# Patient Record
Sex: MALE | Race: WHITE | NOT HISPANIC OR LATINO | Employment: OTHER | URBAN - METROPOLITAN AREA
[De-identification: names, ages, dates, MRNs, and addresses within clinical notes are randomized per-mention and may not be internally consistent; named-entity substitution may affect disease eponyms.]

---

## 2017-12-04 ENCOUNTER — OFFICE VISIT (OUTPATIENT)
Dept: INTERNAL MEDICINE | Facility: CLINIC | Age: 62
End: 2017-12-04

## 2017-12-04 VITALS
SYSTOLIC BLOOD PRESSURE: 110 MMHG | HEART RATE: 105 BPM | DIASTOLIC BLOOD PRESSURE: 82 MMHG | TEMPERATURE: 97.6 F | BODY MASS INDEX: 26.14 KG/M2 | WEIGHT: 193 LBS | HEIGHT: 72 IN | OXYGEN SATURATION: 99 %

## 2017-12-04 DIAGNOSIS — N64.4 BREAST TENDERNESS IN MALE: Primary | ICD-10-CM

## 2017-12-04 PROCEDURE — 99203 OFFICE O/P NEW LOW 30 MIN: CPT | Performed by: FAMILY MEDICINE

## 2017-12-04 RX ORDER — TAVABOROLE 43.5 MG/ML
1 SOLUTION TOPICAL DAILY
COMMUNITY
Start: 2017-11-30 | End: 2019-04-16

## 2017-12-04 RX ORDER — SILDENAFIL CITRATE 100 MG
100 TABLET ORAL DAILY PRN
COMMUNITY
Start: 2017-11-04 | End: 2020-05-06

## 2017-12-04 RX ORDER — CLOMIPRAMINE HYDROCHLORIDE 50 MG/1
150 CAPSULE ORAL NIGHTLY
COMMUNITY
Start: 2017-11-15 | End: 2021-01-26 | Stop reason: SDUPTHER

## 2017-12-04 RX ORDER — OXICONAZOLE NITRATE 10 MG/ML
LOTION TOPICAL
COMMUNITY
Start: 2017-08-28 | End: 2017-12-04

## 2017-12-04 NOTE — PROGRESS NOTES
Subjective   Leonardo Youssef is a 62 y.o. male.     Chief Complaint   Patient presents with   • New Patient Visit   • left breast pain     for about 1 year         History of Present Illness     Patient presents today with left breast pain.  He notes that his been going there for one year.  He notes that it's painful.  He notes that he didn't follow up or get evaluated due to him being scared.  Patient has a significant family history of cancer including his father dying from liver cancer.  Mother had breast cancer.  He has a past medical history of prostate cancer.  He notes that it is tender around the nipple on palpation.  He notes that he can feel a small lump.  Patient denies any rash or abscess.  Patient denies any trauma.  Patient notes that nothing seems to make it better.    The following portions of the patient's history were reviewed and updated as appropriate: allergies, current medications, past family history, past medical history, past social history, past surgical history and problem list.    Review of Systems   Constitutional: Negative for chills and fever.   HENT: Negative for congestion, rhinorrhea, sinus pain and sore throat.    Eyes: Negative for photophobia and visual disturbance.   Respiratory: Negative for cough, chest tightness and shortness of breath.    Cardiovascular: Negative for chest pain and palpitations.   Gastrointestinal: Negative for diarrhea, nausea and vomiting.   Genitourinary: Negative for dysuria, frequency and urgency.   Skin: Negative for rash and wound.   Neurological: Negative for dizziness and syncope.   Psychiatric/Behavioral: Negative for behavioral problems and confusion.       Objective   Physical Exam   Constitutional: He is oriented to person, place, and time. He appears well-developed and well-nourished.   HENT:   Head: Normocephalic and atraumatic.   Right Ear: External ear normal.   Left Ear: External ear normal.   Mouth/Throat: Oropharynx is clear and moist.    Eyes: EOM are normal.   Neck: Normal range of motion. Neck supple.   Cardiovascular: Normal rate, regular rhythm and normal heart sounds.    Pulmonary/Chest: Effort normal and breath sounds normal. No respiratory distress. Right breast exhibits no inverted nipple. Left breast exhibits mass and tenderness. Left breast exhibits no inverted nipple.       Musculoskeletal: Normal range of motion.   Lymphadenopathy:     He has no cervical adenopathy.   Neurological: He is alert and oriented to person, place, and time.   Skin: Skin is warm.   Psychiatric: He has a normal mood and affect. His behavior is normal.   Nursing note and vitals reviewed.      Assessment/Plan   Leonardo was seen today for new patient visit and left breast pain.    Diagnoses and all orders for this visit:    Breast tenderness in male  -     US breast left complete; Future  -     We'll obtain ultrasound.  We'll evaluate results.  If positive will send patient to general surgeon for further evaluation.          No Follow-up on file.    Much of this encounter note is an electronic transcription/translation of spoken language to printed text. The electronic translation of spoken language may permit erroneous, or at times, nonsensical words or phrases to be inadvertently transcribed; although I have reviewed the note for such errors, some may still exist.

## 2017-12-05 DIAGNOSIS — N64.4 PAIN OF LEFT BREAST: Primary | ICD-10-CM

## 2017-12-05 DIAGNOSIS — N64.4 BREAST PAIN, LEFT: Primary | ICD-10-CM

## 2017-12-05 DIAGNOSIS — R51.9 TENDERNESS OF TEMPLE REGION: ICD-10-CM

## 2017-12-12 ENCOUNTER — HOSPITAL ENCOUNTER (OUTPATIENT)
Dept: ULTRASOUND IMAGING | Facility: HOSPITAL | Age: 62
Discharge: HOME | End: 2017-12-12
Admitting: FAMILY MEDICINE

## 2017-12-12 ENCOUNTER — HOSPITAL ENCOUNTER (OUTPATIENT)
Dept: MAMMOGRAPHY | Facility: HOSPITAL | Age: 62
Discharge: HOME | End: 2017-12-12

## 2017-12-12 DIAGNOSIS — N64.4 BREAST PAIN, LEFT: ICD-10-CM

## 2017-12-12 DIAGNOSIS — N64.4 BREAST TENDERNESS IN MALE: ICD-10-CM

## 2017-12-12 PROCEDURE — G0204 DX MAMMO INCL CAD BI: HCPCS

## 2017-12-12 PROCEDURE — 76642 ULTRASOUND BREAST LIMITED: CPT

## 2017-12-14 ENCOUNTER — TELEPHONE (OUTPATIENT)
Dept: INTERNAL MEDICINE | Facility: CLINIC | Age: 62
End: 2017-12-14

## 2017-12-14 NOTE — TELEPHONE ENCOUNTER
----- Message from Stan Thompson MD sent at 12/14/2017 11:04 AM EST -----  The labs were reviewed. Please inform patient that labs were normal.

## 2017-12-21 ENCOUNTER — OFFICE VISIT (OUTPATIENT)
Dept: INTERNAL MEDICINE | Facility: CLINIC | Age: 62
End: 2017-12-21

## 2017-12-21 VITALS
DIASTOLIC BLOOD PRESSURE: 84 MMHG | BODY MASS INDEX: 25.58 KG/M2 | HEART RATE: 91 BPM | WEIGHT: 188.9 LBS | OXYGEN SATURATION: 97 % | HEIGHT: 72 IN | SYSTOLIC BLOOD PRESSURE: 118 MMHG

## 2017-12-21 DIAGNOSIS — Z12.11 ENCOUNTER FOR SCREENING COLONOSCOPY: ICD-10-CM

## 2017-12-21 DIAGNOSIS — Z00.00 VISIT FOR WELL MAN HEALTH CHECK: Primary | ICD-10-CM

## 2017-12-21 DIAGNOSIS — Z13.220 SCREENING FOR LIPID DISORDERS: ICD-10-CM

## 2017-12-21 DIAGNOSIS — Z13.29 SCREENING FOR THYROID DISORDER: ICD-10-CM

## 2017-12-21 DIAGNOSIS — Z13.21 ENCOUNTER FOR VITAMIN DEFICIENCY SCREENING: ICD-10-CM

## 2017-12-21 DIAGNOSIS — Z11.59 ENCOUNTER FOR HEPATITIS C SCREENING TEST FOR LOW RISK PATIENT: ICD-10-CM

## 2017-12-21 DIAGNOSIS — Z13.1 SCREENING FOR DIABETES MELLITUS: ICD-10-CM

## 2017-12-21 PROCEDURE — 99396 PREV VISIT EST AGE 40-64: CPT | Performed by: FAMILY MEDICINE

## 2017-12-21 NOTE — PROGRESS NOTES
Subjective   Leonardo Youssef is a 62 y.o. male and is here for a comprehensive physical exam. The patient reports no problems.        Do you take any herbs or supplements that were not prescribed by a doctor? no      Social History:   Social History     Social History   • Marital status:      Spouse name: N/A   • Number of children: 2   • Years of education: N/A     Occupational History   • retired teacher      Social History Main Topics   • Smoking status: Never Smoker   • Smokeless tobacco: Never Used   • Alcohol use Yes      Comment: 1 per week   • Drug use: Not on file   • Sexual activity: Not on file     Other Topics Concern   • Not on file     Social History Narrative       Family History:   Family History   Problem Relation Age of Onset   • Glaucoma Mother    • Alzheimer's disease Mother    • Hypertension Father    • Diabetes Father    • Liver cancer Father    • Nephrolithiasis Father    • Multiple myeloma Sister    • Other Brother      liver transplant       Past Medical History:   Past Medical History:   Diagnosis Date   • Hx of radiation therapy    • Prostate cancer            Review of Systems    Review of Systems    Objective   Physical Exam    Medications:   Current Outpatient Prescriptions:   •  clomiPRAMINE (ANAFRANIL) 50 MG capsule, Take 100 mg by mouth Every Night., Disp: , Rfl:   •  KERYDIN 5 % solution, Apply 1 application topically Daily., Disp: , Rfl:   •  VIAGRA 100 MG tablet, Take 100 mg by mouth Daily As Needed., Disp: , Rfl:        Assessment/Plan   Healthy male exam.     1. Healthcare Maintenance:  2. Patient Counseling:  --Nutrition: Stressed importance of moderation in sodium/caffeine intake, saturated fat and cholesterol, caloric balance, sufficient intake of fresh fruits, vegetables, fiber, calcium and vit D  --Exercise: Recommended 30 minutes of exercise daily.  --Immunizations reviewed. Patient declined immunizations at today's office visit.  --Discussed benefits of screening  colonoscopy.  Referral placed.    Diagnoses and all orders for this visit:    Visit for well man health check  -     Comprehensive Metabolic Panel  -     CBC & Differential    Screening for lipid disorders  -     Lipid Panel With LDL / HDL Ratio    Screening for diabetes mellitus  -     Hemoglobin A1c    Screening for thyroid disorder  -     Thyroid Panel With TSH    Encounter for screening colonoscopy  -     Ambulatory Referral For Screening Colonoscopy    Encounter for vitamin deficiency screening  -     Vitamin D 25 Hydroxy    Encounter for hepatitis C screening test for low risk patient  -     Hepatitis C Antibody        No Follow-up on file.

## 2017-12-22 ENCOUNTER — TELEPHONE (OUTPATIENT)
Dept: INTERNAL MEDICINE | Facility: CLINIC | Age: 62
End: 2017-12-22

## 2017-12-22 LAB
25(OH)D3+25(OH)D2 SERPL-MCNC: 28 NG/ML (ref 30–100)
ALBUMIN SERPL-MCNC: 4.3 G/DL (ref 3.5–5.2)
ALBUMIN/GLOB SERPL: 1.5 G/DL
ALP SERPL-CCNC: 56 U/L (ref 39–117)
ALT SERPL-CCNC: 19 U/L (ref 1–41)
AST SERPL-CCNC: 20 U/L (ref 1–40)
BASOPHILS # BLD AUTO: 0.05 10*3/MM3 (ref 0–0.2)
BASOPHILS NFR BLD AUTO: 0.9 % (ref 0–1.5)
BILIRUB SERPL-MCNC: 0.5 MG/DL (ref 0.1–1.2)
BUN SERPL-MCNC: 21 MG/DL (ref 8–23)
BUN/CREAT SERPL: 17.8 (ref 7–25)
CALCIUM SERPL-MCNC: 9.7 MG/DL (ref 8.6–10.5)
CHLORIDE SERPL-SCNC: 100 MMOL/L (ref 98–107)
CHOLEST SERPL-MCNC: 194 MG/DL (ref 0–200)
CO2 SERPL-SCNC: 29.1 MMOL/L (ref 22–29)
CREAT SERPL-MCNC: 1.18 MG/DL (ref 0.76–1.27)
EOSINOPHIL # BLD AUTO: 0.21 10*3/MM3 (ref 0–0.7)
EOSINOPHIL NFR BLD AUTO: 3.9 % (ref 0.3–6.2)
ERYTHROCYTE [DISTWIDTH] IN BLOOD BY AUTOMATED COUNT: 14.2 % (ref 11.5–14.5)
FT4I SERPL CALC-MCNC: 1.8 (ref 1.2–4.9)
GLOBULIN SER CALC-MCNC: 2.9 GM/DL
GLUCOSE SERPL-MCNC: 103 MG/DL (ref 65–99)
HBA1C MFR BLD: 5.49 % (ref 4.8–5.6)
HCT VFR BLD AUTO: 48.2 % (ref 40.4–52.2)
HCV AB S/CO SERPL IA: <0.1 S/CO RATIO (ref 0–0.9)
HDLC SERPL-MCNC: 43 MG/DL (ref 40–60)
HGB BLD-MCNC: 16.3 G/DL (ref 13.7–17.6)
IMM GRANULOCYTES # BLD: 0 10*3/MM3 (ref 0–0.03)
IMM GRANULOCYTES NFR BLD: 0 % (ref 0–0.5)
LDLC SERPL CALC-MCNC: 117 MG/DL (ref 0–100)
LDLC/HDLC SERPL: 2.72 {RATIO}
LYMPHOCYTES # BLD AUTO: 1.32 10*3/MM3 (ref 0.9–4.8)
LYMPHOCYTES NFR BLD AUTO: 24.2 % (ref 19.6–45.3)
MCH RBC QN AUTO: 28.6 PG (ref 27–32.7)
MCHC RBC AUTO-ENTMCNC: 33.8 G/DL (ref 32.6–36.4)
MCV RBC AUTO: 84.7 FL (ref 79.8–96.2)
MONOCYTES # BLD AUTO: 0.49 10*3/MM3 (ref 0.2–1.2)
MONOCYTES NFR BLD AUTO: 9 % (ref 5–12)
NEUTROPHILS # BLD AUTO: 3.38 10*3/MM3 (ref 1.9–8.1)
NEUTROPHILS NFR BLD AUTO: 62 % (ref 42.7–76)
PLATELET # BLD AUTO: 263 10*3/MM3 (ref 140–500)
POTASSIUM SERPL-SCNC: 4.9 MMOL/L (ref 3.5–5.2)
PROT SERPL-MCNC: 7.2 G/DL (ref 6–8.5)
RBC # BLD AUTO: 5.69 10*6/MM3 (ref 4.6–6)
SODIUM SERPL-SCNC: 140 MMOL/L (ref 136–145)
T3RU NFR SERPL: 26 % (ref 24–39)
T4 SERPL-MCNC: 7.1 UG/DL (ref 4.5–12)
TRIGL SERPL-MCNC: 171 MG/DL (ref 0–150)
TSH SERPL DL<=0.005 MIU/L-ACNC: 3.36 UIU/ML (ref 0.45–4.5)
VLDLC SERPL CALC-MCNC: 34.2 MG/DL (ref 5–40)
WBC # BLD AUTO: 5.45 10*3/MM3 (ref 4.5–10.7)

## 2017-12-22 RX ORDER — ACETAMINOPHEN 160 MG
2000 TABLET,DISINTEGRATING ORAL DAILY
Qty: 90 CAPSULE | Refills: 3 | Status: SHIPPED | OUTPATIENT
Start: 2017-12-22 | End: 2018-12-22

## 2017-12-22 NOTE — TELEPHONE ENCOUNTER
----- Message from Stan Thompson MD sent at 12/22/2017 12:04 PM EST -----  Please inform the patient of the following abnormal results.  The patient needs to start 2000 international units of vitamin D.  Thyroid panel is within normal limits.  Normal hemoglobin A1c.  Hepatitis C antibodies negative.  CBC and CMP are within normal limits.  Lipid panel shows elevated triglycerides.  It also shows elevated LDL.  This can be controlled with diet and exercise.

## 2017-12-22 NOTE — PROGRESS NOTES
Please inform the patient of the following abnormal results.  The patient needs to start 2000 international units of vitamin D.  Thyroid panel is within normal limits.  Normal hemoglobin A1c.  Hepatitis C antibodies negative.  CBC and CMP are within normal limits.  Lipid panel shows elevated triglycerides.  It also shows elevated LDL.  This can be controlled with diet and exercise.

## 2019-04-16 ENCOUNTER — RESULTS ENCOUNTER (OUTPATIENT)
Dept: FAMILY MEDICINE CLINIC | Facility: CLINIC | Age: 64
End: 2019-04-16

## 2019-04-16 ENCOUNTER — OFFICE VISIT (OUTPATIENT)
Dept: FAMILY MEDICINE CLINIC | Facility: CLINIC | Age: 64
End: 2019-04-16

## 2019-04-16 VITALS
WEIGHT: 189.4 LBS | DIASTOLIC BLOOD PRESSURE: 84 MMHG | RESPIRATION RATE: 15 BRPM | OXYGEN SATURATION: 98 % | SYSTOLIC BLOOD PRESSURE: 142 MMHG | BODY MASS INDEX: 25.69 KG/M2 | TEMPERATURE: 97.5 F | HEART RATE: 80 BPM

## 2019-04-16 DIAGNOSIS — E55.9 VITAMIN D DEFICIENCY: ICD-10-CM

## 2019-04-16 DIAGNOSIS — Z79.899 MEDICATION MANAGEMENT: ICD-10-CM

## 2019-04-16 DIAGNOSIS — Z13.29 SCREENING FOR THYROID DISORDER: ICD-10-CM

## 2019-04-16 DIAGNOSIS — Z12.11 SCREENING FOR COLON CANCER: ICD-10-CM

## 2019-04-16 DIAGNOSIS — Z80.7 FAMILY HISTORY OF MULTIPLE MYELOMA: ICD-10-CM

## 2019-04-16 DIAGNOSIS — E78.5 HYPERLIPIDEMIA, UNSPECIFIED HYPERLIPIDEMIA TYPE: Primary | ICD-10-CM

## 2019-04-16 PROBLEM — F41.9 ANXIETY AND DEPRESSION: Status: ACTIVE | Noted: 2019-04-16

## 2019-04-16 PROBLEM — F42.9 OCD (OBSESSIVE COMPULSIVE DISORDER): Status: ACTIVE | Noted: 2019-04-16

## 2019-04-16 PROBLEM — F32.A ANXIETY AND DEPRESSION: Status: ACTIVE | Noted: 2019-04-16

## 2019-04-16 PROCEDURE — 99214 OFFICE O/P EST MOD 30 MIN: CPT | Performed by: NURSE PRACTITIONER

## 2019-04-16 NOTE — PROGRESS NOTES
Subjective   Leonardo Youssef is a 64 y.o. male presents to establish care. He is concerned for multiple myeloma, states he has a sister that was diagnosed 8 years ago. His brother was recently diagnosed. He is interested in lab tests to evaluate. Denies any chronic pain or injuries. Denies fatigue.     Hx of OCD, taking clomipramine and trintellix, managed by Psychiatry. States he was evaluated yesterday and reports starting trintellix today without side effects.     Prostate Ca, Dr. Reyes, six month check ups, tx with radiation, denies any urinary symptoms.     He has not had a screening colonoscopy. Agreeable to cologuard. Understands that if positive will need to proceed with colonoscopy.   He received his first pneumonia vaccine last year, around this time at [facility], states he will receive his second one soon. Has not received his shingles vaccine but will look into that at his local pharmacy.    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 He is  with two grown children. He has several grandchildren.       History of Present Illness     The following portions of the patient's history were reviewed and updated as appropriate: allergies, current medications, past family history, past medical history, past social history, past surgical history and problem list.    Review of Systems   Constitutional: Negative.    HENT: Negative.    Eyes: Negative.    Respiratory: Negative.    Cardiovascular: Negative.    Gastrointestinal: Negative.    Endocrine: Negative.    Genitourinary: Negative.    Musculoskeletal: Negative.    Skin: Negative.    Allergic/Immunologic: Negative.    Neurological: Negative.    Hematological: Negative.    Psychiatric/Behavioral: Negative.        Objective   Physical Exam   Constitutional: He is oriented to person, place, and time. He appears well-developed and well-nourished. No distress.   HENT:   Head: Normocephalic and atraumatic.   Right Ear:  Tympanic membrane, external ear and ear canal normal.   Left Ear: Tympanic membrane, external ear and ear canal normal.   Nose: Nose normal.   Mouth/Throat: Uvula is midline, oropharynx is clear and moist and mucous membranes are normal. No oropharyngeal exudate.   Neck: Neck supple.   Cardiovascular: Normal rate, regular rhythm and normal heart sounds. Exam reveals no gallop and no friction rub.   No murmur heard.  Pulmonary/Chest: Effort normal and breath sounds normal. No respiratory distress. He has no wheezes. He has no rales.   Abdominal: Soft. Bowel sounds are normal. He exhibits no distension. There is no tenderness.   Neurological: He is alert and oriented to person, place, and time.   Skin: Skin is warm and dry. He is not diaphoretic.   Psychiatric: He has a normal mood and affect.   Vitals reviewed.      Assessment/Plan   Leonardo was seen today for establish care.    Diagnoses and all orders for this visit:    Hyperlipidemia, unspecified hyperlipidemia type  -     Lipid Panel With LDL / HDL Ratio    Screening for colon cancer  -     Cologuard - Stool, Per Rectum; Future    Family history of multiple myeloma  -     CBC & Differential  -     C-reactive protein  -     Sedimentation rate, automated    Vitamin D deficiency  -     Vitamin D 25 hydroxy    Screening for thyroid disorder  -     TSH Rfx On Abnormal To Free T4    Medication management  -     Comprehensive metabolic panel    will proceed with lab testing, discussed for any abnormality, will refer to [facility] group, for further eval with family history of multiple myeloma, patient verbalized understanding  Labs nonfasting, suspect lipids to be increased, have baseline to compare  Follow up as needed or annually

## 2019-04-17 LAB
25(OH)D3+25(OH)D2 SERPL-MCNC: 32.9 NG/ML (ref 30–100)
ALBUMIN SERPL-MCNC: 4.5 G/DL (ref 3.5–5.2)
ALBUMIN/GLOB SERPL: 1.6 G/DL
ALP SERPL-CCNC: 63 U/L (ref 39–117)
ALT SERPL-CCNC: 19 U/L (ref 1–41)
AST SERPL-CCNC: 18 U/L (ref 1–40)
BASOPHILS # BLD AUTO: 0.07 10*3/MM3 (ref 0–0.2)
BASOPHILS NFR BLD AUTO: 1.5 % (ref 0–1.5)
BILIRUB SERPL-MCNC: 0.4 MG/DL (ref 0.2–1.2)
BUN SERPL-MCNC: 16 MG/DL (ref 8–23)
BUN/CREAT SERPL: 14.7 (ref 7–25)
CALCIUM SERPL-MCNC: 10 MG/DL (ref 8.6–10.5)
CHLORIDE SERPL-SCNC: 96 MMOL/L (ref 98–107)
CHOLEST SERPL-MCNC: 206 MG/DL (ref 0–200)
CO2 SERPL-SCNC: 30.1 MMOL/L (ref 22–29)
CREAT SERPL-MCNC: 1.09 MG/DL (ref 0.76–1.27)
CRP SERPL-MCNC: 0.31 MG/DL (ref 0–0.5)
EOSINOPHIL # BLD AUTO: 0.32 10*3/MM3 (ref 0–0.4)
EOSINOPHIL NFR BLD AUTO: 6.7 % (ref 0.3–6.2)
ERYTHROCYTE [DISTWIDTH] IN BLOOD BY AUTOMATED COUNT: 14.5 % (ref 12.3–15.4)
ERYTHROCYTE [SEDIMENTATION RATE] IN BLOOD BY WESTERGREN METHOD: 5 MM/HR (ref 0–20)
GLOBULIN SER CALC-MCNC: 2.9 GM/DL
GLUCOSE SERPL-MCNC: 87 MG/DL (ref 65–99)
HCT VFR BLD AUTO: 46.9 % (ref 37.5–51)
HDLC SERPL-MCNC: 42 MG/DL (ref 40–60)
HGB BLD-MCNC: 15.2 G/DL (ref 13–17.7)
IMM GRANULOCYTES # BLD AUTO: 0.01 10*3/MM3 (ref 0–0.05)
IMM GRANULOCYTES NFR BLD AUTO: 0.2 % (ref 0–0.5)
LDLC SERPL CALC-MCNC: 120 MG/DL (ref 0–100)
LDLC/HDLC SERPL: 2.86 {RATIO}
LYMPHOCYTES # BLD AUTO: 1.27 10*3/MM3 (ref 0.7–3.1)
LYMPHOCYTES NFR BLD AUTO: 26.6 % (ref 19.6–45.3)
MCH RBC QN AUTO: 27.1 PG (ref 26.6–33)
MCHC RBC AUTO-ENTMCNC: 32.4 G/DL (ref 31.5–35.7)
MCV RBC AUTO: 83.8 FL (ref 79–97)
MONOCYTES # BLD AUTO: 0.54 10*3/MM3 (ref 0.1–0.9)
MONOCYTES NFR BLD AUTO: 11.3 % (ref 5–12)
NEUTROPHILS # BLD AUTO: 2.56 10*3/MM3 (ref 1.4–7)
NEUTROPHILS NFR BLD AUTO: 53.7 % (ref 42.7–76)
NRBC BLD AUTO-RTO: 0.2 /100 WBC (ref 0–0)
PLATELET # BLD AUTO: 277 10*3/MM3 (ref 140–450)
POTASSIUM SERPL-SCNC: 4.6 MMOL/L (ref 3.5–5.2)
PROT SERPL-MCNC: 7.4 G/DL (ref 6–8.5)
RBC # BLD AUTO: 5.6 10*6/MM3 (ref 4.14–5.8)
SODIUM SERPL-SCNC: 135 MMOL/L (ref 136–145)
TRIGL SERPL-MCNC: 220 MG/DL (ref 0–150)
TSH SERPL DL<=0.005 MIU/L-ACNC: 3.83 MIU/ML (ref 0.27–4.2)
VLDLC SERPL CALC-MCNC: 44 MG/DL
WBC # BLD AUTO: 4.77 10*3/MM3 (ref 3.4–10.8)

## 2020-04-16 ENCOUNTER — APPOINTMENT (OUTPATIENT)
Dept: GENERAL RADIOLOGY | Facility: HOSPITAL | Age: 65
End: 2020-04-16

## 2020-04-16 ENCOUNTER — APPOINTMENT (OUTPATIENT)
Dept: CT IMAGING | Facility: HOSPITAL | Age: 65
End: 2020-04-16

## 2020-04-16 ENCOUNTER — ANESTHESIA EVENT (OUTPATIENT)
Dept: GASTROENTEROLOGY | Facility: HOSPITAL | Age: 65
End: 2020-04-16

## 2020-04-16 ENCOUNTER — ANESTHESIA (OUTPATIENT)
Dept: GASTROENTEROLOGY | Facility: HOSPITAL | Age: 65
End: 2020-04-16

## 2020-04-16 ENCOUNTER — HOSPITAL ENCOUNTER (INPATIENT)
Facility: HOSPITAL | Age: 65
LOS: 14 days | Discharge: HOME | End: 2020-04-30
Attending: EMERGENCY MEDICINE | Admitting: HOSPITALIST

## 2020-04-16 DIAGNOSIS — R93.5 ABNORMAL CT OF THE ABDOMEN: ICD-10-CM

## 2020-04-16 DIAGNOSIS — R10.13 EPIGASTRIC PAIN: Primary | ICD-10-CM

## 2020-04-16 DIAGNOSIS — K20.90 ESOPHAGITIS: ICD-10-CM

## 2020-04-16 DIAGNOSIS — I63.9 CEREBELLAR INFARCT (HCC): ICD-10-CM

## 2020-04-16 PROBLEM — J96.01 ACUTE RESPIRATORY FAILURE WITH HYPOXIA (HCC): Status: ACTIVE | Noted: 2020-04-16

## 2020-04-16 PROBLEM — N18.9 ACUTE KIDNEY INJURY SUPERIMPOSED ON CHRONIC KIDNEY DISEASE (HCC): Status: ACTIVE | Noted: 2020-04-16

## 2020-04-16 PROBLEM — R50.9 FEVER: Status: ACTIVE | Noted: 2020-04-16

## 2020-04-16 PROBLEM — N17.9 ACUTE KIDNEY INJURY SUPERIMPOSED ON CHRONIC KIDNEY DISEASE: Status: ACTIVE | Noted: 2020-04-16

## 2020-04-16 PROBLEM — J69.0 ASPIRATION PNEUMONIA: Status: ACTIVE | Noted: 2020-04-16

## 2020-04-16 PROBLEM — R13.10 DYSPHAGIA: Status: ACTIVE | Noted: 2020-04-16

## 2020-04-16 PROBLEM — D72.829 LEUKOCYTOSIS: Status: ACTIVE | Noted: 2020-04-16

## 2020-04-16 PROBLEM — I95.9 HYPOTENSION: Status: ACTIVE | Noted: 2020-04-16

## 2020-04-16 LAB
ALBUMIN SERPL-MCNC: 4.2 G/DL (ref 3.5–5.2)
ALBUMIN/GLOB SERPL: 1.2 G/DL
ALP SERPL-CCNC: 60 U/L (ref 39–117)
ALT SERPL W P-5'-P-CCNC: 21 U/L (ref 1–41)
ANION GAP SERPL CALCULATED.3IONS-SCNC: 12.3 MMOL/L (ref 5–15)
ANION GAP SERPL CALCULATED.3IONS-SCNC: 13.3 MMOL/L (ref 5–15)
AST SERPL-CCNC: 25 U/L (ref 1–40)
B PARAPERT DNA SPEC QL NAA+PROBE: NOT DETECTED
B PERT DNA SPEC QL NAA+PROBE: NOT DETECTED
BACTERIA UR QL AUTO: ABNORMAL /HPF
BASOPHILS # BLD AUTO: 0.04 10*3/MM3 (ref 0–0.2)
BASOPHILS NFR BLD AUTO: 0.3 % (ref 0–1.5)
BILIRUB SERPL-MCNC: 0.6 MG/DL (ref 0.2–1.2)
BILIRUB UR QL STRIP: NEGATIVE
BUN BLD-MCNC: 20 MG/DL (ref 8–23)
BUN BLD-MCNC: 23 MG/DL (ref 8–23)
BUN/CREAT SERPL: 17.2 (ref 7–25)
BUN/CREAT SERPL: 18.7 (ref 7–25)
C PNEUM DNA NPH QL NAA+NON-PROBE: NOT DETECTED
CALCIUM SPEC-SCNC: 9.2 MG/DL (ref 8.6–10.5)
CALCIUM SPEC-SCNC: 9.8 MG/DL (ref 8.6–10.5)
CHLORIDE SERPL-SCNC: 95 MMOL/L (ref 98–107)
CHLORIDE SERPL-SCNC: 97 MMOL/L (ref 98–107)
CLARITY UR: CLEAR
CO2 SERPL-SCNC: 24.7 MMOL/L (ref 22–29)
CO2 SERPL-SCNC: 27.7 MMOL/L (ref 22–29)
COLOR UR: YELLOW
CREAT BLD-MCNC: 1.16 MG/DL (ref 0.76–1.27)
CREAT BLD-MCNC: 1.23 MG/DL (ref 0.76–1.27)
D-LACTATE SERPL-SCNC: 2.3 MMOL/L (ref 0.5–2)
D-LACTATE SERPL-SCNC: 2.4 MMOL/L (ref 0.5–2)
DEPRECATED RDW RBC AUTO: 40.4 FL (ref 37–54)
DEPRECATED RDW RBC AUTO: 42.3 FL (ref 37–54)
EOSINOPHIL # BLD AUTO: 0.01 10*3/MM3 (ref 0–0.4)
EOSINOPHIL NFR BLD AUTO: 0.1 % (ref 0.3–6.2)
ERYTHROCYTE [DISTWIDTH] IN BLOOD BY AUTOMATED COUNT: 14.1 % (ref 12.3–15.4)
ERYTHROCYTE [DISTWIDTH] IN BLOOD BY AUTOMATED COUNT: 14.1 % (ref 12.3–15.4)
FLUAV H1 2009 PAND RNA NPH QL NAA+PROBE: NOT DETECTED
FLUAV H1 HA GENE NPH QL NAA+PROBE: NOT DETECTED
FLUAV H3 RNA NPH QL NAA+PROBE: NOT DETECTED
FLUAV SUBTYP SPEC NAA+PROBE: NOT DETECTED
FLUBV RNA ISLT QL NAA+PROBE: NOT DETECTED
GFR SERPL CREATININE-BSD FRML MDRD: 59 ML/MIN/1.73
GFR SERPL CREATININE-BSD FRML MDRD: 63 ML/MIN/1.73
GLOBULIN UR ELPH-MCNC: 3.5 GM/DL
GLUCOSE BLD-MCNC: 119 MG/DL (ref 65–99)
GLUCOSE BLD-MCNC: 139 MG/DL (ref 65–99)
GLUCOSE BLDC GLUCOMTR-MCNC: 73 MG/DL (ref 70–130)
GLUCOSE BLDC GLUCOMTR-MCNC: 78 MG/DL (ref 70–130)
GLUCOSE BLDC GLUCOMTR-MCNC: 84 MG/DL (ref 70–130)
GLUCOSE UR STRIP-MCNC: NEGATIVE MG/DL
HADV DNA SPEC NAA+PROBE: NOT DETECTED
HCOV 229E RNA SPEC QL NAA+PROBE: NOT DETECTED
HCOV HKU1 RNA SPEC QL NAA+PROBE: NOT DETECTED
HCOV NL63 RNA SPEC QL NAA+PROBE: NOT DETECTED
HCOV OC43 RNA SPEC QL NAA+PROBE: NOT DETECTED
HCT VFR BLD AUTO: 42.2 % (ref 37.5–51)
HCT VFR BLD AUTO: 47.2 % (ref 37.5–51)
HGB BLD-MCNC: 14.5 G/DL (ref 13–17.7)
HGB BLD-MCNC: 15.8 G/DL (ref 13–17.7)
HGB UR QL STRIP.AUTO: NEGATIVE
HMPV RNA NPH QL NAA+NON-PROBE: NOT DETECTED
HPIV1 RNA SPEC QL NAA+PROBE: NOT DETECTED
HPIV2 RNA SPEC QL NAA+PROBE: NOT DETECTED
HPIV3 RNA NPH QL NAA+PROBE: NOT DETECTED
HPIV4 P GENE NPH QL NAA+PROBE: NOT DETECTED
HYALINE CASTS UR QL AUTO: ABNORMAL /LPF
IMM GRANULOCYTES # BLD AUTO: 0.05 10*3/MM3 (ref 0–0.05)
IMM GRANULOCYTES NFR BLD AUTO: 0.3 % (ref 0–0.5)
KETONES UR QL STRIP: NEGATIVE
LACTATE HOLD SPECIMEN: NORMAL
LDH SERPL-CCNC: 135 U/L (ref 135–225)
LEUKOCYTE ESTERASE UR QL STRIP.AUTO: ABNORMAL
LIPASE SERPL-CCNC: 24 U/L (ref 13–60)
LYMPHOCYTES # BLD AUTO: 0.35 10*3/MM3 (ref 0.7–3.1)
LYMPHOCYTES NFR BLD AUTO: 2.4 % (ref 19.6–45.3)
M PNEUMO IGG SER IA-ACNC: NOT DETECTED
MCH RBC QN AUTO: 27.9 PG (ref 26.6–33)
MCH RBC QN AUTO: 27.9 PG (ref 26.6–33)
MCHC RBC AUTO-ENTMCNC: 33.5 G/DL (ref 31.5–35.7)
MCHC RBC AUTO-ENTMCNC: 34.4 G/DL (ref 31.5–35.7)
MCV RBC AUTO: 81.2 FL (ref 79–97)
MCV RBC AUTO: 83.4 FL (ref 79–97)
MONOCYTES # BLD AUTO: 0.25 10*3/MM3 (ref 0.1–0.9)
MONOCYTES NFR BLD AUTO: 1.7 % (ref 5–12)
NEUTROPHILS # BLD AUTO: 14.13 10*3/MM3 (ref 1.7–7)
NEUTROPHILS NFR BLD AUTO: 95.2 % (ref 42.7–76)
NITRITE UR QL STRIP: NEGATIVE
NRBC BLD AUTO-RTO: 0 /100 WBC (ref 0–0.2)
PH UR STRIP.AUTO: 6.5 [PH] (ref 5–8)
PLATELET # BLD AUTO: 223 10*3/MM3 (ref 140–450)
PLATELET # BLD AUTO: 257 10*3/MM3 (ref 140–450)
PMV BLD AUTO: 9.2 FL (ref 6–12)
PMV BLD AUTO: 9.6 FL (ref 6–12)
POTASSIUM BLD-SCNC: 4.1 MMOL/L (ref 3.5–5.2)
POTASSIUM BLD-SCNC: 4.8 MMOL/L (ref 3.5–5.2)
PROCALCITONIN SERPL-MCNC: 2.95 NG/ML (ref 0.1–0.25)
PROT SERPL-MCNC: 7.7 G/DL (ref 6–8.5)
PROT UR QL STRIP: NEGATIVE
RBC # BLD AUTO: 5.2 10*6/MM3 (ref 4.14–5.8)
RBC # BLD AUTO: 5.66 10*6/MM3 (ref 4.14–5.8)
RBC # UR: ABNORMAL /HPF
REF LAB TEST METHOD: ABNORMAL
RETICS # AUTO: 0.07 10*6/MM3 (ref 0.02–0.13)
RETICS/RBC NFR AUTO: 1.26 % (ref 0.7–1.9)
RHINOVIRUS RNA SPEC NAA+PROBE: NOT DETECTED
RSV RNA NPH QL NAA+NON-PROBE: NOT DETECTED
SARS-COV-2 RNA RESP QL NAA+PROBE: NOT DETECTED
SODIUM BLD-SCNC: 134 MMOL/L (ref 136–145)
SODIUM BLD-SCNC: 136 MMOL/L (ref 136–145)
SP GR UR STRIP: 1.01 (ref 1–1.03)
SQUAMOUS #/AREA URNS HPF: ABNORMAL /HPF
UROBILINOGEN UR QL STRIP: ABNORMAL
WBC NRBC COR # BLD: 14.83 10*3/MM3 (ref 3.4–10.8)
WBC NRBC COR # BLD: 15.7 10*3/MM3 (ref 3.4–10.8)
WBC UR QL AUTO: ABNORMAL /HPF

## 2020-04-16 PROCEDURE — 25010000002 GLUCAGON (HUMAN RECOMBINANT) 1 MG RECONSTITUTED SOLUTION: Performed by: EMERGENCY MEDICINE

## 2020-04-16 PROCEDURE — 88312 SPECIAL STAINS GROUP 1: CPT | Performed by: INTERNAL MEDICINE

## 2020-04-16 PROCEDURE — 99222 1ST HOSP IP/OBS MODERATE 55: CPT | Performed by: INTERNAL MEDICINE

## 2020-04-16 PROCEDURE — 80048 BASIC METABOLIC PNL TOTAL CA: CPT | Performed by: NURSE PRACTITIONER

## 2020-04-16 PROCEDURE — 25010000002 LORAZEPAM PER 2 MG: Performed by: EMERGENCY MEDICINE

## 2020-04-16 PROCEDURE — 25010000002 ONDANSETRON PER 1 MG: Performed by: EMERGENCY MEDICINE

## 2020-04-16 PROCEDURE — 25010000002 PROPOFOL 10 MG/ML EMULSION: Performed by: ANESTHESIOLOGY

## 2020-04-16 PROCEDURE — 82962 GLUCOSE BLOOD TEST: CPT

## 2020-04-16 PROCEDURE — 74177 CT ABD & PELVIS W/CONTRAST: CPT

## 2020-04-16 PROCEDURE — 80053 COMPREHEN METABOLIC PANEL: CPT | Performed by: EMERGENCY MEDICINE

## 2020-04-16 PROCEDURE — 71045 X-RAY EXAM CHEST 1 VIEW: CPT

## 2020-04-16 PROCEDURE — 25010000002 PHENYLEPHRINE PER 1 ML: Performed by: ANESTHESIOLOGY

## 2020-04-16 PROCEDURE — 88305 TISSUE EXAM BY PATHOLOGIST: CPT | Performed by: INTERNAL MEDICINE

## 2020-04-16 PROCEDURE — 81001 URINALYSIS AUTO W/SCOPE: CPT | Performed by: EMERGENCY MEDICINE

## 2020-04-16 PROCEDURE — 85045 AUTOMATED RETICULOCYTE COUNT: CPT | Performed by: NURSE PRACTITIONER

## 2020-04-16 PROCEDURE — 25010000002 HYDROMORPHONE PER 4 MG: Performed by: HOSPITALIST

## 2020-04-16 PROCEDURE — C1894 INTRO/SHEATH, NON-LASER: HCPCS

## 2020-04-16 PROCEDURE — 99223 1ST HOSP IP/OBS HIGH 75: CPT | Performed by: THORACIC SURGERY (CARDIOTHORACIC VASCULAR SURGERY)

## 2020-04-16 PROCEDURE — 99285 EMERGENCY DEPT VISIT HI MDM: CPT

## 2020-04-16 PROCEDURE — 0100U HC BIOFIRE FILMARRAY RESP PANEL 2: CPT | Performed by: NURSE PRACTITIONER

## 2020-04-16 PROCEDURE — 25010000002 PIPERACILLIN SOD-TAZOBACTAM PER 1 G: Performed by: NURSE PRACTITIONER

## 2020-04-16 PROCEDURE — 87040 BLOOD CULTURE FOR BACTERIA: CPT | Performed by: NURSE PRACTITIONER

## 2020-04-16 PROCEDURE — 83690 ASSAY OF LIPASE: CPT | Performed by: EMERGENCY MEDICINE

## 2020-04-16 PROCEDURE — 84145 PROCALCITONIN (PCT): CPT | Performed by: NURSE PRACTITIONER

## 2020-04-16 PROCEDURE — 87635 SARS-COV-2 COVID-19 AMP PRB: CPT | Performed by: EMERGENCY MEDICINE

## 2020-04-16 PROCEDURE — 85025 COMPLETE CBC W/AUTO DIFF WBC: CPT | Performed by: EMERGENCY MEDICINE

## 2020-04-16 PROCEDURE — 36415 COLL VENOUS BLD VENIPUNCTURE: CPT | Performed by: NURSE PRACTITIONER

## 2020-04-16 PROCEDURE — 25010000002 ONDANSETRON PER 1 MG: Performed by: NURSE PRACTITIONER

## 2020-04-16 PROCEDURE — 25010000002 HYDROMORPHONE PER 4 MG: Performed by: EMERGENCY MEDICINE

## 2020-04-16 PROCEDURE — 25010000002 HYDROMORPHONE PER 4 MG: Performed by: INTERNAL MEDICINE

## 2020-04-16 PROCEDURE — 71250 CT THORAX DX C-: CPT

## 2020-04-16 PROCEDURE — 85027 COMPLETE CBC AUTOMATED: CPT | Performed by: NURSE PRACTITIONER

## 2020-04-16 PROCEDURE — 83615 LACTATE (LD) (LDH) ENZYME: CPT | Performed by: NURSE PRACTITIONER

## 2020-04-16 PROCEDURE — 83605 ASSAY OF LACTIC ACID: CPT | Performed by: NURSE PRACTITIONER

## 2020-04-16 PROCEDURE — 43239 EGD BIOPSY SINGLE/MULTIPLE: CPT | Performed by: INTERNAL MEDICINE

## 2020-04-16 PROCEDURE — 0DB58ZX EXCISION OF ESOPHAGUS, VIA NATURAL OR ARTIFICIAL OPENING ENDOSCOPIC, DIAGNOSTIC: ICD-10-PCS | Performed by: INTERNAL MEDICINE

## 2020-04-16 PROCEDURE — 87040 BLOOD CULTURE FOR BACTERIA: CPT | Performed by: INTERNAL MEDICINE

## 2020-04-16 PROCEDURE — 25010000002 PIPERACILLIN SOD-TAZOBACTAM PER 1 G: Performed by: INTERNAL MEDICINE

## 2020-04-16 PROCEDURE — 25010000002 IOPAMIDOL 61 % SOLUTION: Performed by: EMERGENCY MEDICINE

## 2020-04-16 RX ORDER — PANTOPRAZOLE SODIUM 40 MG/10ML
40 INJECTION, POWDER, LYOPHILIZED, FOR SOLUTION INTRAVENOUS
Status: DISCONTINUED | OUTPATIENT
Start: 2020-04-16 | End: 2020-04-21

## 2020-04-16 RX ORDER — NICOTINE POLACRILEX 4 MG
15 LOZENGE BUCCAL
Status: DISCONTINUED | OUTPATIENT
Start: 2020-04-16 | End: 2020-04-30 | Stop reason: HOSPADM

## 2020-04-16 RX ORDER — SODIUM CHLORIDE 9 MG/ML
150 INJECTION, SOLUTION INTRAVENOUS CONTINUOUS
Status: DISCONTINUED | OUTPATIENT
Start: 2020-04-16 | End: 2020-04-17

## 2020-04-16 RX ORDER — ONDANSETRON 2 MG/ML
4 INJECTION INTRAMUSCULAR; INTRAVENOUS ONCE
Status: COMPLETED | OUTPATIENT
Start: 2020-04-16 | End: 2020-04-16

## 2020-04-16 RX ORDER — LIDOCAINE HYDROCHLORIDE 20 MG/ML
INJECTION, SOLUTION INFILTRATION; PERINEURAL AS NEEDED
Status: DISCONTINUED | OUTPATIENT
Start: 2020-04-16 | End: 2020-04-16 | Stop reason: SURG

## 2020-04-16 RX ORDER — SUCRALFATE ORAL 1 G/10ML
1 SUSPENSION ORAL
Status: DISCONTINUED | OUTPATIENT
Start: 2020-04-16 | End: 2020-04-20

## 2020-04-16 RX ORDER — FAMOTIDINE 10 MG/ML
20 INJECTION, SOLUTION INTRAVENOUS DAILY
Status: DISCONTINUED | OUTPATIENT
Start: 2020-04-16 | End: 2020-04-16

## 2020-04-16 RX ORDER — ONDANSETRON 2 MG/ML
4 INJECTION INTRAMUSCULAR; INTRAVENOUS EVERY 6 HOURS PRN
Status: DISCONTINUED | OUTPATIENT
Start: 2020-04-16 | End: 2020-04-30 | Stop reason: HOSPADM

## 2020-04-16 RX ORDER — SODIUM CHLORIDE 0.9 % (FLUSH) 0.9 %
10 SYRINGE (ML) INJECTION AS NEEDED
Status: DISCONTINUED | OUTPATIENT
Start: 2020-04-16 | End: 2020-04-30 | Stop reason: HOSPADM

## 2020-04-16 RX ORDER — SODIUM CHLORIDE 0.9 % (FLUSH) 0.9 %
20 SYRINGE (ML) INJECTION AS NEEDED
Status: DISCONTINUED | OUTPATIENT
Start: 2020-04-16 | End: 2020-04-30 | Stop reason: HOSPADM

## 2020-04-16 RX ORDER — CLOMIPRAMINE HYDROCHLORIDE 50 MG/1
100 CAPSULE ORAL NIGHTLY
Status: DISCONTINUED | OUTPATIENT
Start: 2020-04-16 | End: 2020-04-18

## 2020-04-16 RX ORDER — SODIUM CHLORIDE 0.9 % (FLUSH) 0.9 %
10 SYRINGE (ML) INJECTION EVERY 12 HOURS SCHEDULED
Status: DISCONTINUED | OUTPATIENT
Start: 2020-04-16 | End: 2020-04-18

## 2020-04-16 RX ORDER — PROPOFOL 10 MG/ML
VIAL (ML) INTRAVENOUS AS NEEDED
Status: DISCONTINUED | OUTPATIENT
Start: 2020-04-16 | End: 2020-04-16 | Stop reason: SURG

## 2020-04-16 RX ORDER — BISACODYL 10 MG
10 SUPPOSITORY, RECTAL RECTAL DAILY PRN
Status: DISCONTINUED | OUTPATIENT
Start: 2020-04-16 | End: 2020-04-30 | Stop reason: HOSPADM

## 2020-04-16 RX ORDER — DEXTROSE MONOHYDRATE 25 G/50ML
25 INJECTION, SOLUTION INTRAVENOUS
Status: DISCONTINUED | OUTPATIENT
Start: 2020-04-16 | End: 2020-04-30 | Stop reason: HOSPADM

## 2020-04-16 RX ORDER — HYDROMORPHONE HYDROCHLORIDE 1 MG/ML
0.5 INJECTION, SOLUTION INTRAMUSCULAR; INTRAVENOUS; SUBCUTANEOUS
Status: DISCONTINUED | OUTPATIENT
Start: 2020-04-16 | End: 2020-04-18

## 2020-04-16 RX ORDER — ONDANSETRON 4 MG/1
4 TABLET, FILM COATED ORAL EVERY 6 HOURS PRN
Status: DISCONTINUED | OUTPATIENT
Start: 2020-04-16 | End: 2020-04-30 | Stop reason: HOSPADM

## 2020-04-16 RX ORDER — LORAZEPAM 2 MG/ML
1 INJECTION INTRAMUSCULAR ONCE
Status: COMPLETED | OUTPATIENT
Start: 2020-04-16 | End: 2020-04-16

## 2020-04-16 RX ORDER — SODIUM CHLORIDE, SODIUM LACTATE, POTASSIUM CHLORIDE, CALCIUM CHLORIDE 600; 310; 30; 20 MG/100ML; MG/100ML; MG/100ML; MG/100ML
30 INJECTION, SOLUTION INTRAVENOUS CONTINUOUS
Status: CANCELLED | OUTPATIENT
Start: 2020-04-16

## 2020-04-16 RX ORDER — SODIUM CHLORIDE 9 MG/ML
30 INJECTION, SOLUTION INTRAVENOUS CONTINUOUS PRN
Status: DISCONTINUED | OUTPATIENT
Start: 2020-04-16 | End: 2020-04-16

## 2020-04-16 RX ORDER — HYDROMORPHONE HYDROCHLORIDE 1 MG/ML
0.5 INJECTION, SOLUTION INTRAMUSCULAR; INTRAVENOUS; SUBCUTANEOUS ONCE
Status: COMPLETED | OUTPATIENT
Start: 2020-04-16 | End: 2020-04-16

## 2020-04-16 RX ORDER — DEXTROSE AND SODIUM CHLORIDE 5; .9 G/100ML; G/100ML
150 INJECTION, SOLUTION INTRAVENOUS CONTINUOUS
Status: DISCONTINUED | OUTPATIENT
Start: 2020-04-16 | End: 2020-04-19

## 2020-04-16 RX ORDER — DEXTROSE AND SODIUM CHLORIDE 5; .9 G/100ML; G/100ML
150 INJECTION, SOLUTION INTRAVENOUS CONTINUOUS
Status: DISCONTINUED | OUTPATIENT
Start: 2020-04-16 | End: 2020-04-16

## 2020-04-16 RX ORDER — FAMOTIDINE 10 MG/ML
20 INJECTION, SOLUTION INTRAVENOUS ONCE
Status: COMPLETED | OUTPATIENT
Start: 2020-04-16 | End: 2020-04-16

## 2020-04-16 RX ADMIN — HYDROMORPHONE HYDROCHLORIDE 0.5 MG: 1 INJECTION, SOLUTION INTRAMUSCULAR; INTRAVENOUS; SUBCUTANEOUS at 08:51

## 2020-04-16 RX ADMIN — HYDROMORPHONE HYDROCHLORIDE 0.5 MG: 1 INJECTION, SOLUTION INTRAMUSCULAR; INTRAVENOUS; SUBCUTANEOUS at 04:31

## 2020-04-16 RX ADMIN — FAMOTIDINE 20 MG: 10 INJECTION INTRAVENOUS at 01:50

## 2020-04-16 RX ADMIN — GLUCAGON HYDROCHLORIDE 1 MG: 1 INJECTION, POWDER, FOR SOLUTION INTRAMUSCULAR; INTRAVENOUS; SUBCUTANEOUS at 01:52

## 2020-04-16 RX ADMIN — TAZOBACTAM SODIUM AND PIPERACILLIN SODIUM 3.38 G: 375; 3 INJECTION, SOLUTION INTRAVENOUS at 13:21

## 2020-04-16 RX ADMIN — LIDOCAINE HYDROCHLORIDE 60 MG: 20 INJECTION, SOLUTION INFILTRATION; PERINEURAL at 10:33

## 2020-04-16 RX ADMIN — DEXTROSE AND SODIUM CHLORIDE 150 ML/HR: 5; 900 INJECTION, SOLUTION INTRAVENOUS at 22:28

## 2020-04-16 RX ADMIN — PHENYLEPHRINE HYDROCHLORIDE 200 MCG: 10 INJECTION INTRAVENOUS at 10:44

## 2020-04-16 RX ADMIN — PROPOFOL 140 MG: 10 INJECTION, EMULSION INTRAVENOUS at 10:33

## 2020-04-16 RX ADMIN — LORAZEPAM 1 MG: 2 INJECTION INTRAMUSCULAR; INTRAVENOUS at 01:54

## 2020-04-16 RX ADMIN — PHENYLEPHRINE HYDROCHLORIDE 200 MCG: 10 INJECTION INTRAVENOUS at 10:49

## 2020-04-16 RX ADMIN — ONDANSETRON 4 MG: 2 INJECTION INTRAMUSCULAR; INTRAVENOUS at 08:51

## 2020-04-16 RX ADMIN — SUCRALFATE 1 G: 1 SUSPENSION ORAL at 20:01

## 2020-04-16 RX ADMIN — PANTOPRAZOLE SODIUM 40 MG: 40 INJECTION, POWDER, FOR SOLUTION INTRAVENOUS at 17:49

## 2020-04-16 RX ADMIN — DEXTROSE AND SODIUM CHLORIDE 150 ML/HR: 5; 900 INJECTION, SOLUTION INTRAVENOUS at 17:41

## 2020-04-16 RX ADMIN — SUCRALFATE 1 G: 1 SUSPENSION ORAL at 17:49

## 2020-04-16 RX ADMIN — HYDROMORPHONE HYDROCHLORIDE 0.5 MG: 1 INJECTION, SOLUTION INTRAMUSCULAR; INTRAVENOUS; SUBCUTANEOUS at 16:35

## 2020-04-16 RX ADMIN — SODIUM CHLORIDE, PRESERVATIVE FREE 10 ML: 5 INJECTION INTRAVENOUS at 08:52

## 2020-04-16 RX ADMIN — PHENYLEPHRINE HYDROCHLORIDE 200 MCG: 10 INJECTION INTRAVENOUS at 10:47

## 2020-04-16 RX ADMIN — SODIUM CHLORIDE 100 ML/HR: 9 INJECTION, SOLUTION INTRAVENOUS at 04:36

## 2020-04-16 RX ADMIN — SODIUM CHLORIDE 30 ML/HR: 9 INJECTION, SOLUTION INTRAVENOUS at 09:47

## 2020-04-16 RX ADMIN — PROPOFOL 250 MCG/KG/MIN: 10 INJECTION, EMULSION INTRAVENOUS at 10:33

## 2020-04-16 RX ADMIN — TAZOBACTAM SODIUM AND PIPERACILLIN SODIUM 3.38 G: 375; 3 INJECTION, SOLUTION INTRAVENOUS at 20:01

## 2020-04-16 RX ADMIN — HYDROMORPHONE HYDROCHLORIDE 0.5 MG: 1 INJECTION, SOLUTION INTRAMUSCULAR; INTRAVENOUS; SUBCUTANEOUS at 23:56

## 2020-04-16 RX ADMIN — IOPAMIDOL 94 ML: 612 INJECTION, SOLUTION INTRAVENOUS at 02:58

## 2020-04-16 RX ADMIN — HYDROMORPHONE HYDROCHLORIDE 0.5 MG: 1 INJECTION, SOLUTION INTRAMUSCULAR; INTRAVENOUS; SUBCUTANEOUS at 20:07

## 2020-04-16 RX ADMIN — PHENYLEPHRINE HYDROCHLORIDE 200 MCG: 10 INJECTION INTRAVENOUS at 10:37

## 2020-04-16 RX ADMIN — ONDANSETRON 4 MG: 2 INJECTION INTRAMUSCULAR; INTRAVENOUS at 04:30

## 2020-04-16 NOTE — ANESTHESIA PREPROCEDURE EVALUATION
Anesthesia Evaluation     no history of anesthetic complications:               Airway   Mallampati: II  Neck ROM: full  no difficulty expected  Dental - normal exam     Pulmonary - normal exam   (+) pneumonia ,   (-) COPD, asthma, sleep apnea, not a smoker    ROS comment: Respiratory failure with hypoxia  PE comment: nonlabored  Cardiovascular - negative cardio ROS and normal exam    Rhythm: regular  Rate: normal    (-) hypertension, valvular problems/murmurs, past MI, CAD, dysrhythmias, angina      Neuro/Psych  (+) psychiatric history (OCD) Depression,     (-) seizures, TIA, CVA  GI/Hepatic/Renal/Endo    (+)   renal disease stones, ARF and CRI,   (-) GERD, liver disease, diabetes, no thyroid disorder    Musculoskeletal (-) negative ROS    Abdominal    Substance History      OB/GYN          Other   blood dyscrasia (leukocytosis),   history of cancer (prostate cancer)                    Anesthesia Plan    ASA 3     MAC       Anesthetic plan, all risks, benefits, and alternatives have been provided, discussed and informed consent has been obtained with: patient.

## 2020-04-16 NOTE — ANESTHESIA POSTPROCEDURE EVALUATION
"Patient: Leonardo Youssef    Procedure Summary     Date:  04/16/20 Room / Location:   [facility]    Anesthesia Start:  1029 Anesthesia Stop:  1056    Procedure:  ESOPHAGOGASTRODUODENOSCOPY WITH COLD BIOPSIES (N/A Esophagus) Diagnosis:       Epigastric pain      Abnormal CT of the abdomen      (Epigastric pain [R10.13])      (Abnormal CT of the abdomen [R93.5])    Surgeon:  Renetta Oden MD Provider:  Portillo Landeros MD    Anesthesia Type:  MAC ASA Status:  3          Anesthesia Type: MAC    Vitals  No vitals data found for the desired time range.          Post Anesthesia Care and Evaluation    Patient location during evaluation: bedside  Patient participation: waiting for patient participation  Level of consciousness: sleepy but conscious  Pain management: adequate  Airway patency: patent  Anesthetic complications: No anesthetic complications    Cardiovascular status: acceptable  Respiratory status: acceptable  Hydration status: acceptable    Comments: */67 (BP Location: Left arm, Patient Position: Lying)   Pulse 115   Temp 37.9 °C (100.3 °F) (Oral)   Resp 20   Ht 182.9 cm (72\")   Wt 88.2 kg (194 lb 8 oz)   SpO2 93%   BMI 26.38 kg/m²         "

## 2020-04-17 ENCOUNTER — APPOINTMENT (OUTPATIENT)
Dept: GENERAL RADIOLOGY | Facility: HOSPITAL | Age: 65
End: 2020-04-17

## 2020-04-17 PROBLEM — Z45.2 PICC (PERIPHERALLY INSERTED CENTRAL CATHETER) IN PLACE: Status: ACTIVE | Noted: 2020-04-17

## 2020-04-17 PROBLEM — E83.39 HYPOPHOSPHATEMIA: Status: ACTIVE | Noted: 2020-04-17

## 2020-04-17 PROBLEM — A41.9 SEPSIS ASSOCIATED HYPOTENSION: Status: ACTIVE | Noted: 2020-04-16

## 2020-04-17 PROBLEM — R33.8 ACUTE URINARY RETENTION: Status: ACTIVE | Noted: 2020-04-17

## 2020-04-17 LAB
ALBUMIN SERPL-MCNC: 3 G/DL (ref 3.5–5.2)
ALBUMIN/GLOB SERPL: 1 G/DL
ALP SERPL-CCNC: 46 U/L (ref 39–117)
ALT SERPL W P-5'-P-CCNC: 19 U/L (ref 1–41)
ANION GAP SERPL CALCULATED.3IONS-SCNC: 9.7 MMOL/L (ref 5–15)
ARTERIAL PATENCY WRIST A: POSITIVE
AST SERPL-CCNC: 17 U/L (ref 1–40)
ATMOSPHERIC PRESS: 753.9 MMHG
BASE EXCESS BLDA CALC-SCNC: -2.6 MMOL/L (ref 0–2)
BASOPHILS # BLD AUTO: 0.06 10*3/MM3 (ref 0–0.2)
BASOPHILS NFR BLD AUTO: 0.4 % (ref 0–1.5)
BDY SITE: ABNORMAL
BILIRUB SERPL-MCNC: 0.8 MG/DL (ref 0.2–1.2)
BUN BLD-MCNC: 20 MG/DL (ref 8–23)
BUN/CREAT SERPL: 21.3 (ref 7–25)
CALCIUM SPEC-SCNC: 8.1 MG/DL (ref 8.6–10.5)
CHLORIDE SERPL-SCNC: 102 MMOL/L (ref 98–107)
CO2 SERPL-SCNC: 20.3 MMOL/L (ref 22–29)
CREAT BLD-MCNC: 0.94 MG/DL (ref 0.76–1.27)
DEPRECATED RDW RBC AUTO: 41.9 FL (ref 37–54)
EOSINOPHIL # BLD AUTO: 0.03 10*3/MM3 (ref 0–0.4)
EOSINOPHIL NFR BLD AUTO: 0.2 % (ref 0.3–6.2)
ERYTHROCYTE [DISTWIDTH] IN BLOOD BY AUTOMATED COUNT: 14 % (ref 12.3–15.4)
GFR SERPL CREATININE-BSD FRML MDRD: 80 ML/MIN/1.73
GLOBULIN UR ELPH-MCNC: 3.1 GM/DL
GLUCOSE BLD-MCNC: 212 MG/DL (ref 65–99)
GLUCOSE BLDC GLUCOMTR-MCNC: 121 MG/DL (ref 70–130)
GLUCOSE BLDC GLUCOMTR-MCNC: 121 MG/DL (ref 70–130)
GLUCOSE BLDC GLUCOMTR-MCNC: 128 MG/DL (ref 70–130)
GLUCOSE BLDC GLUCOMTR-MCNC: 139 MG/DL (ref 70–130)
GLUCOSE BLDC GLUCOMTR-MCNC: 170 MG/DL (ref 70–130)
HCO3 BLDA-SCNC: 23 MMOL/L (ref 22–28)
HCT VFR BLD AUTO: 36 % (ref 37.5–51)
HGB BLD-MCNC: 12.1 G/DL (ref 13–17.7)
IMM GRANULOCYTES # BLD AUTO: 0.34 10*3/MM3 (ref 0–0.05)
IMM GRANULOCYTES NFR BLD AUTO: 2 % (ref 0–0.5)
LYMPHOCYTES # BLD AUTO: 0.38 10*3/MM3 (ref 0.7–3.1)
LYMPHOCYTES NFR BLD AUTO: 2.2 % (ref 19.6–45.3)
MAGNESIUM SERPL-MCNC: 1.8 MG/DL (ref 1.6–2.4)
MCH RBC QN AUTO: 27.6 PG (ref 26.6–33)
MCHC RBC AUTO-ENTMCNC: 33.6 G/DL (ref 31.5–35.7)
MCV RBC AUTO: 82.2 FL (ref 79–97)
MODALITY: ABNORMAL
MONOCYTES # BLD AUTO: 0.52 10*3/MM3 (ref 0.1–0.9)
MONOCYTES NFR BLD AUTO: 3.1 % (ref 5–12)
MRSA DNA SPEC QL NAA+PROBE: NORMAL
NEUTROPHILS # BLD AUTO: 15.6 10*3/MM3 (ref 1.7–7)
NEUTROPHILS NFR BLD AUTO: 92.1 % (ref 42.7–76)
NRBC BLD AUTO-RTO: 0 /100 WBC (ref 0–0.2)
PCO2 BLDA: 41.8 MM HG (ref 35–45)
PH BLDA: 7.35 PH UNITS (ref 7.35–7.45)
PHOSPHATE SERPL-MCNC: 1.7 MG/DL (ref 2.5–4.5)
PLATELET # BLD AUTO: 184 10*3/MM3 (ref 140–450)
PMV BLD AUTO: 9.9 FL (ref 6–12)
PO2 BLDA: 50.8 MM HG (ref 80–100)
POTASSIUM BLD-SCNC: 4.1 MMOL/L (ref 3.5–5.2)
PREALB SERPL-MCNC: 14.6 MG/DL (ref 20–40)
PROCALCITONIN SERPL-MCNC: 2.36 NG/ML (ref 0.1–0.25)
PROT SERPL-MCNC: 6.1 G/DL (ref 6–8.5)
RBC # BLD AUTO: 4.38 10*6/MM3 (ref 4.14–5.8)
SAO2 % BLDCOA: 83.7 % (ref 92–99)
SODIUM BLD-SCNC: 132 MMOL/L (ref 136–145)
TOTAL RATE: 18 BREATHS/MINUTE
TRIGL SERPL-MCNC: 80 MG/DL (ref 0–150)
WBC NRBC COR # BLD: 16.93 10*3/MM3 (ref 3.4–10.8)

## 2020-04-17 PROCEDURE — 82962 GLUCOSE BLOOD TEST: CPT

## 2020-04-17 PROCEDURE — 84100 ASSAY OF PHOSPHORUS: CPT | Performed by: THORACIC SURGERY (CARDIOTHORACIC VASCULAR SURGERY)

## 2020-04-17 PROCEDURE — 25010000002 HYDROMORPHONE PER 4 MG: Performed by: INTERNAL MEDICINE

## 2020-04-17 PROCEDURE — 25010000002 PIPERACILLIN SOD-TAZOBACTAM PER 1 G: Performed by: INTERNAL MEDICINE

## 2020-04-17 PROCEDURE — 84478 ASSAY OF TRIGLYCERIDES: CPT | Performed by: THORACIC SURGERY (CARDIOTHORACIC VASCULAR SURGERY)

## 2020-04-17 PROCEDURE — 83735 ASSAY OF MAGNESIUM: CPT | Performed by: THORACIC SURGERY (CARDIOTHORACIC VASCULAR SURGERY)

## 2020-04-17 PROCEDURE — 87641 MR-STAPH DNA AMP PROBE: CPT | Performed by: INTERNAL MEDICINE

## 2020-04-17 PROCEDURE — 85025 COMPLETE CBC W/AUTO DIFF WBC: CPT | Performed by: INTERNAL MEDICINE

## 2020-04-17 PROCEDURE — 71045 X-RAY EXAM CHEST 1 VIEW: CPT

## 2020-04-17 PROCEDURE — 99231 SBSQ HOSP IP/OBS SF/LOW 25: CPT | Performed by: INTERNAL MEDICINE

## 2020-04-17 PROCEDURE — 25010000003 MAGNESIUM SULFATE 4 GM/100ML SOLUTION: Performed by: INTERNAL MEDICINE

## 2020-04-17 PROCEDURE — 80053 COMPREHEN METABOLIC PANEL: CPT | Performed by: THORACIC SURGERY (CARDIOTHORACIC VASCULAR SURGERY)

## 2020-04-17 PROCEDURE — 84145 PROCALCITONIN (PCT): CPT | Performed by: INTERNAL MEDICINE

## 2020-04-17 PROCEDURE — 99233 SBSQ HOSP IP/OBS HIGH 50: CPT | Performed by: THORACIC SURGERY (CARDIOTHORACIC VASCULAR SURGERY)

## 2020-04-17 PROCEDURE — 82803 BLOOD GASES ANY COMBINATION: CPT

## 2020-04-17 PROCEDURE — 25010000002 ONDANSETRON PER 1 MG: Performed by: INTERNAL MEDICINE

## 2020-04-17 PROCEDURE — 84134 ASSAY OF PREALBUMIN: CPT | Performed by: THORACIC SURGERY (CARDIOTHORACIC VASCULAR SURGERY)

## 2020-04-17 PROCEDURE — 36600 WITHDRAWAL OF ARTERIAL BLOOD: CPT

## 2020-04-17 RX ORDER — MAGNESIUM SULFATE HEPTAHYDRATE 40 MG/ML
2 INJECTION, SOLUTION INTRAVENOUS AS NEEDED
Status: DISCONTINUED | OUTPATIENT
Start: 2020-04-17 | End: 2020-04-30 | Stop reason: HOSPADM

## 2020-04-17 RX ORDER — MAGNESIUM SULFATE HEPTAHYDRATE 40 MG/ML
4 INJECTION, SOLUTION INTRAVENOUS AS NEEDED
Status: DISCONTINUED | OUTPATIENT
Start: 2020-04-17 | End: 2020-04-30 | Stop reason: HOSPADM

## 2020-04-17 RX ORDER — POTASSIUM CHLORIDE 750 MG/1
40 CAPSULE, EXTENDED RELEASE ORAL AS NEEDED
Status: DISCONTINUED | OUTPATIENT
Start: 2020-04-17 | End: 2020-04-30 | Stop reason: HOSPADM

## 2020-04-17 RX ORDER — POTASSIUM CHLORIDE 1.5 G/1.77G
40 POWDER, FOR SOLUTION ORAL AS NEEDED
Status: DISCONTINUED | OUTPATIENT
Start: 2020-04-17 | End: 2020-04-30 | Stop reason: HOSPADM

## 2020-04-17 RX ORDER — POTASSIUM CHLORIDE 29.8 MG/ML
20 INJECTION INTRAVENOUS
Status: DISCONTINUED | OUTPATIENT
Start: 2020-04-17 | End: 2020-04-30 | Stop reason: HOSPADM

## 2020-04-17 RX ADMIN — HYDROMORPHONE HYDROCHLORIDE 0.5 MG: 1 INJECTION, SOLUTION INTRAMUSCULAR; INTRAVENOUS; SUBCUTANEOUS at 15:06

## 2020-04-17 RX ADMIN — DEXTROSE AND SODIUM CHLORIDE 150 ML/HR: 5; 900 INJECTION, SOLUTION INTRAVENOUS at 17:41

## 2020-04-17 RX ADMIN — ONDANSETRON 4 MG: 2 INJECTION INTRAMUSCULAR; INTRAVENOUS at 17:38

## 2020-04-17 RX ADMIN — SODIUM CHLORIDE, PRESERVATIVE FREE 10 ML: 5 INJECTION INTRAVENOUS at 09:10

## 2020-04-17 RX ADMIN — TAZOBACTAM SODIUM AND PIPERACILLIN SODIUM 3.38 G: 375; 3 INJECTION, SOLUTION INTRAVENOUS at 12:01

## 2020-04-17 RX ADMIN — SODIUM CHLORIDE, PRESERVATIVE FREE 10 ML: 5 INJECTION INTRAVENOUS at 09:09

## 2020-04-17 RX ADMIN — SODIUM PHOSPHATE, MONOBASIC, MONOHYDRATE 20 MMOL: 276; 142 INJECTION, SOLUTION INTRAVENOUS at 09:11

## 2020-04-17 RX ADMIN — TAZOBACTAM SODIUM AND PIPERACILLIN SODIUM 3.38 G: 375; 3 INJECTION, SOLUTION INTRAVENOUS at 03:33

## 2020-04-17 RX ADMIN — MAGNESIUM SULFATE 4 G: 4 INJECTION INTRAVENOUS at 09:11

## 2020-04-17 RX ADMIN — HYDROMORPHONE HYDROCHLORIDE 0.5 MG: 1 INJECTION, SOLUTION INTRAMUSCULAR; INTRAVENOUS; SUBCUTANEOUS at 22:03

## 2020-04-17 RX ADMIN — DEXTROSE AND SODIUM CHLORIDE 150 ML/HR: 5; 900 INJECTION, SOLUTION INTRAVENOUS at 23:54

## 2020-04-17 RX ADMIN — ONDANSETRON 4 MG: 2 INJECTION INTRAMUSCULAR; INTRAVENOUS at 03:10

## 2020-04-17 RX ADMIN — SUCRALFATE 1 G: 1 SUSPENSION ORAL at 08:37

## 2020-04-17 RX ADMIN — SUCRALFATE 1 G: 1 SUSPENSION ORAL at 12:01

## 2020-04-17 RX ADMIN — DEXTROSE AND SODIUM CHLORIDE 150 ML/HR: 5; 900 INJECTION, SOLUTION INTRAVENOUS at 03:29

## 2020-04-17 RX ADMIN — PANTOPRAZOLE SODIUM 40 MG: 40 INJECTION, POWDER, FOR SOLUTION INTRAVENOUS at 17:24

## 2020-04-17 RX ADMIN — PANTOPRAZOLE SODIUM 40 MG: 40 INJECTION, POWDER, FOR SOLUTION INTRAVENOUS at 08:36

## 2020-04-17 RX ADMIN — HYDROMORPHONE HYDROCHLORIDE 0.5 MG: 1 INJECTION, SOLUTION INTRAMUSCULAR; INTRAVENOUS; SUBCUTANEOUS at 08:36

## 2020-04-17 RX ADMIN — DEXTROSE AND SODIUM CHLORIDE 150 ML/HR: 5; 900 INJECTION, SOLUTION INTRAVENOUS at 10:49

## 2020-04-17 RX ADMIN — TAZOBACTAM SODIUM AND PIPERACILLIN SODIUM 3.38 G: 375; 3 INJECTION, SOLUTION INTRAVENOUS at 18:43

## 2020-04-17 RX ADMIN — SUCRALFATE 1 G: 1 SUSPENSION ORAL at 17:24

## 2020-04-17 RX ADMIN — SUCRALFATE 1 G: 1 SUSPENSION ORAL at 20:07

## 2020-04-18 ENCOUNTER — ANESTHESIA EVENT (OUTPATIENT)
Dept: CARDIOVASCULAR ICU | Facility: HOSPITAL | Age: 65
End: 2020-04-18

## 2020-04-18 ENCOUNTER — APPOINTMENT (OUTPATIENT)
Dept: GENERAL RADIOLOGY | Facility: HOSPITAL | Age: 65
End: 2020-04-18

## 2020-04-18 ENCOUNTER — ANESTHESIA (OUTPATIENT)
Dept: CARDIOVASCULAR ICU | Facility: HOSPITAL | Age: 65
End: 2020-04-18

## 2020-04-18 PROBLEM — S11.21XA ESOPHAGEAL TEAR: Status: ACTIVE | Noted: 2020-04-18

## 2020-04-18 LAB
ANION GAP SERPL CALCULATED.3IONS-SCNC: 9.4 MMOL/L (ref 5–15)
ARTERIAL PATENCY WRIST A: POSITIVE
ARTERIAL PATENCY WRIST A: POSITIVE
ATMOSPHERIC PRESS: 749.2 MMHG
ATMOSPHERIC PRESS: 752.9 MMHG
BASE EXCESS BLDA CALC-SCNC: -2.1 MMOL/L (ref 0–2)
BASE EXCESS BLDA CALC-SCNC: 0.1 MMOL/L (ref 0–2)
BDY SITE: ABNORMAL
BDY SITE: ABNORMAL
BUN BLD-MCNC: 11 MG/DL (ref 8–23)
BUN/CREAT SERPL: 14.5 (ref 7–25)
CALCIUM SPEC-SCNC: 8.1 MG/DL (ref 8.6–10.5)
CHLORIDE SERPL-SCNC: 103 MMOL/L (ref 98–107)
CO2 SERPL-SCNC: 22.6 MMOL/L (ref 22–29)
CREAT BLD-MCNC: 0.76 MG/DL (ref 0.76–1.27)
DEPRECATED RDW RBC AUTO: 42 FL (ref 37–54)
ERYTHROCYTE [DISTWIDTH] IN BLOOD BY AUTOMATED COUNT: 13.9 % (ref 12.3–15.4)
GFR SERPL CREATININE-BSD FRML MDRD: 102 ML/MIN/1.73
GLUCOSE BLD-MCNC: 142 MG/DL (ref 65–99)
GLUCOSE BLDC GLUCOMTR-MCNC: 103 MG/DL (ref 70–130)
GLUCOSE BLDC GLUCOMTR-MCNC: 108 MG/DL (ref 70–130)
GLUCOSE BLDC GLUCOMTR-MCNC: 95 MG/DL (ref 70–130)
GLUCOSE BLDC GLUCOMTR-MCNC: 96 MG/DL (ref 70–130)
HCO3 BLDA-SCNC: 23.3 MMOL/L (ref 22–28)
HCO3 BLDA-SCNC: 24.3 MMOL/L (ref 22–28)
HCT VFR BLD AUTO: 34 % (ref 37.5–51)
HGB BLD-MCNC: 11.4 G/DL (ref 13–17.7)
HOROWITZ INDEX BLD+IHG-RTO: 100 %
HOROWITZ INDEX BLD+IHG-RTO: 100 %
MCH RBC QN AUTO: 27.8 PG (ref 26.6–33)
MCHC RBC AUTO-ENTMCNC: 33.5 G/DL (ref 31.5–35.7)
MCV RBC AUTO: 82.9 FL (ref 79–97)
MODALITY: ABNORMAL
MODALITY: ABNORMAL
O2 A-A PPRESDIFF RESPIRATORY: 0.1 MMHG
O2 A-A PPRESDIFF RESPIRATORY: 0.1 MMHG
PCO2 BLDA: 37.4 MM HG (ref 35–45)
PCO2 BLDA: 41.2 MM HG (ref 35–45)
PEEP RESPIRATORY: 10 CM[H2O]
PH BLDA: 7.36 PH UNITS (ref 7.35–7.45)
PH BLDA: 7.42 PH UNITS (ref 7.35–7.45)
PHOSPHATE SERPL-MCNC: 1.2 MG/DL (ref 2.5–4.5)
PLATELET # BLD AUTO: 177 10*3/MM3 (ref 140–450)
PMV BLD AUTO: 9.8 FL (ref 6–12)
PO2 BLDA: 51.7 MM HG (ref 80–100)
PO2 BLDA: 66.7 MM HG (ref 80–100)
POTASSIUM BLD-SCNC: 3.4 MMOL/L (ref 3.5–5.2)
RBC # BLD AUTO: 4.1 10*6/MM3 (ref 4.14–5.8)
SAO2 % BLDCOA: 87.1 % (ref 92–99)
SAO2 % BLDCOA: 92.1 % (ref 92–99)
SET MECH RESP RATE: 20
SODIUM BLD-SCNC: 135 MMOL/L (ref 136–145)
TOTAL RATE: 20 BREATHS/MINUTE
TOTAL RATE: 20 BREATHS/MINUTE
VENTILATOR MODE: ABNORMAL
WBC NRBC COR # BLD: 14.41 10*3/MM3 (ref 3.4–10.8)

## 2020-04-18 PROCEDURE — 94799 UNLISTED PULMONARY SVC/PX: CPT

## 2020-04-18 PROCEDURE — 0BH17EZ INSERTION OF ENDOTRACHEAL AIRWAY INTO TRACHEA, VIA NATURAL OR ARTIFICIAL OPENING: ICD-10-PCS | Performed by: INTERNAL MEDICINE

## 2020-04-18 PROCEDURE — 94002 VENT MGMT INPAT INIT DAY: CPT

## 2020-04-18 PROCEDURE — 84100 ASSAY OF PHOSPHORUS: CPT | Performed by: INTERNAL MEDICINE

## 2020-04-18 PROCEDURE — 71045 X-RAY EXAM CHEST 1 VIEW: CPT

## 2020-04-18 PROCEDURE — 82803 BLOOD GASES ANY COMBINATION: CPT

## 2020-04-18 PROCEDURE — 82962 GLUCOSE BLOOD TEST: CPT

## 2020-04-18 PROCEDURE — 25010000002 PIPERACILLIN SOD-TAZOBACTAM PER 1 G: Performed by: INTERNAL MEDICINE

## 2020-04-18 PROCEDURE — 25010000002 FENTANYL CITRATE (PF) 100 MCG/2ML SOLUTION: Performed by: INTERNAL MEDICINE

## 2020-04-18 PROCEDURE — 99233 SBSQ HOSP IP/OBS HIGH 50: CPT | Performed by: THORACIC SURGERY (CARDIOTHORACIC VASCULAR SURGERY)

## 2020-04-18 PROCEDURE — 25010000002 PROPOFOL 10 MG/ML EMULSION

## 2020-04-18 PROCEDURE — 85027 COMPLETE CBC AUTOMATED: CPT | Performed by: NURSE PRACTITIONER

## 2020-04-18 PROCEDURE — 25010000003 POTASSIUM CHLORIDE PER 2 MEQ: Performed by: INTERNAL MEDICINE

## 2020-04-18 PROCEDURE — 99232 SBSQ HOSP IP/OBS MODERATE 35: CPT | Performed by: INTERNAL MEDICINE

## 2020-04-18 PROCEDURE — 25010000002 SUCCINYLCHOLINE PER 20 MG

## 2020-04-18 PROCEDURE — 36600 WITHDRAWAL OF ARTERIAL BLOOD: CPT

## 2020-04-18 PROCEDURE — 94770: CPT

## 2020-04-18 PROCEDURE — 31500 INSERT EMERGENCY AIRWAY: CPT | Performed by: ANESTHESIOLOGY

## 2020-04-18 PROCEDURE — 25010000002 PROPOFOL 10 MG/ML EMULSION: Performed by: INTERNAL MEDICINE

## 2020-04-18 PROCEDURE — 25010000002 HYDROMORPHONE PER 4 MG: Performed by: INTERNAL MEDICINE

## 2020-04-18 PROCEDURE — 25010000002 ONDANSETRON PER 1 MG: Performed by: INTERNAL MEDICINE

## 2020-04-18 PROCEDURE — 5A1955Z RESPIRATORY VENTILATION, GREATER THAN 96 CONSECUTIVE HOURS: ICD-10-PCS | Performed by: INTERNAL MEDICINE

## 2020-04-18 PROCEDURE — 80048 BASIC METABOLIC PNL TOTAL CA: CPT | Performed by: NURSE PRACTITIONER

## 2020-04-18 RX ORDER — CHLORHEXIDINE GLUCONATE 0.12 MG/ML
15 RINSE ORAL EVERY 12 HOURS SCHEDULED
Status: DISCONTINUED | OUTPATIENT
Start: 2020-04-18 | End: 2020-04-26

## 2020-04-18 RX ORDER — SUCCINYLCHOLINE CHLORIDE 20 MG/ML
INJECTION INTRAMUSCULAR; INTRAVENOUS
Status: COMPLETED
Start: 2020-04-18 | End: 2020-04-18

## 2020-04-18 RX ORDER — PROPOFOL 10 MG/ML
VIAL (ML) INTRAVENOUS
Status: COMPLETED
Start: 2020-04-18 | End: 2020-04-18

## 2020-04-18 RX ORDER — FENTANYL CITRATE 50 UG/ML
50 INJECTION, SOLUTION INTRAMUSCULAR; INTRAVENOUS
Status: DISCONTINUED | OUTPATIENT
Start: 2020-04-18 | End: 2020-04-26

## 2020-04-18 RX ADMIN — PANTOPRAZOLE SODIUM 40 MG: 40 INJECTION, POWDER, FOR SOLUTION INTRAVENOUS at 06:44

## 2020-04-18 RX ADMIN — PANTOPRAZOLE SODIUM 40 MG: 40 INJECTION, POWDER, FOR SOLUTION INTRAVENOUS at 17:47

## 2020-04-18 RX ADMIN — PROPOFOL 200 MG: 10 INJECTION, EMULSION INTRAVENOUS at 14:30

## 2020-04-18 RX ADMIN — FENTANYL CITRATE 50 MCG: 0.05 INJECTION, SOLUTION INTRAMUSCULAR; INTRAVENOUS at 23:36

## 2020-04-18 RX ADMIN — TAZOBACTAM SODIUM AND PIPERACILLIN SODIUM 3.38 G: 375; 3 INJECTION, SOLUTION INTRAVENOUS at 18:48

## 2020-04-18 RX ADMIN — SUCRALFATE 1 G: 1 SUSPENSION ORAL at 06:44

## 2020-04-18 RX ADMIN — HYDROMORPHONE HYDROCHLORIDE 0.5 MG: 1 INJECTION, SOLUTION INTRAMUSCULAR; INTRAVENOUS; SUBCUTANEOUS at 10:01

## 2020-04-18 RX ADMIN — FENTANYL CITRATE 50 MCG: 0.05 INJECTION, SOLUTION INTRAMUSCULAR; INTRAVENOUS at 16:02

## 2020-04-18 RX ADMIN — TAZOBACTAM SODIUM AND PIPERACILLIN SODIUM 3.38 G: 375; 3 INJECTION, SOLUTION INTRAVENOUS at 03:58

## 2020-04-18 RX ADMIN — HYDROMORPHONE HYDROCHLORIDE 0.5 MG: 1 INJECTION, SOLUTION INTRAMUSCULAR; INTRAVENOUS; SUBCUTANEOUS at 05:10

## 2020-04-18 RX ADMIN — SUCCINYLCHOLINE CHLORIDE: 20 INJECTION, SOLUTION INTRAMUSCULAR; INTRAVENOUS at 14:29

## 2020-04-18 RX ADMIN — TAZOBACTAM SODIUM AND PIPERACILLIN SODIUM 3.38 G: 375; 3 INJECTION, SOLUTION INTRAVENOUS at 12:15

## 2020-04-18 RX ADMIN — POTASSIUM PHOSPHATE, MONOBASIC AND POTASSIUM PHOSPHATE, DIBASIC 45 MMOL: 224; 236 INJECTION, SOLUTION INTRAVENOUS at 08:32

## 2020-04-18 RX ADMIN — POTASSIUM CHLORIDE 100 ML/HR: 149 INJECTION, SOLUTION, CONCENTRATE INTRAVENOUS at 14:41

## 2020-04-18 RX ADMIN — POTASSIUM CHLORIDE 20 MEQ: 2 INJECTION, SOLUTION, CONCENTRATE INTRAVENOUS at 08:32

## 2020-04-18 RX ADMIN — PROPOFOL 30 MCG/KG/MIN: 10 INJECTION, EMULSION INTRAVENOUS at 22:48

## 2020-04-18 RX ADMIN — FENTANYL CITRATE 50 MCG: 0.05 INJECTION, SOLUTION INTRAMUSCULAR; INTRAVENOUS at 12:57

## 2020-04-18 RX ADMIN — HYDROMORPHONE HYDROCHLORIDE 0.5 MG: 1 INJECTION, SOLUTION INTRAMUSCULAR; INTRAVENOUS; SUBCUTANEOUS at 01:06

## 2020-04-18 RX ADMIN — POTASSIUM CHLORIDE 20 MEQ: 2 INJECTION, SOLUTION, CONCENTRATE INTRAVENOUS at 06:44

## 2020-04-18 RX ADMIN — ONDANSETRON 4 MG: 2 INJECTION INTRAMUSCULAR; INTRAVENOUS at 05:10

## 2020-04-18 RX ADMIN — ONDANSETRON 4 MG: 2 INJECTION INTRAMUSCULAR; INTRAVENOUS at 10:00

## 2020-04-18 RX ADMIN — PROPOFOL: 10 INJECTION, EMULSION INTRAVENOUS at 18:45

## 2020-04-18 RX ADMIN — DEXMEDETOMIDINE HYDROCHLORIDE 1.4 MCG/KG/HR: 100 INJECTION, SOLUTION, CONCENTRATE INTRAVENOUS at 17:47

## 2020-04-18 RX ADMIN — DEXTROSE AND SODIUM CHLORIDE 150 ML/HR: 5; 900 INJECTION, SOLUTION INTRAVENOUS at 04:28

## 2020-04-18 RX ADMIN — SUCCINYLCHOLINE CHLORIDE: 20 INJECTION, SOLUTION INTRAMUSCULAR; INTRAVENOUS at 18:45

## 2020-04-18 RX ADMIN — CHLORHEXIDINE GLUCONATE 15 ML: 1.2 RINSE ORAL at 20:36

## 2020-04-18 RX ADMIN — DEXMEDETOMIDINE HYDROCHLORIDE 0.2 MCG/KG/HR: 100 INJECTION, SOLUTION, CONCENTRATE INTRAVENOUS at 13:00

## 2020-04-18 NOTE — ANESTHESIA PROCEDURE NOTES
Airway  Urgency: emergent    Airway not difficult    General Information and Staff    Patient location during procedure: ICU  Anesthesiologist: Yola Spain MD    Consent for Airway (if performed for an anesthetic, see related documentation for consents)  Patient identity confirmed: verbally with patient and arm band  Consent: The procedure was performed in an emergent situation. Verbal consent obtained. Written consent not obtained.  Risks and benefits: risks, benefits and alternatives were discussed  Consent given by: patient      Indications and Patient Condition  Indications for airway management: airway protection    Preoxygenated: yes  Mask difficulty assessment: 0 - not attempted    Final Airway Details  Final airway type: endotracheal airway      Successful airway: ETT  Cuffed: yes   Successful intubation technique: video laryngoscopy  Facilitating devices/methods: intubating stylet  Endotracheal tube insertion site: oral  Blade: Glidescope  Blade size: 4  ETT size (mm): 8.0  Cormack-Lehane Classification: grade I - full view of glottis  Placement verified by: chest auscultation and capnometry   Measured from: gums  Number of attempts at approach: 1  Assessment: lips, teeth, and gum same as pre-op and atraumatic intubation    Additional Comments  Called @ noon by RN requesting intubation request by Dr. Wilson for Mr. Youssef.  To pt's bedisde @ 12:05.  Verified negative Covid 19 test.  Discussed w/ pt need for & plan to intubate, risks, & obtained verbal consent.  Also verified w/ pt & RN that pt had spoken to family as desired prior to procedure.  Requested RT to place NRB on pt for approx 10 minutes prior to intubation.  Performed RSI w/ appropriate PPE (gown, N95, face shield, gloves).  Atraumatic intubation.  RT connected ETT to vent, checked b/l & equal breath sounds.  Tube secured @23 cm.  CXR Pending.

## 2020-04-18 NOTE — ANESTHESIA PROCEDURE NOTES
Airway  Urgency: emergent    End Time:4/18/2020 6:36 PM  Airway not difficult    General Information and Staff    Patient location during procedure: ICU  Anesthesiologist: Yola Spain MD    Consent for Airway (if performed for an anesthetic, see related documentation for consents)  Patient identity confirmed: arm band  Consent: Verbal consent not obtained. Written consent not obtained.  Risks and benefits: risks, benefits and alternatives were discussed  Consent given by: prior consent obtained.      Indications and Patient Condition  Indications for airway management: respiratory distress/failure and airway protection    Preoxygenated: yes  Mask difficulty assessment: 0 - not attempted    Final Airway Details  Final airway type: endotracheal airway      Successful airway: ETT  Cuffed: yes   Successful intubation technique: video laryngoscopy  Facilitating devices/methods: intubating stylet  Endotracheal tube insertion site: oral  Blade: Glidescope  Blade size: 4  ETT size (mm): 8.0  Cormack-Lehane Classification: grade I - full view of glottis  Placement verified by: chest auscultation and capnometry   Measured from: gums  ETT/EBT to gums (cm): 23  Number of attempts at approach: 1  Assessment: lips, teeth, and gum same as pre-op    Additional Comments  Called by RN b/c pt self extubated.  To bedside.  Pt sedated.  preO2 underway upon arrival.  O2 sats 91%.  Propofol 100 mg, sux 120 mg IVP x 1.  Intubated w/o diffculty or trauma.  +etco2 on color change. +bsbe. cxr ordered, pending.

## 2020-04-19 ENCOUNTER — APPOINTMENT (OUTPATIENT)
Dept: GENERAL RADIOLOGY | Facility: HOSPITAL | Age: 65
End: 2020-04-19

## 2020-04-19 LAB
ANION GAP SERPL CALCULATED.3IONS-SCNC: 10.1 MMOL/L (ref 5–15)
BASOPHILS # BLD AUTO: 0.02 10*3/MM3 (ref 0–0.2)
BASOPHILS NFR BLD AUTO: 0.3 % (ref 0–1.5)
BUN BLD-MCNC: 16 MG/DL (ref 8–23)
BUN/CREAT SERPL: 16 (ref 7–25)
CALCIUM SPEC-SCNC: 8.2 MG/DL (ref 8.6–10.5)
CHLORIDE SERPL-SCNC: 100 MMOL/L (ref 98–107)
CO2 SERPL-SCNC: 21.9 MMOL/L (ref 22–29)
CREAT BLD-MCNC: 1 MG/DL (ref 0.76–1.27)
DEPRECATED RDW RBC AUTO: 43.8 FL (ref 37–54)
EOSINOPHIL # BLD AUTO: 0.25 10*3/MM3 (ref 0–0.4)
EOSINOPHIL NFR BLD AUTO: 3.8 % (ref 0.3–6.2)
ERYTHROCYTE [DISTWIDTH] IN BLOOD BY AUTOMATED COUNT: 14.2 % (ref 12.3–15.4)
GFR SERPL CREATININE-BSD FRML MDRD: 75 ML/MIN/1.73
GLUCOSE BLD-MCNC: 97 MG/DL (ref 65–99)
GLUCOSE BLDC GLUCOMTR-MCNC: 119 MG/DL (ref 70–130)
GLUCOSE BLDC GLUCOMTR-MCNC: 79 MG/DL (ref 70–130)
GLUCOSE BLDC GLUCOMTR-MCNC: 96 MG/DL (ref 70–130)
GLUCOSE BLDC GLUCOMTR-MCNC: 98 MG/DL (ref 70–130)
HCT VFR BLD AUTO: 23.1 % (ref 37.5–51)
HGB BLD-MCNC: 8.2 G/DL (ref 13–17.7)
IMM GRANULOCYTES # BLD AUTO: 0.04 10*3/MM3 (ref 0–0.05)
IMM GRANULOCYTES NFR BLD AUTO: 0.6 % (ref 0–0.5)
LYMPHOCYTES # BLD AUTO: 0.81 10*3/MM3 (ref 0.7–3.1)
LYMPHOCYTES NFR BLD AUTO: 12.3 % (ref 19.6–45.3)
MCH RBC QN AUTO: 29.9 PG (ref 26.6–33)
MCHC RBC AUTO-ENTMCNC: 35.5 G/DL (ref 31.5–35.7)
MCV RBC AUTO: 84.3 FL (ref 79–97)
MONOCYTES # BLD AUTO: 0.47 10*3/MM3 (ref 0.1–0.9)
MONOCYTES NFR BLD AUTO: 7.2 % (ref 5–12)
NEUTROPHILS # BLD AUTO: 4.97 10*3/MM3 (ref 1.7–7)
NEUTROPHILS NFR BLD AUTO: 75.8 % (ref 42.7–76)
NRBC BLD AUTO-RTO: 0 /100 WBC (ref 0–0.2)
PHOSPHATE SERPL-MCNC: 2.3 MG/DL (ref 2.5–4.5)
PLATELET # BLD AUTO: 135 10*3/MM3 (ref 140–450)
PMV BLD AUTO: 10.3 FL (ref 6–12)
POTASSIUM BLD-SCNC: 3.9 MMOL/L (ref 3.5–5.2)
RBC # BLD AUTO: 2.74 10*6/MM3 (ref 4.14–5.8)
SODIUM BLD-SCNC: 132 MMOL/L (ref 136–145)
WBC NRBC COR # BLD: 6.56 10*3/MM3 (ref 3.4–10.8)

## 2020-04-19 PROCEDURE — 25010000002 PIPERACILLIN SOD-TAZOBACTAM PER 1 G: Performed by: INTERNAL MEDICINE

## 2020-04-19 PROCEDURE — 25010000002 PROPOFOL 10 MG/ML EMULSION: Performed by: INTERNAL MEDICINE

## 2020-04-19 PROCEDURE — 85025 COMPLETE CBC W/AUTO DIFF WBC: CPT | Performed by: THORACIC SURGERY (CARDIOTHORACIC VASCULAR SURGERY)

## 2020-04-19 PROCEDURE — 74018 RADEX ABDOMEN 1 VIEW: CPT

## 2020-04-19 PROCEDURE — 82962 GLUCOSE BLOOD TEST: CPT

## 2020-04-19 PROCEDURE — 94799 UNLISTED PULMONARY SVC/PX: CPT

## 2020-04-19 PROCEDURE — 84100 ASSAY OF PHOSPHORUS: CPT | Performed by: INTERNAL MEDICINE

## 2020-04-19 PROCEDURE — 94770: CPT

## 2020-04-19 PROCEDURE — 80048 BASIC METABOLIC PNL TOTAL CA: CPT | Performed by: INTERNAL MEDICINE

## 2020-04-19 PROCEDURE — 25010000002 FENTANYL CITRATE (PF) 100 MCG/2ML SOLUTION: Performed by: INTERNAL MEDICINE

## 2020-04-19 PROCEDURE — 94003 VENT MGMT INPAT SUBQ DAY: CPT

## 2020-04-19 PROCEDURE — 71045 X-RAY EXAM CHEST 1 VIEW: CPT

## 2020-04-19 PROCEDURE — 99232 SBSQ HOSP IP/OBS MODERATE 35: CPT | Performed by: INTERNAL MEDICINE

## 2020-04-19 PROCEDURE — 99233 SBSQ HOSP IP/OBS HIGH 50: CPT | Performed by: THORACIC SURGERY (CARDIOTHORACIC VASCULAR SURGERY)

## 2020-04-19 RX ORDER — CLOMIPRAMINE HYDROCHLORIDE 50 MG/1
100 CAPSULE ORAL NIGHTLY
Status: DISCONTINUED | OUTPATIENT
Start: 2020-04-19 | End: 2020-04-20

## 2020-04-19 RX ADMIN — SUCRALFATE 1 G: 1 SUSPENSION ORAL at 20:04

## 2020-04-19 RX ADMIN — TAZOBACTAM SODIUM AND PIPERACILLIN SODIUM 3.38 G: 375; 3 INJECTION, SOLUTION INTRAVENOUS at 12:29

## 2020-04-19 RX ADMIN — FENTANYL CITRATE 50 MCG: 0.05 INJECTION, SOLUTION INTRAMUSCULAR; INTRAVENOUS at 21:53

## 2020-04-19 RX ADMIN — CHLORHEXIDINE GLUCONATE 15 ML: 1.2 RINSE ORAL at 08:02

## 2020-04-19 RX ADMIN — DEXMEDETOMIDINE HYDROCHLORIDE 1.5 MCG/KG/HR: 100 INJECTION, SOLUTION, CONCENTRATE INTRAVENOUS at 23:19

## 2020-04-19 RX ADMIN — DEXMEDETOMIDINE HYDROCHLORIDE 1.5 MCG/KG/HR: 100 INJECTION, SOLUTION, CONCENTRATE INTRAVENOUS at 20:04

## 2020-04-19 RX ADMIN — PANTOPRAZOLE SODIUM 40 MG: 40 INJECTION, POWDER, FOR SOLUTION INTRAVENOUS at 06:47

## 2020-04-19 RX ADMIN — TAZOBACTAM SODIUM AND PIPERACILLIN SODIUM 3.38 G: 375; 3 INJECTION, SOLUTION INTRAVENOUS at 04:14

## 2020-04-19 RX ADMIN — DEXMEDETOMIDINE HYDROCHLORIDE 0.2 MCG/KG/HR: 100 INJECTION, SOLUTION, CONCENTRATE INTRAVENOUS at 16:05

## 2020-04-19 RX ADMIN — PANTOPRAZOLE SODIUM 40 MG: 40 INJECTION, POWDER, FOR SOLUTION INTRAVENOUS at 16:37

## 2020-04-19 RX ADMIN — PROPOFOL 35 MCG/KG/MIN: 10 INJECTION, EMULSION INTRAVENOUS at 04:05

## 2020-04-19 RX ADMIN — CHLORHEXIDINE GLUCONATE 15 ML: 1.2 RINSE ORAL at 20:04

## 2020-04-19 RX ADMIN — POTASSIUM CHLORIDE 100 ML/HR: 149 INJECTION, SOLUTION, CONCENTRATE INTRAVENOUS at 23:06

## 2020-04-19 RX ADMIN — POTASSIUM CHLORIDE 100 ML/HR: 149 INJECTION, SOLUTION, CONCENTRATE INTRAVENOUS at 12:45

## 2020-04-19 RX ADMIN — FENTANYL CITRATE 50 MCG: 0.05 INJECTION, SOLUTION INTRAMUSCULAR; INTRAVENOUS at 19:53

## 2020-04-19 RX ADMIN — PROPOFOL 25 MCG/KG/MIN: 10 INJECTION, EMULSION INTRAVENOUS at 10:08

## 2020-04-19 RX ADMIN — PROPOFOL 20 MCG/KG/MIN: 10 INJECTION, EMULSION INTRAVENOUS at 22:29

## 2020-04-19 RX ADMIN — SODIUM PHOSPHATE, MONOBASIC, MONOHYDRATE 20 MMOL: 276; 142 INJECTION, SOLUTION INTRAVENOUS at 08:02

## 2020-04-19 RX ADMIN — FENTANYL CITRATE 50 MCG: 0.05 INJECTION, SOLUTION INTRAMUSCULAR; INTRAVENOUS at 14:06

## 2020-04-19 RX ADMIN — SUCRALFATE 1 G: 1 SUSPENSION ORAL at 16:37

## 2020-04-19 RX ADMIN — TAZOBACTAM SODIUM AND PIPERACILLIN SODIUM 3.38 G: 375; 3 INJECTION, SOLUTION INTRAVENOUS at 18:36

## 2020-04-19 RX ADMIN — FENTANYL CITRATE 50 MCG: 0.05 INJECTION, SOLUTION INTRAMUSCULAR; INTRAVENOUS at 23:55

## 2020-04-19 RX ADMIN — POTASSIUM CHLORIDE 100 ML/HR: 149 INJECTION, SOLUTION, CONCENTRATE INTRAVENOUS at 01:41

## 2020-04-19 RX ADMIN — PROPOFOL 15 MCG/KG/MIN: 10 INJECTION, EMULSION INTRAVENOUS at 19:09

## 2020-04-20 ENCOUNTER — APPOINTMENT (OUTPATIENT)
Dept: GENERAL RADIOLOGY | Facility: HOSPITAL | Age: 65
End: 2020-04-20

## 2020-04-20 LAB
ANION GAP SERPL CALCULATED.3IONS-SCNC: 7.9 MMOL/L (ref 5–15)
BUN BLD-MCNC: 15 MG/DL (ref 8–23)
BUN/CREAT SERPL: 21.1 (ref 7–25)
CALCIUM SPEC-SCNC: 8.4 MG/DL (ref 8.6–10.5)
CHLORIDE SERPL-SCNC: 104 MMOL/L (ref 98–107)
CO2 SERPL-SCNC: 24.1 MMOL/L (ref 22–29)
CREAT BLD-MCNC: 0.71 MG/DL (ref 0.76–1.27)
CYTO UR: NORMAL
D DIMER PPP FEU-MCNC: 2.21 MCGFEU/ML (ref 0–0.49)
DEPRECATED RDW RBC AUTO: 41.4 FL (ref 37–54)
ERYTHROCYTE [DISTWIDTH] IN BLOOD BY AUTOMATED COUNT: 13.8 % (ref 12.3–15.4)
FERRITIN SERPL-MCNC: 187 NG/ML (ref 30–400)
GFR SERPL CREATININE-BSD FRML MDRD: 111 ML/MIN/1.73
GLUCOSE BLD-MCNC: 121 MG/DL (ref 65–99)
GLUCOSE BLDC GLUCOMTR-MCNC: 104 MG/DL (ref 70–130)
GLUCOSE BLDC GLUCOMTR-MCNC: 117 MG/DL (ref 70–130)
GLUCOSE BLDC GLUCOMTR-MCNC: 122 MG/DL (ref 70–130)
GLUCOSE BLDC GLUCOMTR-MCNC: 145 MG/DL (ref 70–130)
HCT VFR BLD AUTO: 29.8 % (ref 37.5–51)
HGB BLD-MCNC: 9.7 G/DL (ref 13–17.7)
LAB AP CASE REPORT: NORMAL
LAB AP DIAGNOSIS COMMENT: NORMAL
LDH SERPL-CCNC: 109 U/L (ref 135–225)
MCH RBC QN AUTO: 27.1 PG (ref 26.6–33)
MCHC RBC AUTO-ENTMCNC: 32.6 G/DL (ref 31.5–35.7)
MCV RBC AUTO: 83.2 FL (ref 79–97)
NT-PROBNP SERPL-MCNC: 809.4 PG/ML (ref 5–900)
PATH REPORT.ADDENDUM SPEC: NORMAL
PATH REPORT.FINAL DX SPEC: NORMAL
PATH REPORT.GROSS SPEC: NORMAL
PLATELET # BLD AUTO: 169 10*3/MM3 (ref 140–450)
PMV BLD AUTO: 10 FL (ref 6–12)
POTASSIUM BLD-SCNC: 3.9 MMOL/L (ref 3.5–5.2)
RBC # BLD AUTO: 3.58 10*6/MM3 (ref 4.14–5.8)
SARS-COV-2 RNA RESP QL NAA+PROBE: NOT DETECTED
SODIUM BLD-SCNC: 136 MMOL/L (ref 136–145)
TRIGL SERPL-MCNC: 141 MG/DL (ref 0–150)
TROPONIN T SERPL-MCNC: <0.01 NG/ML (ref 0–0.03)
WBC NRBC COR # BLD: 4.97 10*3/MM3 (ref 3.4–10.8)

## 2020-04-20 PROCEDURE — 93010 ELECTROCARDIOGRAM REPORT: CPT | Performed by: INTERNAL MEDICINE

## 2020-04-20 PROCEDURE — 99232 SBSQ HOSP IP/OBS MODERATE 35: CPT | Performed by: NURSE PRACTITIONER

## 2020-04-20 PROCEDURE — 71045 X-RAY EXAM CHEST 1 VIEW: CPT

## 2020-04-20 PROCEDURE — 82962 GLUCOSE BLOOD TEST: CPT

## 2020-04-20 PROCEDURE — 25010000002 PROPOFOL 10 MG/ML EMULSION: Performed by: INTERNAL MEDICINE

## 2020-04-20 PROCEDURE — 85379 FIBRIN DEGRADATION QUANT: CPT | Performed by: INTERNAL MEDICINE

## 2020-04-20 PROCEDURE — 93005 ELECTROCARDIOGRAM TRACING: CPT | Performed by: INTERNAL MEDICINE

## 2020-04-20 PROCEDURE — 94799 UNLISTED PULMONARY SVC/PX: CPT

## 2020-04-20 PROCEDURE — 94770: CPT

## 2020-04-20 PROCEDURE — 84478 ASSAY OF TRIGLYCERIDES: CPT | Performed by: INTERNAL MEDICINE

## 2020-04-20 PROCEDURE — 83880 ASSAY OF NATRIURETIC PEPTIDE: CPT | Performed by: INTERNAL MEDICINE

## 2020-04-20 PROCEDURE — 85027 COMPLETE CBC AUTOMATED: CPT | Performed by: INTERNAL MEDICINE

## 2020-04-20 PROCEDURE — 80048 BASIC METABOLIC PNL TOTAL CA: CPT | Performed by: INTERNAL MEDICINE

## 2020-04-20 PROCEDURE — 94003 VENT MGMT INPAT SUBQ DAY: CPT

## 2020-04-20 PROCEDURE — 84484 ASSAY OF TROPONIN QUANT: CPT | Performed by: INTERNAL MEDICINE

## 2020-04-20 PROCEDURE — 25010000002 FENTANYL CITRATE (PF) 100 MCG/2ML SOLUTION: Performed by: INTERNAL MEDICINE

## 2020-04-20 PROCEDURE — 82728 ASSAY OF FERRITIN: CPT | Performed by: INTERNAL MEDICINE

## 2020-04-20 PROCEDURE — 87635 SARS-COV-2 COVID-19 AMP PRB: CPT | Performed by: INTERNAL MEDICINE

## 2020-04-20 PROCEDURE — 83615 LACTATE (LD) (LDH) ENZYME: CPT | Performed by: INTERNAL MEDICINE

## 2020-04-20 PROCEDURE — 99232 SBSQ HOSP IP/OBS MODERATE 35: CPT | Performed by: INTERNAL MEDICINE

## 2020-04-20 PROCEDURE — 25010000002 PIPERACILLIN SOD-TAZOBACTAM PER 1 G: Performed by: INTERNAL MEDICINE

## 2020-04-20 RX ORDER — CLOMIPRAMINE HYDROCHLORIDE 75 MG/1
150 CAPSULE ORAL NIGHTLY
Status: DISCONTINUED | OUTPATIENT
Start: 2020-04-20 | End: 2020-04-20

## 2020-04-20 RX ORDER — DEXMEDETOMIDINE HYDROCHLORIDE 4 UG/ML
.2-1.5 INJECTION INTRAVENOUS
Status: DISCONTINUED | OUTPATIENT
Start: 2020-04-20 | End: 2020-04-26

## 2020-04-20 RX ORDER — CLOMIPRAMINE HYDROCHLORIDE 50 MG/1
150 CAPSULE ORAL NIGHTLY
Status: DISCONTINUED | OUTPATIENT
Start: 2020-04-20 | End: 2020-04-28

## 2020-04-20 RX ADMIN — CHLORHEXIDINE GLUCONATE 15 ML: 1.2 RINSE ORAL at 08:24

## 2020-04-20 RX ADMIN — DEXMEDETOMIDINE HYDROCHLORIDE 0.9 MCG/KG/HR: 100 INJECTION, SOLUTION, CONCENTRATE INTRAVENOUS at 04:56

## 2020-04-20 RX ADMIN — FENTANYL CITRATE 50 MCG: 0.05 INJECTION, SOLUTION INTRAMUSCULAR; INTRAVENOUS at 06:08

## 2020-04-20 RX ADMIN — PROPOFOL 40 MCG/KG/MIN: 10 INJECTION, EMULSION INTRAVENOUS at 08:27

## 2020-04-20 RX ADMIN — POTASSIUM CHLORIDE 100 ML/HR: 149 INJECTION, SOLUTION, CONCENTRATE INTRAVENOUS at 10:09

## 2020-04-20 RX ADMIN — DEXMEDETOMIDINE HYDROCHLORIDE 0.6 MCG/KG/HR: 4 INJECTION INTRAVENOUS at 20:54

## 2020-04-20 RX ADMIN — DEXMEDETOMIDINE HYDROCHLORIDE 0.6 MCG/KG/HR: 100 INJECTION, SOLUTION, CONCENTRATE INTRAVENOUS at 11:57

## 2020-04-20 RX ADMIN — TAZOBACTAM SODIUM AND PIPERACILLIN SODIUM 3.38 G: 375; 3 INJECTION, SOLUTION INTRAVENOUS at 04:03

## 2020-04-20 RX ADMIN — FENTANYL CITRATE 50 MCG: 0.05 INJECTION, SOLUTION INTRAMUSCULAR; INTRAVENOUS at 04:03

## 2020-04-20 RX ADMIN — PANTOPRAZOLE SODIUM 40 MG: 40 INJECTION, POWDER, FOR SOLUTION INTRAVENOUS at 06:30

## 2020-04-20 RX ADMIN — PROPOFOL 35 MCG/KG/MIN: 10 INJECTION, EMULSION INTRAVENOUS at 23:12

## 2020-04-20 RX ADMIN — PROPOFOL 35 MCG/KG/MIN: 10 INJECTION, EMULSION INTRAVENOUS at 19:21

## 2020-04-20 RX ADMIN — SUCRALFATE 1 G: 1 SUSPENSION ORAL at 10:38

## 2020-04-20 RX ADMIN — PROPOFOL 35 MCG/KG/MIN: 10 INJECTION, EMULSION INTRAVENOUS at 13:44

## 2020-04-20 RX ADMIN — CHLORHEXIDINE GLUCONATE 15 ML: 1.2 RINSE ORAL at 21:05

## 2020-04-20 RX ADMIN — TAZOBACTAM SODIUM AND PIPERACILLIN SODIUM 3.38 G: 375; 3 INJECTION, SOLUTION INTRAVENOUS at 10:38

## 2020-04-20 RX ADMIN — POTASSIUM CHLORIDE 100 ML/HR: 149 INJECTION, SOLUTION, CONCENTRATE INTRAVENOUS at 20:54

## 2020-04-20 RX ADMIN — CLOMIPRAMINE HYDROCHLORIDE 150 MG: 50 CAPSULE ORAL at 21:04

## 2020-04-20 RX ADMIN — SUCRALFATE 1 G: 1 SUSPENSION ORAL at 06:30

## 2020-04-20 RX ADMIN — DEXMEDETOMIDINE HYDROCHLORIDE 0.9 MCG/KG/HR: 100 INJECTION, SOLUTION, CONCENTRATE INTRAVENOUS at 04:06

## 2020-04-20 RX ADMIN — PANTOPRAZOLE SODIUM 40 MG: 40 INJECTION, POWDER, FOR SOLUTION INTRAVENOUS at 19:11

## 2020-04-20 RX ADMIN — TAZOBACTAM SODIUM AND PIPERACILLIN SODIUM 3.38 G: 375; 3 INJECTION, SOLUTION INTRAVENOUS at 19:11

## 2020-04-21 ENCOUNTER — APPOINTMENT (OUTPATIENT)
Dept: GENERAL RADIOLOGY | Facility: HOSPITAL | Age: 65
End: 2020-04-21

## 2020-04-21 LAB
ALBUMIN SERPL-MCNC: 2.3 G/DL (ref 3.5–5.2)
ALBUMIN/GLOB SERPL: 0.7 G/DL
ALP SERPL-CCNC: 96 U/L (ref 39–117)
ALT SERPL W P-5'-P-CCNC: 17 U/L (ref 1–41)
ANION GAP SERPL CALCULATED.3IONS-SCNC: 10.1 MMOL/L (ref 5–15)
AST SERPL-CCNC: 21 U/L (ref 1–40)
BACTERIA SPEC AEROBE CULT: NORMAL
BACTERIA SPEC AEROBE CULT: NORMAL
BASOPHILS # BLD AUTO: 0.04 10*3/MM3 (ref 0–0.2)
BASOPHILS NFR BLD AUTO: 0.7 % (ref 0–1.5)
BILIRUB SERPL-MCNC: 0.4 MG/DL (ref 0.2–1.2)
BUN BLD-MCNC: 12 MG/DL (ref 8–23)
BUN/CREAT SERPL: 17.4 (ref 7–25)
CALCIUM SPEC-SCNC: 8.3 MG/DL (ref 8.6–10.5)
CHLORIDE SERPL-SCNC: 99 MMOL/L (ref 98–107)
CK SERPL-CCNC: 36 U/L (ref 20–200)
CO2 SERPL-SCNC: 23.9 MMOL/L (ref 22–29)
CREAT BLD-MCNC: 0.69 MG/DL (ref 0.76–1.27)
CRP SERPL-MCNC: 7.17 MG/DL (ref 0–0.5)
DEPRECATED RDW RBC AUTO: 40.4 FL (ref 37–54)
EOSINOPHIL # BLD AUTO: 0.37 10*3/MM3 (ref 0–0.4)
EOSINOPHIL NFR BLD AUTO: 6.5 % (ref 0.3–6.2)
ERYTHROCYTE [DISTWIDTH] IN BLOOD BY AUTOMATED COUNT: 13.8 % (ref 12.3–15.4)
FERRITIN SERPL-MCNC: 184 NG/ML (ref 30–400)
GFR SERPL CREATININE-BSD FRML MDRD: 114 ML/MIN/1.73
GLOBULIN UR ELPH-MCNC: 3.4 GM/DL
GLUCOSE BLD-MCNC: 137 MG/DL (ref 65–99)
GLUCOSE BLDC GLUCOMTR-MCNC: 118 MG/DL (ref 70–130)
GLUCOSE BLDC GLUCOMTR-MCNC: 118 MG/DL (ref 70–130)
GLUCOSE BLDC GLUCOMTR-MCNC: 167 MG/DL (ref 70–130)
HCT VFR BLD AUTO: 30.6 % (ref 37.5–51)
HGB BLD-MCNC: 10.1 G/DL (ref 13–17.7)
IMM GRANULOCYTES # BLD AUTO: 0.08 10*3/MM3 (ref 0–0.05)
IMM GRANULOCYTES NFR BLD AUTO: 1.4 % (ref 0–0.5)
LYMPHOCYTES # BLD AUTO: 0.85 10*3/MM3 (ref 0.7–3.1)
LYMPHOCYTES NFR BLD AUTO: 15 % (ref 19.6–45.3)
MCH RBC QN AUTO: 27.3 PG (ref 26.6–33)
MCHC RBC AUTO-ENTMCNC: 33 G/DL (ref 31.5–35.7)
MCV RBC AUTO: 82.7 FL (ref 79–97)
MONOCYTES # BLD AUTO: 0.52 10*3/MM3 (ref 0.1–0.9)
MONOCYTES NFR BLD AUTO: 9.2 % (ref 5–12)
NEUTROPHILS # BLD AUTO: 3.79 10*3/MM3 (ref 1.7–7)
NEUTROPHILS NFR BLD AUTO: 67.2 % (ref 42.7–76)
NRBC BLD AUTO-RTO: 0 /100 WBC (ref 0–0.2)
PLATELET # BLD AUTO: 180 10*3/MM3 (ref 140–450)
PMV BLD AUTO: 10.5 FL (ref 6–12)
POTASSIUM BLD-SCNC: 3.8 MMOL/L (ref 3.5–5.2)
PROCALCITONIN SERPL-MCNC: 0.42 NG/ML (ref 0.1–0.25)
PROT SERPL-MCNC: 5.7 G/DL (ref 6–8.5)
RBC # BLD AUTO: 3.7 10*6/MM3 (ref 4.14–5.8)
SODIUM BLD-SCNC: 133 MMOL/L (ref 136–145)
WBC NRBC COR # BLD: 5.65 10*3/MM3 (ref 3.4–10.8)

## 2020-04-21 PROCEDURE — 71045 X-RAY EXAM CHEST 1 VIEW: CPT

## 2020-04-21 PROCEDURE — 94799 UNLISTED PULMONARY SVC/PX: CPT

## 2020-04-21 PROCEDURE — 82728 ASSAY OF FERRITIN: CPT | Performed by: INTERNAL MEDICINE

## 2020-04-21 PROCEDURE — 99232 SBSQ HOSP IP/OBS MODERATE 35: CPT | Performed by: NURSE PRACTITIONER

## 2020-04-21 PROCEDURE — 86140 C-REACTIVE PROTEIN: CPT | Performed by: INTERNAL MEDICINE

## 2020-04-21 PROCEDURE — 85025 COMPLETE CBC W/AUTO DIFF WBC: CPT | Performed by: INTERNAL MEDICINE

## 2020-04-21 PROCEDURE — 82962 GLUCOSE BLOOD TEST: CPT

## 2020-04-21 PROCEDURE — 87070 CULTURE OTHR SPECIMN AEROBIC: CPT | Performed by: INTERNAL MEDICINE

## 2020-04-21 PROCEDURE — 94003 VENT MGMT INPAT SUBQ DAY: CPT

## 2020-04-21 PROCEDURE — 94770: CPT

## 2020-04-21 PROCEDURE — 84145 PROCALCITONIN (PCT): CPT | Performed by: INTERNAL MEDICINE

## 2020-04-21 PROCEDURE — 82550 ASSAY OF CK (CPK): CPT | Performed by: INTERNAL MEDICINE

## 2020-04-21 PROCEDURE — 87205 SMEAR GRAM STAIN: CPT | Performed by: INTERNAL MEDICINE

## 2020-04-21 PROCEDURE — 25010000002 FENTANYL CITRATE (PF) 100 MCG/2ML SOLUTION: Performed by: INTERNAL MEDICINE

## 2020-04-21 PROCEDURE — 25010000002 PROPOFOL 10 MG/ML EMULSION: Performed by: INTERNAL MEDICINE

## 2020-04-21 PROCEDURE — 99232 SBSQ HOSP IP/OBS MODERATE 35: CPT | Performed by: INTERNAL MEDICINE

## 2020-04-21 PROCEDURE — 25010000002 PIPERACILLIN SOD-TAZOBACTAM PER 1 G: Performed by: INTERNAL MEDICINE

## 2020-04-21 PROCEDURE — 63710000001 INSULIN LISPRO (HUMAN) PER 5 UNITS: Performed by: INTERNAL MEDICINE

## 2020-04-21 PROCEDURE — 80053 COMPREHEN METABOLIC PANEL: CPT | Performed by: INTERNAL MEDICINE

## 2020-04-21 RX ORDER — LANSOPRAZOLE
30 KIT
Status: DISCONTINUED | OUTPATIENT
Start: 2020-04-21 | End: 2020-04-28

## 2020-04-21 RX ADMIN — CHLORHEXIDINE GLUCONATE 15 ML: 1.2 RINSE ORAL at 10:24

## 2020-04-21 RX ADMIN — CLOMIPRAMINE HYDROCHLORIDE 150 MG: 50 CAPSULE ORAL at 20:27

## 2020-04-21 RX ADMIN — TAZOBACTAM SODIUM AND PIPERACILLIN SODIUM 3.38 G: 375; 3 INJECTION, SOLUTION INTRAVENOUS at 15:22

## 2020-04-21 RX ADMIN — LANSOPRAZOLE 30 MG: KIT at 17:24

## 2020-04-21 RX ADMIN — POTASSIUM CHLORIDE 100 ML/HR: 149 INJECTION, SOLUTION, CONCENTRATE INTRAVENOUS at 16:47

## 2020-04-21 RX ADMIN — FENTANYL CITRATE 50 MCG: 0.05 INJECTION, SOLUTION INTRAMUSCULAR; INTRAVENOUS at 16:38

## 2020-04-21 RX ADMIN — DEXMEDETOMIDINE HYDROCHLORIDE 0.7 MCG/KG/HR: 4 INJECTION INTRAVENOUS at 18:44

## 2020-04-21 RX ADMIN — TAZOBACTAM SODIUM AND PIPERACILLIN SODIUM 3.38 G: 375; 3 INJECTION, SOLUTION INTRAVENOUS at 03:38

## 2020-04-21 RX ADMIN — FENTANYL CITRATE 50 MCG: 0.05 INJECTION, SOLUTION INTRAMUSCULAR; INTRAVENOUS at 20:28

## 2020-04-21 RX ADMIN — PANTOPRAZOLE SODIUM 40 MG: 40 INJECTION, POWDER, FOR SOLUTION INTRAVENOUS at 06:34

## 2020-04-21 RX ADMIN — INSULIN LISPRO 3 UNITS: 100 INJECTION, SOLUTION INTRAVENOUS; SUBCUTANEOUS at 06:18

## 2020-04-21 RX ADMIN — PROPOFOL 50 MCG/KG/MIN: 10 INJECTION, EMULSION INTRAVENOUS at 16:46

## 2020-04-21 RX ADMIN — CHLORHEXIDINE GLUCONATE 15 ML: 1.2 RINSE ORAL at 20:29

## 2020-04-21 RX ADMIN — DEXMEDETOMIDINE HYDROCHLORIDE 0.6 MCG/KG/HR: 4 INJECTION INTRAVENOUS at 06:57

## 2020-04-21 RX ADMIN — POTASSIUM CHLORIDE 100 ML/HR: 149 INJECTION, SOLUTION, CONCENTRATE INTRAVENOUS at 06:10

## 2020-04-21 RX ADMIN — DEXMEDETOMIDINE HYDROCHLORIDE 0.6 MCG/KG/HR: 4 INJECTION INTRAVENOUS at 00:18

## 2020-04-21 RX ADMIN — DEXMEDETOMIDINE HYDROCHLORIDE 0.7 MCG/KG/HR: 4 INJECTION INTRAVENOUS at 21:35

## 2020-04-21 RX ADMIN — PROPOFOL 30 MCG/KG/MIN: 10 INJECTION, EMULSION INTRAVENOUS at 20:28

## 2020-04-21 RX ADMIN — PROPOFOL 30 MCG/KG/MIN: 10 INJECTION, EMULSION INTRAVENOUS at 11:56

## 2020-04-21 RX ADMIN — DEXMEDETOMIDINE HYDROCHLORIDE 0.6 MCG/KG/HR: 4 INJECTION INTRAVENOUS at 04:01

## 2020-04-21 RX ADMIN — DEXMEDETOMIDINE HYDROCHLORIDE 0.6 MCG/KG/HR: 4 INJECTION INTRAVENOUS at 12:01

## 2020-04-21 RX ADMIN — FENTANYL CITRATE 50 MCG: 0.05 INJECTION, SOLUTION INTRAMUSCULAR; INTRAVENOUS at 23:40

## 2020-04-21 RX ADMIN — PROPOFOL 30 MCG/KG/MIN: 10 INJECTION, EMULSION INTRAVENOUS at 04:00

## 2020-04-21 RX ADMIN — FENTANYL CITRATE 50 MCG: 0.05 INJECTION, SOLUTION INTRAMUSCULAR; INTRAVENOUS at 22:18

## 2020-04-21 RX ADMIN — PROPOFOL 30 MCG/KG/MIN: 10 INJECTION, EMULSION INTRAVENOUS at 06:57

## 2020-04-21 RX ADMIN — DEXMEDETOMIDINE HYDROCHLORIDE 0.6 MCG/KG/HR: 4 INJECTION INTRAVENOUS at 16:13

## 2020-04-22 LAB
ALBUMIN SERPL-MCNC: 2.3 G/DL (ref 3.5–5.2)
ALBUMIN/GLOB SERPL: 0.7 G/DL
ALP SERPL-CCNC: 91 U/L (ref 39–117)
ALT SERPL W P-5'-P-CCNC: 25 U/L (ref 1–41)
ANION GAP SERPL CALCULATED.3IONS-SCNC: 8.2 MMOL/L (ref 5–15)
AST SERPL-CCNC: 32 U/L (ref 1–40)
BASOPHILS # BLD AUTO: 0.04 10*3/MM3 (ref 0–0.2)
BASOPHILS NFR BLD AUTO: 0.6 % (ref 0–1.5)
BILIRUB SERPL-MCNC: 0.4 MG/DL (ref 0.2–1.2)
BUN BLD-MCNC: 12 MG/DL (ref 8–23)
BUN/CREAT SERPL: 15.4 (ref 7–25)
CALCIUM SPEC-SCNC: 8.4 MG/DL (ref 8.6–10.5)
CHLORIDE SERPL-SCNC: 101 MMOL/L (ref 98–107)
CK SERPL-CCNC: 47 U/L (ref 20–200)
CO2 SERPL-SCNC: 26.8 MMOL/L (ref 22–29)
CREAT BLD-MCNC: 0.78 MG/DL (ref 0.76–1.27)
CRP SERPL-MCNC: 4.27 MG/DL (ref 0–0.5)
DEPRECATED RDW RBC AUTO: 41.8 FL (ref 37–54)
EOSINOPHIL # BLD AUTO: 0.3 10*3/MM3 (ref 0–0.4)
EOSINOPHIL NFR BLD AUTO: 4.2 % (ref 0.3–6.2)
ERYTHROCYTE [DISTWIDTH] IN BLOOD BY AUTOMATED COUNT: 13.8 % (ref 12.3–15.4)
FERRITIN SERPL-MCNC: 189 NG/ML (ref 30–400)
GFR SERPL CREATININE-BSD FRML MDRD: 99 ML/MIN/1.73
GLOBULIN UR ELPH-MCNC: 3.3 GM/DL
GLUCOSE BLD-MCNC: 147 MG/DL (ref 65–99)
GLUCOSE BLDC GLUCOMTR-MCNC: 115 MG/DL (ref 70–130)
GLUCOSE BLDC GLUCOMTR-MCNC: 130 MG/DL (ref 70–130)
GLUCOSE BLDC GLUCOMTR-MCNC: 132 MG/DL (ref 70–130)
GLUCOSE BLDC GLUCOMTR-MCNC: 152 MG/DL (ref 70–130)
GLUCOSE BLDC GLUCOMTR-MCNC: 158 MG/DL (ref 70–130)
HCT VFR BLD AUTO: 30.8 % (ref 37.5–51)
HGB BLD-MCNC: 10.1 G/DL (ref 13–17.7)
IMM GRANULOCYTES # BLD AUTO: 0.11 10*3/MM3 (ref 0–0.05)
IMM GRANULOCYTES NFR BLD AUTO: 1.5 % (ref 0–0.5)
IRON 24H UR-MRATE: 28 MCG/DL (ref 59–158)
IRON SATN MFR SERPL: 12 % (ref 20–50)
LYMPHOCYTES # BLD AUTO: 1.09 10*3/MM3 (ref 0.7–3.1)
LYMPHOCYTES NFR BLD AUTO: 15.2 % (ref 19.6–45.3)
MCH RBC QN AUTO: 27.4 PG (ref 26.6–33)
MCHC RBC AUTO-ENTMCNC: 32.8 G/DL (ref 31.5–35.7)
MCV RBC AUTO: 83.7 FL (ref 79–97)
MONOCYTES # BLD AUTO: 0.5 10*3/MM3 (ref 0.1–0.9)
MONOCYTES NFR BLD AUTO: 7 % (ref 5–12)
NEUTROPHILS # BLD AUTO: 5.14 10*3/MM3 (ref 1.7–7)
NEUTROPHILS NFR BLD AUTO: 71.5 % (ref 42.7–76)
NRBC BLD AUTO-RTO: 0.1 /100 WBC (ref 0–0.2)
PLATELET # BLD AUTO: 199 10*3/MM3 (ref 140–450)
PMV BLD AUTO: 10.6 FL (ref 6–12)
POTASSIUM BLD-SCNC: 3.9 MMOL/L (ref 3.5–5.2)
PROT SERPL-MCNC: 5.6 G/DL (ref 6–8.5)
RBC # BLD AUTO: 3.68 10*6/MM3 (ref 4.14–5.8)
SODIUM BLD-SCNC: 136 MMOL/L (ref 136–145)
TIBC SERPL-MCNC: 228 MCG/DL (ref 298–536)
TRANSFERRIN SERPL-MCNC: 153 MG/DL (ref 200–360)
VIT B12 BLD-MCNC: 541 PG/ML (ref 211–946)
WBC NRBC COR # BLD: 7.18 10*3/MM3 (ref 3.4–10.8)

## 2020-04-22 PROCEDURE — 82550 ASSAY OF CK (CPK): CPT | Performed by: INTERNAL MEDICINE

## 2020-04-22 PROCEDURE — 25010000002 FUROSEMIDE PER 20 MG: Performed by: INTERNAL MEDICINE

## 2020-04-22 PROCEDURE — 25010000002 PROPOFOL 10 MG/ML EMULSION: Performed by: INTERNAL MEDICINE

## 2020-04-22 PROCEDURE — 84466 ASSAY OF TRANSFERRIN: CPT | Performed by: INTERNAL MEDICINE

## 2020-04-22 PROCEDURE — 94799 UNLISTED PULMONARY SVC/PX: CPT

## 2020-04-22 PROCEDURE — 99232 SBSQ HOSP IP/OBS MODERATE 35: CPT | Performed by: INTERNAL MEDICINE

## 2020-04-22 PROCEDURE — 82728 ASSAY OF FERRITIN: CPT | Performed by: INTERNAL MEDICINE

## 2020-04-22 PROCEDURE — 83540 ASSAY OF IRON: CPT | Performed by: INTERNAL MEDICINE

## 2020-04-22 PROCEDURE — 85014 HEMATOCRIT: CPT | Performed by: INTERNAL MEDICINE

## 2020-04-22 PROCEDURE — 99231 SBSQ HOSP IP/OBS SF/LOW 25: CPT | Performed by: NURSE PRACTITIONER

## 2020-04-22 PROCEDURE — 25010000002 FENTANYL CITRATE (PF) 100 MCG/2ML SOLUTION: Performed by: INTERNAL MEDICINE

## 2020-04-22 PROCEDURE — 82607 VITAMIN B-12: CPT | Performed by: INTERNAL MEDICINE

## 2020-04-22 PROCEDURE — 85025 COMPLETE CBC W/AUTO DIFF WBC: CPT | Performed by: INTERNAL MEDICINE

## 2020-04-22 PROCEDURE — 25010000002 PIPERACILLIN SOD-TAZOBACTAM PER 1 G: Performed by: INTERNAL MEDICINE

## 2020-04-22 PROCEDURE — 63710000001 INSULIN LISPRO (HUMAN) PER 5 UNITS: Performed by: INTERNAL MEDICINE

## 2020-04-22 PROCEDURE — 80053 COMPREHEN METABOLIC PANEL: CPT | Performed by: INTERNAL MEDICINE

## 2020-04-22 PROCEDURE — 86140 C-REACTIVE PROTEIN: CPT | Performed by: INTERNAL MEDICINE

## 2020-04-22 PROCEDURE — 82747 ASSAY OF FOLIC ACID RBC: CPT | Performed by: INTERNAL MEDICINE

## 2020-04-22 PROCEDURE — 82962 GLUCOSE BLOOD TEST: CPT

## 2020-04-22 PROCEDURE — 94003 VENT MGMT INPAT SUBQ DAY: CPT

## 2020-04-22 PROCEDURE — 94770: CPT

## 2020-04-22 RX ORDER — FUROSEMIDE 10 MG/ML
40 INJECTION INTRAMUSCULAR; INTRAVENOUS ONCE
Status: COMPLETED | OUTPATIENT
Start: 2020-04-22 | End: 2020-04-22

## 2020-04-22 RX ADMIN — INSULIN LISPRO 3 UNITS: 100 INJECTION, SOLUTION INTRAVENOUS; SUBCUTANEOUS at 11:59

## 2020-04-22 RX ADMIN — PROPOFOL 35 MCG/KG/MIN: 10 INJECTION, EMULSION INTRAVENOUS at 11:00

## 2020-04-22 RX ADMIN — FUROSEMIDE 40 MG: 10 INJECTION, SOLUTION INTRAMUSCULAR; INTRAVENOUS at 15:02

## 2020-04-22 RX ADMIN — FENTANYL CITRATE 50 MCG: 0.05 INJECTION, SOLUTION INTRAMUSCULAR; INTRAVENOUS at 06:21

## 2020-04-22 RX ADMIN — INSULIN LISPRO 3 UNITS: 100 INJECTION, SOLUTION INTRAVENOUS; SUBCUTANEOUS at 06:21

## 2020-04-22 RX ADMIN — TAZOBACTAM SODIUM AND PIPERACILLIN SODIUM 3.38 G: 375; 3 INJECTION, SOLUTION INTRAVENOUS at 00:47

## 2020-04-22 RX ADMIN — DEXMEDETOMIDINE HYDROCHLORIDE 0.8 MCG/KG/HR: 4 INJECTION INTRAVENOUS at 04:08

## 2020-04-22 RX ADMIN — FENTANYL CITRATE 50 MCG: 0.05 INJECTION, SOLUTION INTRAMUSCULAR; INTRAVENOUS at 22:05

## 2020-04-22 RX ADMIN — DEXMEDETOMIDINE HYDROCHLORIDE 0.4 MCG/KG/HR: 4 INJECTION INTRAVENOUS at 21:34

## 2020-04-22 RX ADMIN — FENTANYL CITRATE 50 MCG: 0.05 INJECTION, SOLUTION INTRAMUSCULAR; INTRAVENOUS at 08:31

## 2020-04-22 RX ADMIN — LANSOPRAZOLE 30 MG: KIT at 18:11

## 2020-04-22 RX ADMIN — CLOMIPRAMINE HYDROCHLORIDE 150 MG: 50 CAPSULE ORAL at 21:34

## 2020-04-22 RX ADMIN — FENTANYL CITRATE 50 MCG: 0.05 INJECTION, SOLUTION INTRAMUSCULAR; INTRAVENOUS at 07:22

## 2020-04-22 RX ADMIN — DEXMEDETOMIDINE HYDROCHLORIDE 0.8 MCG/KG/HR: 4 INJECTION INTRAVENOUS at 06:43

## 2020-04-22 RX ADMIN — PROPOFOL 25 MCG/KG/MIN: 10 INJECTION, EMULSION INTRAVENOUS at 02:52

## 2020-04-22 RX ADMIN — POTASSIUM CHLORIDE 100 ML/HR: 149 INJECTION, SOLUTION, CONCENTRATE INTRAVENOUS at 13:25

## 2020-04-22 RX ADMIN — CHLORHEXIDINE GLUCONATE 15 ML: 1.2 RINSE ORAL at 08:32

## 2020-04-22 RX ADMIN — DEXMEDETOMIDINE HYDROCHLORIDE 0.8 MCG/KG/HR: 4 INJECTION INTRAVENOUS at 01:13

## 2020-04-22 RX ADMIN — POTASSIUM CHLORIDE 100 ML/HR: 149 INJECTION, SOLUTION, CONCENTRATE INTRAVENOUS at 02:52

## 2020-04-22 RX ADMIN — PROPOFOL 35 MCG/KG/MIN: 10 INJECTION, EMULSION INTRAVENOUS at 15:08

## 2020-04-22 RX ADMIN — FENTANYL CITRATE 50 MCG: 0.05 INJECTION, SOLUTION INTRAMUSCULAR; INTRAVENOUS at 17:26

## 2020-04-22 RX ADMIN — FENTANYL CITRATE 50 MCG: 0.05 INJECTION, SOLUTION INTRAMUSCULAR; INTRAVENOUS at 15:03

## 2020-04-22 RX ADMIN — FENTANYL CITRATE 50 MCG: 0.05 INJECTION, SOLUTION INTRAMUSCULAR; INTRAVENOUS at 20:10

## 2020-04-22 RX ADMIN — CHLORHEXIDINE GLUCONATE 15 ML: 1.2 RINSE ORAL at 21:13

## 2020-04-22 RX ADMIN — TAZOBACTAM SODIUM AND PIPERACILLIN SODIUM 3.38 G: 375; 3 INJECTION, SOLUTION INTRAVENOUS at 15:03

## 2020-04-22 RX ADMIN — FENTANYL CITRATE 50 MCG: 0.05 INJECTION, SOLUTION INTRAMUSCULAR; INTRAVENOUS at 16:39

## 2020-04-22 RX ADMIN — TAZOBACTAM SODIUM AND PIPERACILLIN SODIUM 3.38 G: 375; 3 INJECTION, SOLUTION INTRAVENOUS at 23:42

## 2020-04-22 RX ADMIN — FENTANYL CITRATE 50 MCG: 0.05 INJECTION, SOLUTION INTRAMUSCULAR; INTRAVENOUS at 23:35

## 2020-04-22 RX ADMIN — DEXMEDETOMIDINE HYDROCHLORIDE 0.4 MCG/KG/HR: 4 INJECTION INTRAVENOUS at 15:14

## 2020-04-22 RX ADMIN — TAZOBACTAM SODIUM AND PIPERACILLIN SODIUM 3.38 G: 375; 3 INJECTION, SOLUTION INTRAVENOUS at 06:21

## 2020-04-22 RX ADMIN — PROPOFOL 45 MCG/KG/MIN: 10 INJECTION, EMULSION INTRAVENOUS at 21:13

## 2020-04-22 RX ADMIN — DEXMEDETOMIDINE HYDROCHLORIDE 0.6 MCG/KG/HR: 4 INJECTION INTRAVENOUS at 11:00

## 2020-04-22 RX ADMIN — FENTANYL CITRATE 50 MCG: 0.05 INJECTION, SOLUTION INTRAMUSCULAR; INTRAVENOUS at 03:57

## 2020-04-22 RX ADMIN — LANSOPRAZOLE 30 MG: KIT at 06:31

## 2020-04-23 ENCOUNTER — APPOINTMENT (OUTPATIENT)
Dept: GENERAL RADIOLOGY | Facility: HOSPITAL | Age: 65
End: 2020-04-23

## 2020-04-23 ENCOUNTER — APPOINTMENT (OUTPATIENT)
Dept: CT IMAGING | Facility: HOSPITAL | Age: 65
End: 2020-04-23

## 2020-04-23 ENCOUNTER — APPOINTMENT (OUTPATIENT)
Dept: CARDIOLOGY | Facility: HOSPITAL | Age: 65
End: 2020-04-23

## 2020-04-23 LAB
ALBUMIN SERPL-MCNC: 2.4 G/DL (ref 3.5–5.2)
ALBUMIN/GLOB SERPL: 0.8 G/DL
ALP SERPL-CCNC: 87 U/L (ref 39–117)
ALT SERPL W P-5'-P-CCNC: 33 U/L (ref 1–41)
ANION GAP SERPL CALCULATED.3IONS-SCNC: 10.5 MMOL/L (ref 5–15)
AORTIC DIMENSIONLESS INDEX: 0.7 (DI)
ARTERIAL PATENCY WRIST A: POSITIVE
AST SERPL-CCNC: 34 U/L (ref 1–40)
ATMOSPHERIC PRESS: 740.4 MMHG
BACTERIA SPEC RESP CULT: NO GROWTH
BASE EXCESS BLDA CALC-SCNC: 3.5 MMOL/L (ref 0–2)
BASOPHILS # BLD AUTO: 0.05 10*3/MM3 (ref 0–0.2)
BASOPHILS NFR BLD AUTO: 0.7 % (ref 0–1.5)
BDY SITE: ABNORMAL
BH CV ECHO MEAS - AO MAX PG (FULL): 3.8 MMHG
BH CV ECHO MEAS - AO MAX PG: 6 MMHG
BH CV ECHO MEAS - AO MEAN PG (FULL): 2 MMHG
BH CV ECHO MEAS - AO MEAN PG: 3 MMHG
BH CV ECHO MEAS - AO V2 MAX: 122 CM/SEC
BH CV ECHO MEAS - AO V2 MEAN: 78.8 CM/SEC
BH CV ECHO MEAS - AO V2 VTI: 17.2 CM
BH CV ECHO MEAS - AVA(I,A): 3 CM^2
BH CV ECHO MEAS - AVA(I,D): 3 CM^2
BH CV ECHO MEAS - AVA(V,A): 2.7 CM^2
BH CV ECHO MEAS - AVA(V,D): 2.7 CM^2
BH CV ECHO MEAS - BSA(HAYCOCK): 2.2 M^2
BH CV ECHO MEAS - BSA: 2.2 M^2
BH CV ECHO MEAS - BZI_BMI: 28.2 KILOGRAMS/M^2
BH CV ECHO MEAS - BZI_METRIC_HEIGHT: 182.9 CM
BH CV ECHO MEAS - BZI_METRIC_WEIGHT: 94.3 KG
BH CV ECHO MEAS - EDV(CUBED): 54.9 ML
BH CV ECHO MEAS - EDV(MOD-SP2): 106 ML
BH CV ECHO MEAS - EDV(MOD-SP4): 114 ML
BH CV ECHO MEAS - EDV(TEICH): 62 ML
BH CV ECHO MEAS - EF(CUBED): 64.1 %
BH CV ECHO MEAS - EF(MOD-BP): 52 %
BH CV ECHO MEAS - EF(MOD-SP2): 51.9 %
BH CV ECHO MEAS - EF(MOD-SP4): 52.6 %
BH CV ECHO MEAS - EF(TEICH): 56.4 %
BH CV ECHO MEAS - ESV(CUBED): 19.7 ML
BH CV ECHO MEAS - ESV(MOD-SP2): 51 ML
BH CV ECHO MEAS - ESV(MOD-SP4): 54 ML
BH CV ECHO MEAS - ESV(TEICH): 27 ML
BH CV ECHO MEAS - FS: 28.9 %
BH CV ECHO MEAS - IVS/LVPW: 1
BH CV ECHO MEAS - IVSD: 1.2 CM
BH CV ECHO MEAS - LAT PEAK E' VEL: 7.9 CM/SEC
BH CV ECHO MEAS - LV DIASTOLIC VOL/BSA (35-75): 52.6 ML/M^2
BH CV ECHO MEAS - LV MASS(C)D: 153.2 GRAMS
BH CV ECHO MEAS - LV MASS(C)DI: 70.7 GRAMS/M^2
BH CV ECHO MEAS - LV MAX PG: 2.2 MMHG
BH CV ECHO MEAS - LV MEAN PG: 1 MMHG
BH CV ECHO MEAS - LV SYSTOLIC VOL/BSA (12-30): 24.9 ML/M^2
BH CV ECHO MEAS - LV V1 MAX: 73.9 CM/SEC
BH CV ECHO MEAS - LV V1 MEAN: 49.2 CM/SEC
BH CV ECHO MEAS - LV V1 VTI: 11.5 CM
BH CV ECHO MEAS - LVIDD: 3.8 CM
BH CV ECHO MEAS - LVIDS: 2.7 CM
BH CV ECHO MEAS - LVLD AP2: 8.4 CM
BH CV ECHO MEAS - LVLD AP4: 7.9 CM
BH CV ECHO MEAS - LVLS AP2: 7.5 CM
BH CV ECHO MEAS - LVLS AP4: 7 CM
BH CV ECHO MEAS - LVOT AREA (M): 4.5 CM^2
BH CV ECHO MEAS - LVOT AREA: 4.5 CM^2
BH CV ECHO MEAS - LVOT DIAM: 2.4 CM
BH CV ECHO MEAS - LVPWD: 1.2 CM
BH CV ECHO MEAS - MED PEAK E' VEL: 6.09 CM/SEC
BH CV ECHO MEAS - MV A MAX VEL: 68.4 CM/SEC
BH CV ECHO MEAS - MV DEC SLOPE: 370.5 CM/SEC^2
BH CV ECHO MEAS - MV DEC TIME: 261 SEC
BH CV ECHO MEAS - MV E MAX VEL: 53.8 CM/SEC
BH CV ECHO MEAS - MV E/A: 0.79
BH CV ECHO MEAS - MV MAX PG: 3 MMHG
BH CV ECHO MEAS - MV MEAN PG: 2 MMHG
BH CV ECHO MEAS - MV P1/2T MAX VEL: 103 CM/SEC
BH CV ECHO MEAS - MV P1/2T: 81.4 MSEC
BH CV ECHO MEAS - MV V2 MAX: 87.3 CM/SEC
BH CV ECHO MEAS - MV V2 MEAN: 68.6 CM/SEC
BH CV ECHO MEAS - MV V2 VTI: 17.2 CM
BH CV ECHO MEAS - MVA P1/2T LCG: 2.1 CM^2
BH CV ECHO MEAS - MVA(P1/2T): 2.7 CM^2
BH CV ECHO MEAS - MVA(VTI): 3 CM^2
BH CV ECHO MEAS - PA ACC TIME: 0.09 SEC
BH CV ECHO MEAS - PA MAX PG (FULL): 0.3 MMHG
BH CV ECHO MEAS - PA MAX PG: 2.2 MMHG
BH CV ECHO MEAS - PA PR(ACCEL): 40.8 MMHG
BH CV ECHO MEAS - PA V2 MAX: 74.7 CM/SEC
BH CV ECHO MEAS - PULM A REVS DUR: 0.09 SEC
BH CV ECHO MEAS - PULM A REVS VEL: 29.6 CM/SEC
BH CV ECHO MEAS - PULM DIAS VEL: 30 CM/SEC
BH CV ECHO MEAS - PULM S/D: 1.2
BH CV ECHO MEAS - PULM SYS VEL: 37.3 CM/SEC
BH CV ECHO MEAS - RAP SYSTOLE: 3 MMHG
BH CV ECHO MEAS - RV MAX PG: 1.9 MMHG
BH CV ECHO MEAS - RV MEAN PG: 1 MMHG
BH CV ECHO MEAS - RV V1 MAX: 69.5 CM/SEC
BH CV ECHO MEAS - RV V1 MEAN: 47.4 CM/SEC
BH CV ECHO MEAS - RV V1 VTI: 11.6 CM
BH CV ECHO MEAS - RVSP: 20 MMHG
BH CV ECHO MEAS - SI(CUBED): 16.2 ML/M^2
BH CV ECHO MEAS - SI(LVOT): 24 ML/M^2
BH CV ECHO MEAS - SI(MOD-SP2): 25.4 ML/M^2
BH CV ECHO MEAS - SI(MOD-SP4): 27.7 ML/M^2
BH CV ECHO MEAS - SI(TEICH): 16.1 ML/M^2
BH CV ECHO MEAS - SV(CUBED): 35.2 ML
BH CV ECHO MEAS - SV(LVOT): 52 ML
BH CV ECHO MEAS - SV(MOD-SP2): 55 ML
BH CV ECHO MEAS - SV(MOD-SP4): 60 ML
BH CV ECHO MEAS - SV(TEICH): 34.9 ML
BH CV ECHO MEAS - TR MAX PG: 17 MMHG
BH CV ECHO MEAS - TR MAX VEL: 227 CM/SEC
BH CV ECHO MEASUREMENTS AVERAGE E/E' RATIO: 7.69
BH CV XLRA - TDI S': 11 CM/SEC
BILIRUB SERPL-MCNC: 0.5 MG/DL (ref 0.2–1.2)
BUN BLD-MCNC: 14 MG/DL (ref 8–23)
BUN/CREAT SERPL: 17.5 (ref 7–25)
CALCIUM SPEC-SCNC: 8.1 MG/DL (ref 8.6–10.5)
CHLORIDE SERPL-SCNC: 100 MMOL/L (ref 98–107)
CK SERPL-CCNC: 48 U/L (ref 20–200)
CO2 SERPL-SCNC: 27.5 MMOL/L (ref 22–29)
CREAT BLD-MCNC: 0.8 MG/DL (ref 0.76–1.27)
CRP SERPL-MCNC: 4.73 MG/DL (ref 0–0.5)
DEPRECATED RDW RBC AUTO: 43.9 FL (ref 37–54)
EOSINOPHIL # BLD AUTO: 0.44 10*3/MM3 (ref 0–0.4)
EOSINOPHIL NFR BLD AUTO: 6.2 % (ref 0.3–6.2)
ERYTHROCYTE [DISTWIDTH] IN BLOOD BY AUTOMATED COUNT: 14.2 % (ref 12.3–15.4)
FERRITIN SERPL-MCNC: 194 NG/ML (ref 30–400)
FOLATE BLD-MCNC: 327 NG/ML
FOLATE RBC-MCNC: 1025 NG/ML
GFR SERPL CREATININE-BSD FRML MDRD: 96 ML/MIN/1.73
GLOBULIN UR ELPH-MCNC: 3.2 GM/DL
GLUCOSE BLD-MCNC: 137 MG/DL (ref 65–99)
GLUCOSE BLDC GLUCOMTR-MCNC: 132 MG/DL (ref 70–130)
GLUCOSE BLDC GLUCOMTR-MCNC: 154 MG/DL (ref 70–130)
GLUCOSE BLDC GLUCOMTR-MCNC: 155 MG/DL (ref 70–130)
GRAM STN SPEC: NORMAL
GRAM STN SPEC: NORMAL
HCO3 BLDA-SCNC: 27.8 MMOL/L (ref 22–28)
HCT VFR BLD AUTO: 30.1 % (ref 37.5–51)
HCT VFR BLD AUTO: 31.9 % (ref 37.5–51)
HGB BLD-MCNC: 9.9 G/DL (ref 13–17.7)
HOROWITZ INDEX BLD+IHG-RTO: 40 %
IMM GRANULOCYTES # BLD AUTO: 0.14 10*3/MM3 (ref 0–0.05)
IMM GRANULOCYTES NFR BLD AUTO: 2 % (ref 0–0.5)
LEFT ATRIUM VOLUME INDEX: 14.7 ML/M2
LYMPHOCYTES # BLD AUTO: 1.07 10*3/MM3 (ref 0.7–3.1)
LYMPHOCYTES NFR BLD AUTO: 15.2 % (ref 19.6–45.3)
MAXIMAL PREDICTED HEART RATE: 153 BPM
MCH RBC QN AUTO: 27.8 PG (ref 26.6–33)
MCHC RBC AUTO-ENTMCNC: 32.9 G/DL (ref 31.5–35.7)
MCV RBC AUTO: 84.6 FL (ref 79–97)
MODALITY: ABNORMAL
MONOCYTES # BLD AUTO: 0.55 10*3/MM3 (ref 0.1–0.9)
MONOCYTES NFR BLD AUTO: 7.8 % (ref 5–12)
NEUTROPHILS # BLD AUTO: 4.81 10*3/MM3 (ref 1.7–7)
NEUTROPHILS NFR BLD AUTO: 68.1 % (ref 42.7–76)
NRBC BLD AUTO-RTO: 0 /100 WBC (ref 0–0.2)
NT-PROBNP SERPL-MCNC: 4504 PG/ML (ref 5–900)
O2 A-A PPRESDIFF RESPIRATORY: 0.2 MMHG
PCO2 BLDA: 40.3 MM HG (ref 35–45)
PEEP RESPIRATORY: 7 CM[H2O]
PH BLDA: 7.45 PH UNITS (ref 7.35–7.45)
PLATELET # BLD AUTO: 214 10*3/MM3 (ref 140–450)
PMV BLD AUTO: 10.4 FL (ref 6–12)
PO2 BLDA: 57.2 MM HG (ref 80–100)
POTASSIUM BLD-SCNC: 3.5 MMOL/L (ref 3.5–5.2)
POTASSIUM BLD-SCNC: 3.7 MMOL/L (ref 3.5–5.2)
PROT SERPL-MCNC: 5.6 G/DL (ref 6–8.5)
PSV: 8 CMH2O
RBC # BLD AUTO: 3.56 10*6/MM3 (ref 4.14–5.8)
SAO2 % BLDCOA: 90.5 % (ref 92–99)
SODIUM BLD-SCNC: 138 MMOL/L (ref 136–145)
STRESS TARGET HR: 130 BPM
TOTAL RATE: 20 BREATHS/MINUTE
TROPONIN T SERPL-MCNC: 0.01 NG/ML (ref 0–0.03)
VENTILATOR MODE: ABNORMAL
WBC NRBC COR # BLD: 7.06 10*3/MM3 (ref 3.4–10.8)

## 2020-04-23 PROCEDURE — 25010000002 FUROSEMIDE PER 20 MG: Performed by: INTERNAL MEDICINE

## 2020-04-23 PROCEDURE — 82962 GLUCOSE BLOOD TEST: CPT

## 2020-04-23 PROCEDURE — 99232 SBSQ HOSP IP/OBS MODERATE 35: CPT | Performed by: INTERNAL MEDICINE

## 2020-04-23 PROCEDURE — 85025 COMPLETE CBC W/AUTO DIFF WBC: CPT | Performed by: INTERNAL MEDICINE

## 2020-04-23 PROCEDURE — 0 IOPAMIDOL PER 1 ML: Performed by: HOSPITALIST

## 2020-04-23 PROCEDURE — 84484 ASSAY OF TROPONIN QUANT: CPT | Performed by: INTERNAL MEDICINE

## 2020-04-23 PROCEDURE — 80053 COMPREHEN METABOLIC PANEL: CPT | Performed by: INTERNAL MEDICINE

## 2020-04-23 PROCEDURE — 84132 ASSAY OF SERUM POTASSIUM: CPT | Performed by: INTERNAL MEDICINE

## 2020-04-23 PROCEDURE — 93306 TTE W/DOPPLER COMPLETE: CPT | Performed by: INTERNAL MEDICINE

## 2020-04-23 PROCEDURE — 94770: CPT

## 2020-04-23 PROCEDURE — 82803 BLOOD GASES ANY COMBINATION: CPT

## 2020-04-23 PROCEDURE — 94003 VENT MGMT INPAT SUBQ DAY: CPT

## 2020-04-23 PROCEDURE — 94799 UNLISTED PULMONARY SVC/PX: CPT

## 2020-04-23 PROCEDURE — 70450 CT HEAD/BRAIN W/O DYE: CPT

## 2020-04-23 PROCEDURE — 25010000002 PIPERACILLIN SOD-TAZOBACTAM PER 1 G: Performed by: INTERNAL MEDICINE

## 2020-04-23 PROCEDURE — 71275 CT ANGIOGRAPHY CHEST: CPT

## 2020-04-23 PROCEDURE — 36600 WITHDRAWAL OF ARTERIAL BLOOD: CPT

## 2020-04-23 PROCEDURE — 86140 C-REACTIVE PROTEIN: CPT | Performed by: INTERNAL MEDICINE

## 2020-04-23 PROCEDURE — 82550 ASSAY OF CK (CPK): CPT | Performed by: INTERNAL MEDICINE

## 2020-04-23 PROCEDURE — 99231 SBSQ HOSP IP/OBS SF/LOW 25: CPT | Performed by: NURSE PRACTITIONER

## 2020-04-23 PROCEDURE — 71045 X-RAY EXAM CHEST 1 VIEW: CPT

## 2020-04-23 PROCEDURE — 83880 ASSAY OF NATRIURETIC PEPTIDE: CPT | Performed by: INTERNAL MEDICINE

## 2020-04-23 PROCEDURE — 63710000001 INSULIN LISPRO (HUMAN) PER 5 UNITS: Performed by: INTERNAL MEDICINE

## 2020-04-23 PROCEDURE — 93306 TTE W/DOPPLER COMPLETE: CPT

## 2020-04-23 PROCEDURE — 82728 ASSAY OF FERRITIN: CPT | Performed by: INTERNAL MEDICINE

## 2020-04-23 PROCEDURE — 25010000002 FENTANYL CITRATE (PF) 100 MCG/2ML SOLUTION: Performed by: INTERNAL MEDICINE

## 2020-04-23 PROCEDURE — 25010000002 PROPOFOL 10 MG/ML EMULSION: Performed by: INTERNAL MEDICINE

## 2020-04-23 RX ORDER — LABETALOL HYDROCHLORIDE 5 MG/ML
20 INJECTION, SOLUTION INTRAVENOUS ONCE
Status: COMPLETED | OUTPATIENT
Start: 2020-04-23 | End: 2020-04-23

## 2020-04-23 RX ORDER — POLYETHYLENE GLYCOL 3350 17 G/17G
17 POWDER, FOR SOLUTION ORAL DAILY
Status: DISCONTINUED | OUTPATIENT
Start: 2020-04-23 | End: 2020-04-25

## 2020-04-23 RX ORDER — LABETALOL HYDROCHLORIDE 5 MG/ML
40 INJECTION, SOLUTION INTRAVENOUS AS NEEDED
Status: DISCONTINUED | OUTPATIENT
Start: 2020-04-23 | End: 2020-04-30 | Stop reason: HOSPADM

## 2020-04-23 RX ORDER — FUROSEMIDE 10 MG/ML
40 INJECTION INTRAMUSCULAR; INTRAVENOUS ONCE
Status: COMPLETED | OUTPATIENT
Start: 2020-04-23 | End: 2020-04-23

## 2020-04-23 RX ADMIN — POTASSIUM CHLORIDE 40 MEQ: 1.5 POWDER, FOR SOLUTION ORAL at 12:11

## 2020-04-23 RX ADMIN — DEXMEDETOMIDINE HYDROCHLORIDE 0.8 MCG/KG/HR: 4 INJECTION INTRAVENOUS at 11:40

## 2020-04-23 RX ADMIN — CHLORHEXIDINE GLUCONATE 15 ML: 1.2 RINSE ORAL at 20:09

## 2020-04-23 RX ADMIN — DEXMEDETOMIDINE HYDROCHLORIDE 0.5 MCG/KG/HR: 4 INJECTION INTRAVENOUS at 07:07

## 2020-04-23 RX ADMIN — DEXMEDETOMIDINE HYDROCHLORIDE 1.5 MCG/KG/HR: 4 INJECTION INTRAVENOUS at 16:19

## 2020-04-23 RX ADMIN — FENTANYL CITRATE 50 MCG: 0.05 INJECTION, SOLUTION INTRAMUSCULAR; INTRAVENOUS at 06:36

## 2020-04-23 RX ADMIN — CHLORHEXIDINE GLUCONATE 15 ML: 1.2 RINSE ORAL at 08:25

## 2020-04-23 RX ADMIN — FENTANYL CITRATE 50 MCG: 0.05 INJECTION, SOLUTION INTRAMUSCULAR; INTRAVENOUS at 01:22

## 2020-04-23 RX ADMIN — LANSOPRAZOLE 30 MG: KIT at 06:36

## 2020-04-23 RX ADMIN — FENTANYL CITRATE 50 MCG: 0.05 INJECTION, SOLUTION INTRAMUSCULAR; INTRAVENOUS at 21:50

## 2020-04-23 RX ADMIN — FENTANYL CITRATE 50 MCG: 0.05 INJECTION, SOLUTION INTRAMUSCULAR; INTRAVENOUS at 13:23

## 2020-04-23 RX ADMIN — DEXMEDETOMIDINE HYDROCHLORIDE 0.8 MCG/KG/HR: 4 INJECTION INTRAVENOUS at 14:19

## 2020-04-23 RX ADMIN — LABETALOL HYDROCHLORIDE 20 MG: 5 INJECTION, SOLUTION INTRAVENOUS at 16:14

## 2020-04-23 RX ADMIN — PROPOFOL 40 MCG/KG/MIN: 10 INJECTION, EMULSION INTRAVENOUS at 10:01

## 2020-04-23 RX ADMIN — PROPOFOL 45 MCG/KG/MIN: 10 INJECTION, EMULSION INTRAVENOUS at 03:37

## 2020-04-23 RX ADMIN — FENTANYL CITRATE 50 MCG: 0.05 INJECTION, SOLUTION INTRAMUSCULAR; INTRAVENOUS at 11:19

## 2020-04-23 RX ADMIN — FENTANYL CITRATE 50 MCG: 0.05 INJECTION, SOLUTION INTRAMUSCULAR; INTRAVENOUS at 14:48

## 2020-04-23 RX ADMIN — FUROSEMIDE 40 MG: 10 INJECTION, SOLUTION INTRAMUSCULAR; INTRAVENOUS at 15:05

## 2020-04-23 RX ADMIN — DEXMEDETOMIDINE HYDROCHLORIDE 0.6 MCG/KG/HR: 4 INJECTION INTRAVENOUS at 03:36

## 2020-04-23 RX ADMIN — IOPAMIDOL 95 ML: 755 INJECTION, SOLUTION INTRAVENOUS at 17:47

## 2020-04-23 RX ADMIN — TAZOBACTAM SODIUM AND PIPERACILLIN SODIUM 3.38 G: 375; 3 INJECTION, SOLUTION INTRAVENOUS at 14:19

## 2020-04-23 RX ADMIN — FENTANYL CITRATE 50 MCG: 0.05 INJECTION, SOLUTION INTRAMUSCULAR; INTRAVENOUS at 04:15

## 2020-04-23 RX ADMIN — DEXMEDETOMIDINE HYDROCHLORIDE 1.5 MCG/KG/HR: 4 INJECTION INTRAVENOUS at 22:58

## 2020-04-23 RX ADMIN — DEXMEDETOMIDINE HYDROCHLORIDE 0.7 MCG/KG/HR: 4 INJECTION INTRAVENOUS at 00:40

## 2020-04-23 RX ADMIN — INSULIN LISPRO 3 UNITS: 100 INJECTION, SOLUTION INTRAVENOUS; SUBCUTANEOUS at 05:21

## 2020-04-23 RX ADMIN — POLYETHYLENE GLYCOL 3350 17 G: 17 POWDER, FOR SOLUTION ORAL at 12:11

## 2020-04-23 RX ADMIN — PROPOFOL 50 MCG/KG/MIN: 10 INJECTION, EMULSION INTRAVENOUS at 01:22

## 2020-04-23 RX ADMIN — CLOMIPRAMINE HYDROCHLORIDE 150 MG: 50 CAPSULE ORAL at 20:01

## 2020-04-23 RX ADMIN — POTASSIUM CHLORIDE 40 MEQ: 1.5 POWDER, FOR SOLUTION ORAL at 07:15

## 2020-04-23 RX ADMIN — DEXMEDETOMIDINE HYDROCHLORIDE 1.5 MCG/KG/HR: 4 INJECTION INTRAVENOUS at 21:50

## 2020-04-23 RX ADMIN — LANSOPRAZOLE 30 MG: KIT at 17:10

## 2020-04-23 RX ADMIN — TAZOBACTAM SODIUM AND PIPERACILLIN SODIUM 3.38 G: 375; 3 INJECTION, SOLUTION INTRAVENOUS at 06:36

## 2020-04-23 RX ADMIN — DEXMEDETOMIDINE HYDROCHLORIDE 1.5 MCG/KG/HR: 4 INJECTION INTRAVENOUS at 18:10

## 2020-04-23 RX ADMIN — DEXMEDETOMIDINE HYDROCHLORIDE 1.5 MCG/KG/HR: 4 INJECTION INTRAVENOUS at 20:05

## 2020-04-23 RX ADMIN — FENTANYL CITRATE 50 MCG: 0.05 INJECTION, SOLUTION INTRAMUSCULAR; INTRAVENOUS at 20:01

## 2020-04-23 RX ADMIN — INSULIN LISPRO 3 UNITS: 100 INJECTION, SOLUTION INTRAVENOUS; SUBCUTANEOUS at 18:58

## 2020-04-24 ENCOUNTER — APPOINTMENT (OUTPATIENT)
Dept: CT IMAGING | Facility: HOSPITAL | Age: 65
End: 2020-04-24

## 2020-04-24 ENCOUNTER — APPOINTMENT (OUTPATIENT)
Dept: MRI IMAGING | Facility: HOSPITAL | Age: 65
End: 2020-04-24

## 2020-04-24 LAB
ANION GAP SERPL CALCULATED.3IONS-SCNC: 11.6 MMOL/L (ref 5–15)
BUN BLD-MCNC: 16 MG/DL (ref 8–23)
BUN/CREAT SERPL: 25.4 (ref 7–25)
CALCIUM SPEC-SCNC: 8.5 MG/DL (ref 8.6–10.5)
CHLORIDE SERPL-SCNC: 99 MMOL/L (ref 98–107)
CO2 SERPL-SCNC: 23.4 MMOL/L (ref 22–29)
CREAT BLD-MCNC: 0.63 MG/DL (ref 0.76–1.27)
DEPRECATED RDW RBC AUTO: 42.8 FL (ref 37–54)
ERYTHROCYTE [DISTWIDTH] IN BLOOD BY AUTOMATED COUNT: 14.1 % (ref 12.3–15.4)
GFR SERPL CREATININE-BSD FRML MDRD: 127 ML/MIN/1.73
GLUCOSE BLD-MCNC: 155 MG/DL (ref 65–99)
GLUCOSE BLDC GLUCOMTR-MCNC: 128 MG/DL (ref 70–130)
GLUCOSE BLDC GLUCOMTR-MCNC: 137 MG/DL (ref 70–130)
GLUCOSE BLDC GLUCOMTR-MCNC: 138 MG/DL (ref 70–130)
GLUCOSE BLDC GLUCOMTR-MCNC: 176 MG/DL (ref 70–130)
GLUCOSE BLDC GLUCOMTR-MCNC: 18 MG/DL (ref 70–130)
HCT VFR BLD AUTO: 33.9 % (ref 37.5–51)
HGB BLD-MCNC: 11.4 G/DL (ref 13–17.7)
MCH RBC QN AUTO: 28 PG (ref 26.6–33)
MCHC RBC AUTO-ENTMCNC: 33.6 G/DL (ref 31.5–35.7)
MCV RBC AUTO: 83.3 FL (ref 79–97)
NT-PROBNP SERPL-MCNC: 5428 PG/ML (ref 5–900)
PLATELET # BLD AUTO: 274 10*3/MM3 (ref 140–450)
PMV BLD AUTO: 10.7 FL (ref 6–12)
POTASSIUM BLD-SCNC: 3.6 MMOL/L (ref 3.5–5.2)
POTASSIUM BLD-SCNC: 3.8 MMOL/L (ref 3.5–5.2)
RBC # BLD AUTO: 4.07 10*6/MM3 (ref 4.14–5.8)
SODIUM BLD-SCNC: 134 MMOL/L (ref 136–145)
WBC NRBC COR # BLD: 8.1 10*3/MM3 (ref 3.4–10.8)

## 2020-04-24 PROCEDURE — 85027 COMPLETE CBC AUTOMATED: CPT | Performed by: INTERNAL MEDICINE

## 2020-04-24 PROCEDURE — 99221 1ST HOSP IP/OBS SF/LOW 40: CPT | Performed by: PSYCHIATRY & NEUROLOGY

## 2020-04-24 PROCEDURE — 84132 ASSAY OF SERUM POTASSIUM: CPT | Performed by: INTERNAL MEDICINE

## 2020-04-24 PROCEDURE — 0 GADOBENATE DIMEGLUMINE 529 MG/ML SOLUTION: Performed by: HOSPITALIST

## 2020-04-24 PROCEDURE — 94799 UNLISTED PULMONARY SVC/PX: CPT

## 2020-04-24 PROCEDURE — 70496 CT ANGIOGRAPHY HEAD: CPT

## 2020-04-24 PROCEDURE — 93005 ELECTROCARDIOGRAM TRACING: CPT | Performed by: INTERNAL MEDICINE

## 2020-04-24 PROCEDURE — 70498 CT ANGIOGRAPHY NECK: CPT

## 2020-04-24 PROCEDURE — 25010000002 FENTANYL CITRATE (PF) 100 MCG/2ML SOLUTION: Performed by: INTERNAL MEDICINE

## 2020-04-24 PROCEDURE — 25010000002 FUROSEMIDE PER 20 MG: Performed by: INTERNAL MEDICINE

## 2020-04-24 PROCEDURE — 63710000001 INSULIN LISPRO (HUMAN) PER 5 UNITS: Performed by: INTERNAL MEDICINE

## 2020-04-24 PROCEDURE — A9577 INJ MULTIHANCE: HCPCS | Performed by: HOSPITALIST

## 2020-04-24 PROCEDURE — 25010000002 PROPOFOL 10 MG/ML EMULSION: Performed by: INTERNAL MEDICINE

## 2020-04-24 PROCEDURE — 83880 ASSAY OF NATRIURETIC PEPTIDE: CPT | Performed by: INTERNAL MEDICINE

## 2020-04-24 PROCEDURE — 99221 1ST HOSP IP/OBS SF/LOW 40: CPT | Performed by: INTERNAL MEDICINE

## 2020-04-24 PROCEDURE — 94003 VENT MGMT INPAT SUBQ DAY: CPT

## 2020-04-24 PROCEDURE — 99231 SBSQ HOSP IP/OBS SF/LOW 25: CPT | Performed by: NURSE PRACTITIONER

## 2020-04-24 PROCEDURE — 94770: CPT

## 2020-04-24 PROCEDURE — 80048 BASIC METABOLIC PNL TOTAL CA: CPT | Performed by: INTERNAL MEDICINE

## 2020-04-24 PROCEDURE — 82962 GLUCOSE BLOOD TEST: CPT

## 2020-04-24 PROCEDURE — 70553 MRI BRAIN STEM W/O & W/DYE: CPT

## 2020-04-24 PROCEDURE — 93010 ELECTROCARDIOGRAM REPORT: CPT | Performed by: INTERNAL MEDICINE

## 2020-04-24 PROCEDURE — 99232 SBSQ HOSP IP/OBS MODERATE 35: CPT | Performed by: INTERNAL MEDICINE

## 2020-04-24 PROCEDURE — 25010000002 IOPAMIDOL 61 % SOLUTION: Performed by: HOSPITALIST

## 2020-04-24 RX ORDER — ATORVASTATIN CALCIUM 80 MG/1
80 TABLET, FILM COATED ORAL NIGHTLY
Status: DISCONTINUED | OUTPATIENT
Start: 2020-04-24 | End: 2020-04-30 | Stop reason: HOSPADM

## 2020-04-24 RX ORDER — SODIUM CHLORIDE 0.9 % (FLUSH) 0.9 %
10 SYRINGE (ML) INJECTION EVERY 12 HOURS SCHEDULED
Status: DISCONTINUED | OUTPATIENT
Start: 2020-04-24 | End: 2020-04-30 | Stop reason: HOSPADM

## 2020-04-24 RX ORDER — ASPIRIN 81 MG/1
81 TABLET ORAL DAILY
Status: DISCONTINUED | OUTPATIENT
Start: 2020-04-24 | End: 2020-04-24

## 2020-04-24 RX ORDER — SODIUM CHLORIDE 0.9 % (FLUSH) 0.9 %
10 SYRINGE (ML) INJECTION AS NEEDED
Status: DISCONTINUED | OUTPATIENT
Start: 2020-04-24 | End: 2020-04-30 | Stop reason: HOSPADM

## 2020-04-24 RX ORDER — ASPIRIN 300 MG/1
300 SUPPOSITORY RECTAL DAILY
Status: DISCONTINUED | OUTPATIENT
Start: 2020-04-24 | End: 2020-04-27

## 2020-04-24 RX ORDER — FUROSEMIDE 10 MG/ML
40 INJECTION INTRAMUSCULAR; INTRAVENOUS EVERY 8 HOURS
Status: COMPLETED | OUTPATIENT
Start: 2020-04-24 | End: 2020-04-25

## 2020-04-24 RX ORDER — ASPIRIN 325 MG
325 TABLET ORAL DAILY
Status: DISCONTINUED | OUTPATIENT
Start: 2020-04-24 | End: 2020-04-27

## 2020-04-24 RX ADMIN — ASPIRIN 81 MG: 81 TABLET, COATED ORAL at 11:08

## 2020-04-24 RX ADMIN — DEXMEDETOMIDINE HYDROCHLORIDE 1.4 MCG/KG/HR: 4 INJECTION INTRAVENOUS at 11:07

## 2020-04-24 RX ADMIN — CHLORHEXIDINE GLUCONATE 15 ML: 1.2 RINSE ORAL at 20:39

## 2020-04-24 RX ADMIN — FENTANYL CITRATE 50 MCG: 0.05 INJECTION, SOLUTION INTRAMUSCULAR; INTRAVENOUS at 14:31

## 2020-04-24 RX ADMIN — DEXMEDETOMIDINE HYDROCHLORIDE 1.4 MCG/KG/HR: 4 INJECTION INTRAVENOUS at 09:31

## 2020-04-24 RX ADMIN — INSULIN LISPRO 3 UNITS: 100 INJECTION, SOLUTION INTRAVENOUS; SUBCUTANEOUS at 17:36

## 2020-04-24 RX ADMIN — FENTANYL CITRATE 50 MCG: 0.05 INJECTION, SOLUTION INTRAMUSCULAR; INTRAVENOUS at 01:38

## 2020-04-24 RX ADMIN — POLYETHYLENE GLYCOL 3350 17 G: 17 POWDER, FOR SOLUTION ORAL at 08:28

## 2020-04-24 RX ADMIN — DEXMEDETOMIDINE HYDROCHLORIDE 1.5 MCG/KG/HR: 4 INJECTION INTRAVENOUS at 07:28

## 2020-04-24 RX ADMIN — SODIUM CHLORIDE, PRESERVATIVE FREE 10 ML: 5 INJECTION INTRAVENOUS at 13:06

## 2020-04-24 RX ADMIN — ATORVASTATIN CALCIUM 80 MG: 20 TABLET, FILM COATED ORAL at 20:35

## 2020-04-24 RX ADMIN — FENTANYL CITRATE 50 MCG: 0.05 INJECTION, SOLUTION INTRAMUSCULAR; INTRAVENOUS at 19:43

## 2020-04-24 RX ADMIN — DEXMEDETOMIDINE HYDROCHLORIDE 1.4 MCG/KG/HR: 4 INJECTION INTRAVENOUS at 00:21

## 2020-04-24 RX ADMIN — DEXMEDETOMIDINE HYDROCHLORIDE 1.4 MCG/KG/HR: 4 INJECTION INTRAVENOUS at 20:35

## 2020-04-24 RX ADMIN — FENTANYL CITRATE 50 MCG: 0.05 INJECTION, SOLUTION INTRAMUSCULAR; INTRAVENOUS at 04:40

## 2020-04-24 RX ADMIN — FENTANYL CITRATE 50 MCG: 0.05 INJECTION, SOLUTION INTRAMUSCULAR; INTRAVENOUS at 05:39

## 2020-04-24 RX ADMIN — DEXMEDETOMIDINE HYDROCHLORIDE 1.4 MCG/KG/HR: 4 INJECTION INTRAVENOUS at 17:02

## 2020-04-24 RX ADMIN — CHLORHEXIDINE GLUCONATE 15 ML: 1.2 RINSE ORAL at 08:29

## 2020-04-24 RX ADMIN — DEXMEDETOMIDINE HYDROCHLORIDE 1.5 MCG/KG/HR: 4 INJECTION INTRAVENOUS at 05:55

## 2020-04-24 RX ADMIN — FUROSEMIDE 40 MG: 10 INJECTION, SOLUTION INTRAMUSCULAR; INTRAVENOUS at 08:28

## 2020-04-24 RX ADMIN — LANSOPRAZOLE 30 MG: KIT at 07:26

## 2020-04-24 RX ADMIN — FUROSEMIDE 40 MG: 10 INJECTION, SOLUTION INTRAMUSCULAR; INTRAVENOUS at 17:15

## 2020-04-24 RX ADMIN — LANSOPRAZOLE 30 MG: KIT at 17:15

## 2020-04-24 RX ADMIN — NITROGLYCERIN 1 INCH: 20 OINTMENT TOPICAL at 11:09

## 2020-04-24 RX ADMIN — POTASSIUM CHLORIDE 40 MEQ: 1.5 POWDER, FOR SOLUTION ORAL at 11:08

## 2020-04-24 RX ADMIN — IOPAMIDOL 95 ML: 612 INJECTION, SOLUTION INTRAVENOUS at 16:05

## 2020-04-24 RX ADMIN — NITROGLYCERIN 1 INCH: 20 OINTMENT TOPICAL at 17:15

## 2020-04-24 RX ADMIN — BISACODYL 10 MG: 10 SUPPOSITORY RECTAL at 08:29

## 2020-04-24 RX ADMIN — GADOBENATE DIMEGLUMINE 18 ML: 529 INJECTION, SOLUTION INTRAVENOUS at 15:27

## 2020-04-24 RX ADMIN — DEXMEDETOMIDINE HYDROCHLORIDE 1.4 MCG/KG/HR: 4 INJECTION INTRAVENOUS at 18:45

## 2020-04-24 RX ADMIN — CLOMIPRAMINE HYDROCHLORIDE 150 MG: 50 CAPSULE ORAL at 20:39

## 2020-04-24 RX ADMIN — DEXMEDETOMIDINE HYDROCHLORIDE 1.4 MCG/KG/HR: 4 INJECTION INTRAVENOUS at 03:33

## 2020-04-24 RX ADMIN — DEXMEDETOMIDINE HYDROCHLORIDE 1.5 MCG/KG/HR: 4 INJECTION INTRAVENOUS at 04:49

## 2020-04-24 RX ADMIN — DEXMEDETOMIDINE HYDROCHLORIDE 1.5 MCG/KG/HR: 4 INJECTION INTRAVENOUS at 14:20

## 2020-04-24 RX ADMIN — FENTANYL CITRATE 50 MCG: 0.05 INJECTION, SOLUTION INTRAMUSCULAR; INTRAVENOUS at 00:21

## 2020-04-24 RX ADMIN — DEXMEDETOMIDINE HYDROCHLORIDE 1.5 MCG/KG/HR: 4 INJECTION INTRAVENOUS at 01:38

## 2020-04-24 RX ADMIN — DEXMEDETOMIDINE HYDROCHLORIDE 1.4 MCG/KG/HR: 4 INJECTION INTRAVENOUS at 12:40

## 2020-04-24 RX ADMIN — PROPOFOL 15 MCG/KG/MIN: 10 INJECTION, EMULSION INTRAVENOUS at 04:40

## 2020-04-24 RX ADMIN — POTASSIUM CHLORIDE 40 MEQ: 1.5 POWDER, FOR SOLUTION ORAL at 07:26

## 2020-04-24 RX ADMIN — PROPOFOL 35 MCG/KG/MIN: 10 INJECTION, EMULSION INTRAVENOUS at 14:20

## 2020-04-25 ENCOUNTER — APPOINTMENT (OUTPATIENT)
Dept: CT IMAGING | Facility: HOSPITAL | Age: 65
End: 2020-04-25

## 2020-04-25 ENCOUNTER — APPOINTMENT (OUTPATIENT)
Dept: GENERAL RADIOLOGY | Facility: HOSPITAL | Age: 65
End: 2020-04-25

## 2020-04-25 LAB
ANION GAP SERPL CALCULATED.3IONS-SCNC: 9.7 MMOL/L (ref 5–15)
BUN BLD-MCNC: 20 MG/DL (ref 8–23)
BUN/CREAT SERPL: 29 (ref 7–25)
CALCIUM SPEC-SCNC: 8.7 MG/DL (ref 8.6–10.5)
CHLORIDE SERPL-SCNC: 95 MMOL/L (ref 98–107)
CHOLEST SERPL-MCNC: 139 MG/DL (ref 0–200)
CO2 SERPL-SCNC: 28.3 MMOL/L (ref 22–29)
CREAT BLD-MCNC: 0.69 MG/DL (ref 0.76–1.27)
DEPRECATED RDW RBC AUTO: 43.5 FL (ref 37–54)
ERYTHROCYTE [DISTWIDTH] IN BLOOD BY AUTOMATED COUNT: 14.1 % (ref 12.3–15.4)
GFR SERPL CREATININE-BSD FRML MDRD: 114 ML/MIN/1.73
GLUCOSE BLD-MCNC: 137 MG/DL (ref 65–99)
GLUCOSE BLDC GLUCOMTR-MCNC: 130 MG/DL (ref 70–130)
GLUCOSE BLDC GLUCOMTR-MCNC: 142 MG/DL (ref 70–130)
GLUCOSE BLDC GLUCOMTR-MCNC: 96 MG/DL (ref 70–130)
HBA1C MFR BLD: 5.3 % (ref 4.8–5.6)
HCT VFR BLD AUTO: 32.3 % (ref 37.5–51)
HDLC SERPL-MCNC: 25 MG/DL (ref 40–60)
HGB BLD-MCNC: 10.5 G/DL (ref 13–17.7)
LDLC SERPL CALC-MCNC: 81 MG/DL (ref 0–100)
LDLC/HDLC SERPL: 3.22 {RATIO}
MCH RBC QN AUTO: 27.4 PG (ref 26.6–33)
MCHC RBC AUTO-ENTMCNC: 32.5 G/DL (ref 31.5–35.7)
MCV RBC AUTO: 84.3 FL (ref 79–97)
PLATELET # BLD AUTO: 257 10*3/MM3 (ref 140–450)
PMV BLD AUTO: 10.8 FL (ref 6–12)
POTASSIUM BLD-SCNC: 3.6 MMOL/L (ref 3.5–5.2)
RBC # BLD AUTO: 3.83 10*6/MM3 (ref 4.14–5.8)
SODIUM BLD-SCNC: 133 MMOL/L (ref 136–145)
TRIGL SERPL-MCNC: 167 MG/DL (ref 0–150)
TROPONIN T SERPL-MCNC: 0.03 NG/ML (ref 0–0.03)
VLDLC SERPL-MCNC: 33.4 MG/DL (ref 5–40)
WBC NRBC COR # BLD: 7.18 10*3/MM3 (ref 3.4–10.8)

## 2020-04-25 PROCEDURE — 25010000002 FENTANYL CITRATE (PF) 100 MCG/2ML SOLUTION: Performed by: INTERNAL MEDICINE

## 2020-04-25 PROCEDURE — 25010000002 FUROSEMIDE PER 20 MG: Performed by: INTERNAL MEDICINE

## 2020-04-25 PROCEDURE — 93005 ELECTROCARDIOGRAM TRACING: CPT | Performed by: INTERNAL MEDICINE

## 2020-04-25 PROCEDURE — 94799 UNLISTED PULMONARY SVC/PX: CPT

## 2020-04-25 PROCEDURE — 84484 ASSAY OF TROPONIN QUANT: CPT | Performed by: INTERNAL MEDICINE

## 2020-04-25 PROCEDURE — 70450 CT HEAD/BRAIN W/O DYE: CPT

## 2020-04-25 PROCEDURE — 99232 SBSQ HOSP IP/OBS MODERATE 35: CPT | Performed by: INTERNAL MEDICINE

## 2020-04-25 PROCEDURE — 80048 BASIC METABOLIC PNL TOTAL CA: CPT | Performed by: INTERNAL MEDICINE

## 2020-04-25 PROCEDURE — 71045 X-RAY EXAM CHEST 1 VIEW: CPT

## 2020-04-25 PROCEDURE — 93010 ELECTROCARDIOGRAM REPORT: CPT | Performed by: INTERNAL MEDICINE

## 2020-04-25 PROCEDURE — 82962 GLUCOSE BLOOD TEST: CPT

## 2020-04-25 PROCEDURE — 85027 COMPLETE CBC AUTOMATED: CPT | Performed by: INTERNAL MEDICINE

## 2020-04-25 PROCEDURE — 99232 SBSQ HOSP IP/OBS MODERATE 35: CPT | Performed by: PSYCHIATRY & NEUROLOGY

## 2020-04-25 PROCEDURE — 83036 HEMOGLOBIN GLYCOSYLATED A1C: CPT | Performed by: PSYCHIATRY & NEUROLOGY

## 2020-04-25 PROCEDURE — 25010000002 PROPOFOL 10 MG/ML EMULSION: Performed by: INTERNAL MEDICINE

## 2020-04-25 PROCEDURE — 80061 LIPID PANEL: CPT | Performed by: PSYCHIATRY & NEUROLOGY

## 2020-04-25 PROCEDURE — 94003 VENT MGMT INPAT SUBQ DAY: CPT

## 2020-04-25 RX ORDER — POTASSIUM CHLORIDE 1.5 G/1.77G
20 POWDER, FOR SOLUTION ORAL DAILY
Status: DISCONTINUED | OUTPATIENT
Start: 2020-04-25 | End: 2020-04-30 | Stop reason: HOSPADM

## 2020-04-25 RX ORDER — DOCUSATE SODIUM 50 MG/5 ML
100 LIQUID (ML) ORAL DAILY
Status: DISCONTINUED | OUTPATIENT
Start: 2020-04-25 | End: 2020-04-28

## 2020-04-25 RX ORDER — FUROSEMIDE 10 MG/ML
40 INJECTION INTRAMUSCULAR; INTRAVENOUS DAILY
Status: DISCONTINUED | OUTPATIENT
Start: 2020-04-25 | End: 2020-04-28

## 2020-04-25 RX ORDER — POLYETHYLENE GLYCOL 3350 17 G/17G
17 POWDER, FOR SOLUTION ORAL 2 TIMES DAILY
Status: DISCONTINUED | OUTPATIENT
Start: 2020-04-25 | End: 2020-04-28

## 2020-04-25 RX ORDER — HYDROCODONE BITARTRATE AND ACETAMINOPHEN 5; 325 MG/1; MG/1
1 TABLET ORAL EVERY 4 HOURS PRN
Status: DISCONTINUED | OUTPATIENT
Start: 2020-04-25 | End: 2020-04-26

## 2020-04-25 RX ORDER — SENNA LEAF EXTRACT 176MG/5ML
5 SYRUP ORAL 2 TIMES DAILY
Status: DISCONTINUED | OUTPATIENT
Start: 2020-04-25 | End: 2020-04-28

## 2020-04-25 RX ADMIN — DEXMEDETOMIDINE HYDROCHLORIDE 1.3 MCG/KG/HR: 4 INJECTION INTRAVENOUS at 07:51

## 2020-04-25 RX ADMIN — POTASSIUM CHLORIDE 20 MEQ: 1.5 POWDER, FOR SOLUTION ORAL at 10:07

## 2020-04-25 RX ADMIN — DEXMEDETOMIDINE HYDROCHLORIDE 1.4 MCG/KG/HR: 4 INJECTION INTRAVENOUS at 11:27

## 2020-04-25 RX ADMIN — ATORVASTATIN CALCIUM 80 MG: 20 TABLET, FILM COATED ORAL at 20:28

## 2020-04-25 RX ADMIN — DEXMEDETOMIDINE HYDROCHLORIDE 1.4 MCG/KG/HR: 4 INJECTION INTRAVENOUS at 09:52

## 2020-04-25 RX ADMIN — LANSOPRAZOLE 30 MG: KIT at 06:39

## 2020-04-25 RX ADMIN — DEXMEDETOMIDINE HYDROCHLORIDE 1 MCG/KG/HR: 4 INJECTION INTRAVENOUS at 15:15

## 2020-04-25 RX ADMIN — PROPOFOL 20 MCG/KG/MIN: 10 INJECTION, EMULSION INTRAVENOUS at 00:10

## 2020-04-25 RX ADMIN — PROPOFOL 10 MCG/KG/MIN: 10 INJECTION, EMULSION INTRAVENOUS at 10:46

## 2020-04-25 RX ADMIN — POLYETHYLENE GLYCOL 3350 17 G: 17 POWDER, FOR SOLUTION ORAL at 08:06

## 2020-04-25 RX ADMIN — BISACODYL 10 MG: 10 SUPPOSITORY RECTAL at 08:06

## 2020-04-25 RX ADMIN — POLYETHYLENE GLYCOL 3350 17 G: 17 POWDER, FOR SOLUTION ORAL at 20:28

## 2020-04-25 RX ADMIN — NITROGLYCERIN 1 INCH: 20 OINTMENT TOPICAL at 00:17

## 2020-04-25 RX ADMIN — DEXMEDETOMIDINE HYDROCHLORIDE 0.3 MCG/KG/HR: 4 INJECTION INTRAVENOUS at 20:28

## 2020-04-25 RX ADMIN — SODIUM CHLORIDE, PRESERVATIVE FREE 10 ML: 5 INJECTION INTRAVENOUS at 08:06

## 2020-04-25 RX ADMIN — DEXMEDETOMIDINE HYDROCHLORIDE 1.4 MCG/KG/HR: 4 INJECTION INTRAVENOUS at 12:58

## 2020-04-25 RX ADMIN — FUROSEMIDE 40 MG: 10 INJECTION, SOLUTION INTRAMUSCULAR; INTRAVENOUS at 10:07

## 2020-04-25 RX ADMIN — DEXMEDETOMIDINE HYDROCHLORIDE 1.3 MCG/KG/HR: 4 INJECTION INTRAVENOUS at 06:39

## 2020-04-25 RX ADMIN — CHLORHEXIDINE GLUCONATE 15 ML: 1.2 RINSE ORAL at 08:07

## 2020-04-25 RX ADMIN — NITROGLYCERIN 1 INCH: 20 OINTMENT TOPICAL at 11:01

## 2020-04-25 RX ADMIN — CLOMIPRAMINE HYDROCHLORIDE 150 MG: 50 CAPSULE ORAL at 20:28

## 2020-04-25 RX ADMIN — LANSOPRAZOLE 30 MG: KIT at 18:34

## 2020-04-25 RX ADMIN — HYDROCODONE BITARTRATE AND ACETAMINOPHEN 1 TABLET: 5; 325 TABLET ORAL at 16:28

## 2020-04-25 RX ADMIN — POTASSIUM CHLORIDE 40 MEQ: 1.5 POWDER, FOR SOLUTION ORAL at 06:39

## 2020-04-25 RX ADMIN — NITROGLYCERIN 1 INCH: 20 OINTMENT TOPICAL at 06:39

## 2020-04-25 RX ADMIN — FUROSEMIDE 40 MG: 10 INJECTION, SOLUTION INTRAMUSCULAR; INTRAVENOUS at 02:38

## 2020-04-25 RX ADMIN — FENTANYL CITRATE 50 MCG: 0.05 INJECTION, SOLUTION INTRAMUSCULAR; INTRAVENOUS at 04:20

## 2020-04-25 RX ADMIN — DOCUSATE SODIUM 100 MG: 50 LIQUID ORAL at 15:08

## 2020-04-25 RX ADMIN — DEXMEDETOMIDINE HYDROCHLORIDE 1.3 MCG/KG/HR: 4 INJECTION INTRAVENOUS at 03:35

## 2020-04-25 RX ADMIN — NITROGLYCERIN 1 INCH: 20 OINTMENT TOPICAL at 18:34

## 2020-04-25 RX ADMIN — HYDROCODONE BITARTRATE AND ACETAMINOPHEN 1 TABLET: 5; 325 TABLET ORAL at 20:28

## 2020-04-25 RX ADMIN — ASPIRIN 325 MG: 325 TABLET ORAL at 08:06

## 2020-04-25 RX ADMIN — FENTANYL CITRATE 50 MCG: 0.05 INJECTION, SOLUTION INTRAMUSCULAR; INTRAVENOUS at 07:51

## 2020-04-25 RX ADMIN — DEXMEDETOMIDINE HYDROCHLORIDE 1.4 MCG/KG/HR: 4 INJECTION INTRAVENOUS at 00:10

## 2020-04-25 RX ADMIN — DEXMEDETOMIDINE HYDROCHLORIDE 1.3 MCG/KG/HR: 4 INJECTION INTRAVENOUS at 02:38

## 2020-04-26 ENCOUNTER — APPOINTMENT (OUTPATIENT)
Dept: GENERAL RADIOLOGY | Facility: HOSPITAL | Age: 65
End: 2020-04-26

## 2020-04-26 ENCOUNTER — APPOINTMENT (OUTPATIENT)
Dept: CARDIOLOGY | Facility: HOSPITAL | Age: 65
End: 2020-04-26

## 2020-04-26 LAB
ANION GAP SERPL CALCULATED.3IONS-SCNC: 12.8 MMOL/L (ref 5–15)
BUN BLD-MCNC: 20 MG/DL (ref 8–23)
BUN/CREAT SERPL: 27.8 (ref 7–25)
CALCIUM SPEC-SCNC: 8.5 MG/DL (ref 8.6–10.5)
CHLORIDE SERPL-SCNC: 96 MMOL/L (ref 98–107)
CO2 SERPL-SCNC: 23.2 MMOL/L (ref 22–29)
CREAT BLD-MCNC: 0.72 MG/DL (ref 0.76–1.27)
GFR SERPL CREATININE-BSD FRML MDRD: 109 ML/MIN/1.73
GLUCOSE BLD-MCNC: 126 MG/DL (ref 65–99)
GLUCOSE BLDC GLUCOMTR-MCNC: 100 MG/DL (ref 70–130)
GLUCOSE BLDC GLUCOMTR-MCNC: 129 MG/DL (ref 70–130)
GLUCOSE BLDC GLUCOMTR-MCNC: 130 MG/DL (ref 70–130)
POTASSIUM BLD-SCNC: 3.3 MMOL/L (ref 3.5–5.2)
SODIUM BLD-SCNC: 132 MMOL/L (ref 136–145)
TROPONIN T SERPL-MCNC: 0.03 NG/ML (ref 0–0.03)

## 2020-04-26 PROCEDURE — 94799 UNLISTED PULMONARY SVC/PX: CPT

## 2020-04-26 PROCEDURE — 99232 SBSQ HOSP IP/OBS MODERATE 35: CPT | Performed by: INTERNAL MEDICINE

## 2020-04-26 PROCEDURE — 25010000002 FUROSEMIDE PER 20 MG: Performed by: INTERNAL MEDICINE

## 2020-04-26 PROCEDURE — 80048 BASIC METABOLIC PNL TOTAL CA: CPT | Performed by: INTERNAL MEDICINE

## 2020-04-26 PROCEDURE — 25010000002 ONDANSETRON PER 1 MG: Performed by: INTERNAL MEDICINE

## 2020-04-26 PROCEDURE — 93010 ELECTROCARDIOGRAM REPORT: CPT | Performed by: INTERNAL MEDICINE

## 2020-04-26 PROCEDURE — 93970 EXTREMITY STUDY: CPT

## 2020-04-26 PROCEDURE — 74018 RADEX ABDOMEN 1 VIEW: CPT

## 2020-04-26 PROCEDURE — 82962 GLUCOSE BLOOD TEST: CPT

## 2020-04-26 PROCEDURE — 84484 ASSAY OF TROPONIN QUANT: CPT | Performed by: INTERNAL MEDICINE

## 2020-04-26 PROCEDURE — 99221 1ST HOSP IP/OBS SF/LOW 40: CPT | Performed by: NURSE PRACTITIONER

## 2020-04-26 PROCEDURE — 99232 SBSQ HOSP IP/OBS MODERATE 35: CPT | Performed by: PSYCHIATRY & NEUROLOGY

## 2020-04-26 PROCEDURE — 25010000002 PROMETHAZINE PER 50 MG: Performed by: INTERNAL MEDICINE

## 2020-04-26 PROCEDURE — 93005 ELECTROCARDIOGRAM TRACING: CPT | Performed by: INTERNAL MEDICINE

## 2020-04-26 RX ORDER — ACETAMINOPHEN 325 MG/1
650 TABLET ORAL EVERY 4 HOURS PRN
Status: DISCONTINUED | OUTPATIENT
Start: 2020-04-26 | End: 2020-04-30 | Stop reason: HOSPADM

## 2020-04-26 RX ORDER — PROMETHAZINE HYDROCHLORIDE 25 MG/ML
12.5 INJECTION, SOLUTION INTRAMUSCULAR; INTRAVENOUS EVERY 4 HOURS PRN
Status: DISCONTINUED | OUTPATIENT
Start: 2020-04-26 | End: 2020-04-30 | Stop reason: HOSPADM

## 2020-04-26 RX ADMIN — ASPIRIN 325 MG: 325 TABLET ORAL at 08:08

## 2020-04-26 RX ADMIN — SODIUM CHLORIDE, PRESERVATIVE FREE 10 ML: 5 INJECTION INTRAVENOUS at 08:08

## 2020-04-26 RX ADMIN — LANSOPRAZOLE 30 MG: KIT at 08:08

## 2020-04-26 RX ADMIN — LABETALOL HYDROCHLORIDE 40 MG: 5 INJECTION, SOLUTION INTRAVENOUS at 05:27

## 2020-04-26 RX ADMIN — CHLORHEXIDINE GLUCONATE 15 ML: 1.2 RINSE ORAL at 08:09

## 2020-04-26 RX ADMIN — NITROGLYCERIN 1 INCH: 20 OINTMENT TOPICAL at 00:41

## 2020-04-26 RX ADMIN — POLYETHYLENE GLYCOL 3350 17 G: 17 POWDER, FOR SOLUTION ORAL at 08:08

## 2020-04-26 RX ADMIN — DOCUSATE SODIUM 100 MG: 50 LIQUID ORAL at 08:08

## 2020-04-26 RX ADMIN — ONDANSETRON 4 MG: 2 INJECTION INTRAMUSCULAR; INTRAVENOUS at 18:38

## 2020-04-26 RX ADMIN — ONDANSETRON 4 MG: 2 INJECTION INTRAMUSCULAR; INTRAVENOUS at 09:44

## 2020-04-26 RX ADMIN — Medication 176 MG: at 08:08

## 2020-04-26 RX ADMIN — NITROGLYCERIN 1 INCH: 20 OINTMENT TOPICAL at 11:26

## 2020-04-26 RX ADMIN — ACETAMINOPHEN 650 MG: 325 TABLET, FILM COATED ORAL at 14:10

## 2020-04-26 RX ADMIN — NITROGLYCERIN 1 INCH: 20 OINTMENT TOPICAL at 21:46

## 2020-04-26 RX ADMIN — NITROGLYCERIN 1 INCH: 20 OINTMENT TOPICAL at 05:38

## 2020-04-26 RX ADMIN — FUROSEMIDE 40 MG: 10 INJECTION, SOLUTION INTRAMUSCULAR; INTRAVENOUS at 08:08

## 2020-04-26 RX ADMIN — POTASSIUM CHLORIDE 20 MEQ: 1.5 POWDER, FOR SOLUTION ORAL at 08:08

## 2020-04-26 RX ADMIN — PROMETHAZINE HYDROCHLORIDE 12.5 MG: 25 INJECTION INTRAMUSCULAR; INTRAVENOUS at 21:07

## 2020-04-26 RX ADMIN — SODIUM CHLORIDE, PRESERVATIVE FREE 10 ML: 5 INJECTION INTRAVENOUS at 21:07

## 2020-04-26 RX ADMIN — PROMETHAZINE HYDROCHLORIDE 12.5 MG: 25 INJECTION INTRAMUSCULAR; INTRAVENOUS at 11:41

## 2020-04-27 ENCOUNTER — APPOINTMENT (OUTPATIENT)
Dept: CT IMAGING | Facility: HOSPITAL | Age: 65
End: 2020-04-27

## 2020-04-27 ENCOUNTER — TELEPHONE (OUTPATIENT)
Dept: FAMILY MEDICINE CLINIC | Facility: CLINIC | Age: 65
End: 2020-04-27

## 2020-04-27 PROBLEM — R50.9 FEVER: Status: RESOLVED | Noted: 2020-04-16 | Resolved: 2020-04-27

## 2020-04-27 PROBLEM — I82.409 ACUTE DEEP VEIN THROMBOSIS (DVT) OF LOWER EXTREMITY: Status: ACTIVE | Noted: 2020-04-27

## 2020-04-27 PROBLEM — I63.9 CEREBELLAR INFARCT: Status: ACTIVE | Noted: 2020-04-27

## 2020-04-27 PROBLEM — D72.829 LEUKOCYTOSIS: Status: RESOLVED | Noted: 2020-04-16 | Resolved: 2020-04-27

## 2020-04-27 LAB
ANION GAP SERPL CALCULATED.3IONS-SCNC: 12.6 MMOL/L (ref 5–15)
BH CV LOW VAS LEFT GASTRONEMIUS VESSEL: 1
BH CV LOW VAS LEFT PERONEAL VESSEL: 1
BH CV LOW VAS RIGHT PERONEAL VESSEL: 1
BH CV LOWER VASCULAR LEFT COMMON FEMORAL AUGMENT: NORMAL
BH CV LOWER VASCULAR LEFT COMMON FEMORAL COMPETENT: NORMAL
BH CV LOWER VASCULAR LEFT COMMON FEMORAL COMPRESS: NORMAL
BH CV LOWER VASCULAR LEFT COMMON FEMORAL PHASIC: NORMAL
BH CV LOWER VASCULAR LEFT COMMON FEMORAL SPONT: NORMAL
BH CV LOWER VASCULAR LEFT DISTAL FEMORAL COMPRESS: NORMAL
BH CV LOWER VASCULAR LEFT GASTRONEMIUS COMPRESS: NORMAL
BH CV LOWER VASCULAR LEFT GASTRONEMIUS THROMBUS: NORMAL
BH CV LOWER VASCULAR LEFT GREATER SAPH AK COMPRESS: NORMAL
BH CV LOWER VASCULAR LEFT GREATER SAPH BK COMPRESS: NORMAL
BH CV LOWER VASCULAR LEFT MID FEMORAL AUGMENT: NORMAL
BH CV LOWER VASCULAR LEFT MID FEMORAL COMPETENT: NORMAL
BH CV LOWER VASCULAR LEFT MID FEMORAL COMPRESS: NORMAL
BH CV LOWER VASCULAR LEFT MID FEMORAL PHASIC: NORMAL
BH CV LOWER VASCULAR LEFT MID FEMORAL SPONT: NORMAL
BH CV LOWER VASCULAR LEFT PERONEAL COMPRESS: NORMAL
BH CV LOWER VASCULAR LEFT PERONEAL THROMBUS: NORMAL
BH CV LOWER VASCULAR LEFT POPLITEAL AUGMENT: NORMAL
BH CV LOWER VASCULAR LEFT POPLITEAL COMPETENT: NORMAL
BH CV LOWER VASCULAR LEFT POPLITEAL COMPRESS: NORMAL
BH CV LOWER VASCULAR LEFT POPLITEAL PHASIC: NORMAL
BH CV LOWER VASCULAR LEFT POPLITEAL SPONT: NORMAL
BH CV LOWER VASCULAR LEFT POSTERIOR TIBIAL COMPRESS: NORMAL
BH CV LOWER VASCULAR LEFT PROFUNDA FEMORAL COMPRESS: NORMAL
BH CV LOWER VASCULAR LEFT PROXIMAL FEMORAL COMPRESS: NORMAL
BH CV LOWER VASCULAR LEFT SAPHENOFEMORAL JUNCTION COMPRESS: NORMAL
BH CV LOWER VASCULAR RIGHT COMMON FEMORAL AUGMENT: NORMAL
BH CV LOWER VASCULAR RIGHT COMMON FEMORAL COMPETENT: NORMAL
BH CV LOWER VASCULAR RIGHT COMMON FEMORAL COMPRESS: NORMAL
BH CV LOWER VASCULAR RIGHT COMMON FEMORAL PHASIC: NORMAL
BH CV LOWER VASCULAR RIGHT COMMON FEMORAL SPONT: NORMAL
BH CV LOWER VASCULAR RIGHT DISTAL FEMORAL COMPRESS: NORMAL
BH CV LOWER VASCULAR RIGHT GASTRONEMIUS COMPRESS: NORMAL
BH CV LOWER VASCULAR RIGHT GREATER SAPH AK COMPRESS: NORMAL
BH CV LOWER VASCULAR RIGHT GREATER SAPH BK COMPRESS: NORMAL
BH CV LOWER VASCULAR RIGHT MID FEMORAL AUGMENT: NORMAL
BH CV LOWER VASCULAR RIGHT MID FEMORAL COMPETENT: NORMAL
BH CV LOWER VASCULAR RIGHT MID FEMORAL COMPRESS: NORMAL
BH CV LOWER VASCULAR RIGHT MID FEMORAL PHASIC: NORMAL
BH CV LOWER VASCULAR RIGHT MID FEMORAL SPONT: NORMAL
BH CV LOWER VASCULAR RIGHT PERONEAL COMPRESS: NORMAL
BH CV LOWER VASCULAR RIGHT PERONEAL THROMBUS: NORMAL
BH CV LOWER VASCULAR RIGHT POPLITEAL AUGMENT: NORMAL
BH CV LOWER VASCULAR RIGHT POPLITEAL COMPETENT: NORMAL
BH CV LOWER VASCULAR RIGHT POPLITEAL COMPRESS: NORMAL
BH CV LOWER VASCULAR RIGHT POPLITEAL PHASIC: NORMAL
BH CV LOWER VASCULAR RIGHT POPLITEAL SPONT: NORMAL
BH CV LOWER VASCULAR RIGHT POSTERIOR TIBIAL COMPRESS: NORMAL
BH CV LOWER VASCULAR RIGHT PROFUNDA FEMORAL COMPRESS: NORMAL
BH CV LOWER VASCULAR RIGHT PROXIMAL FEMORAL COMPRESS: NORMAL
BH CV LOWER VASCULAR RIGHT SAPHENOFEMORAL JUNCTION COMPRESS: NORMAL
BUN BLD-MCNC: 25 MG/DL (ref 8–23)
BUN/CREAT SERPL: 28.7 (ref 7–25)
CALCIUM SPEC-SCNC: 9 MG/DL (ref 8.6–10.5)
CHLORIDE SERPL-SCNC: 97 MMOL/L (ref 98–107)
CO2 SERPL-SCNC: 24.4 MMOL/L (ref 22–29)
CREAT BLD-MCNC: 0.87 MG/DL (ref 0.76–1.27)
GFR SERPL CREATININE-BSD FRML MDRD: 88 ML/MIN/1.73
GLUCOSE BLD-MCNC: 113 MG/DL (ref 65–99)
GLUCOSE BLDC GLUCOMTR-MCNC: 106 MG/DL (ref 70–130)
GLUCOSE BLDC GLUCOMTR-MCNC: 109 MG/DL (ref 70–130)
GLUCOSE BLDC GLUCOMTR-MCNC: 114 MG/DL (ref 70–130)
GLUCOSE BLDC GLUCOMTR-MCNC: 125 MG/DL (ref 70–130)
POTASSIUM BLD-SCNC: 3.7 MMOL/L (ref 3.5–5.2)
POTASSIUM BLD-SCNC: 3.7 MMOL/L (ref 3.5–5.2)
SODIUM BLD-SCNC: 134 MMOL/L (ref 136–145)

## 2020-04-27 PROCEDURE — 0 IOPAMIDOL PER 1 ML: Performed by: INTERNAL MEDICINE

## 2020-04-27 PROCEDURE — 93458 L HRT ARTERY/VENTRICLE ANGIO: CPT | Performed by: INTERNAL MEDICINE

## 2020-04-27 PROCEDURE — 25010000002 HEPARIN (PORCINE) PER 1000 UNITS: Performed by: INTERNAL MEDICINE

## 2020-04-27 PROCEDURE — 80048 BASIC METABOLIC PNL TOTAL CA: CPT | Performed by: NURSE PRACTITIONER

## 2020-04-27 PROCEDURE — 70450 CT HEAD/BRAIN W/O DYE: CPT

## 2020-04-27 PROCEDURE — 25010000002 FUROSEMIDE PER 20 MG: Performed by: INTERNAL MEDICINE

## 2020-04-27 PROCEDURE — 99231 SBSQ HOSP IP/OBS SF/LOW 25: CPT | Performed by: NURSE PRACTITIONER

## 2020-04-27 PROCEDURE — 84132 ASSAY OF SERUM POTASSIUM: CPT | Performed by: INTERNAL MEDICINE

## 2020-04-27 PROCEDURE — C1769 GUIDE WIRE: HCPCS | Performed by: INTERNAL MEDICINE

## 2020-04-27 PROCEDURE — 94799 UNLISTED PULMONARY SVC/PX: CPT

## 2020-04-27 PROCEDURE — 82962 GLUCOSE BLOOD TEST: CPT

## 2020-04-27 PROCEDURE — 25010000002 ONDANSETRON PER 1 MG: Performed by: INTERNAL MEDICINE

## 2020-04-27 PROCEDURE — 99232 SBSQ HOSP IP/OBS MODERATE 35: CPT | Performed by: PHYSICIAN ASSISTANT

## 2020-04-27 PROCEDURE — B2151ZZ FLUOROSCOPY OF LEFT HEART USING LOW OSMOLAR CONTRAST: ICD-10-PCS | Performed by: INTERNAL MEDICINE

## 2020-04-27 PROCEDURE — 92610 EVALUATE SWALLOWING FUNCTION: CPT

## 2020-04-27 PROCEDURE — 99232 SBSQ HOSP IP/OBS MODERATE 35: CPT | Performed by: INTERNAL MEDICINE

## 2020-04-27 PROCEDURE — 4A023N7 MEASUREMENT OF CARDIAC SAMPLING AND PRESSURE, LEFT HEART, PERCUTANEOUS APPROACH: ICD-10-PCS | Performed by: INTERNAL MEDICINE

## 2020-04-27 PROCEDURE — B2111ZZ FLUOROSCOPY OF MULTIPLE CORONARY ARTERIES USING LOW OSMOLAR CONTRAST: ICD-10-PCS | Performed by: INTERNAL MEDICINE

## 2020-04-27 PROCEDURE — C1894 INTRO/SHEATH, NON-LASER: HCPCS | Performed by: INTERNAL MEDICINE

## 2020-04-27 PROCEDURE — 25010000002 PROMETHAZINE PER 50 MG: Performed by: INTERNAL MEDICINE

## 2020-04-27 PROCEDURE — 99232 SBSQ HOSP IP/OBS MODERATE 35: CPT | Performed by: PSYCHIATRY & NEUROLOGY

## 2020-04-27 RX ORDER — LIDOCAINE HYDROCHLORIDE 20 MG/ML
INJECTION, SOLUTION INFILTRATION; PERINEURAL AS NEEDED
Status: DISCONTINUED | OUTPATIENT
Start: 2020-04-27 | End: 2020-04-27 | Stop reason: HOSPADM

## 2020-04-27 RX ORDER — SODIUM CHLORIDE 9 MG/ML
INJECTION, SOLUTION INTRAVENOUS CONTINUOUS PRN
Status: COMPLETED | OUTPATIENT
Start: 2020-04-27 | End: 2020-04-27

## 2020-04-27 RX ORDER — ACETAMINOPHEN 325 MG/1
650 TABLET ORAL EVERY 4 HOURS PRN
Status: DISCONTINUED | OUTPATIENT
Start: 2020-04-27 | End: 2020-04-30 | Stop reason: HOSPADM

## 2020-04-27 RX ORDER — ONDANSETRON 2 MG/ML
INJECTION INTRAMUSCULAR; INTRAVENOUS AS NEEDED
Status: DISCONTINUED | OUTPATIENT
Start: 2020-04-27 | End: 2020-04-27 | Stop reason: HOSPADM

## 2020-04-27 RX ADMIN — CLOMIPRAMINE HYDROCHLORIDE 150 MG: 50 CAPSULE ORAL at 21:33

## 2020-04-27 RX ADMIN — PROMETHAZINE HYDROCHLORIDE 12.5 MG: 25 INJECTION INTRAMUSCULAR; INTRAVENOUS at 01:19

## 2020-04-27 RX ADMIN — ONDANSETRON 4 MG: 2 INJECTION INTRAMUSCULAR; INTRAVENOUS at 04:30

## 2020-04-27 RX ADMIN — NITROGLYCERIN 1 INCH: 20 OINTMENT TOPICAL at 17:57

## 2020-04-27 RX ADMIN — SODIUM CHLORIDE, PRESERVATIVE FREE 10 ML: 5 INJECTION INTRAVENOUS at 21:34

## 2020-04-27 RX ADMIN — POTASSIUM CHLORIDE 20 MEQ: 1.5 POWDER, FOR SOLUTION ORAL at 15:48

## 2020-04-27 RX ADMIN — SODIUM CHLORIDE, PRESERVATIVE FREE 10 ML: 5 INJECTION INTRAVENOUS at 09:06

## 2020-04-27 RX ADMIN — FUROSEMIDE 40 MG: 10 INJECTION, SOLUTION INTRAMUSCULAR; INTRAVENOUS at 09:05

## 2020-04-27 RX ADMIN — APIXABAN 5 MG: 5 TABLET, FILM COATED ORAL at 21:33

## 2020-04-27 RX ADMIN — ATORVASTATIN CALCIUM 80 MG: 20 TABLET, FILM COATED ORAL at 21:33

## 2020-04-27 RX ADMIN — NITROGLYCERIN 1 INCH: 20 OINTMENT TOPICAL at 05:28

## 2020-04-27 NOTE — TELEPHONE ENCOUNTER
CHANDU FROM [facility] WAS CALLING TO SEE IF GILSON HO WAS PATIENT'S PRIMARY CARE PHYSICIAN.    PLEASE ADVISE [phone]

## 2020-04-28 ENCOUNTER — APPOINTMENT (OUTPATIENT)
Dept: CARDIOLOGY | Facility: HOSPITAL | Age: 65
End: 2020-04-28

## 2020-04-28 ENCOUNTER — APPOINTMENT (OUTPATIENT)
Dept: GENERAL RADIOLOGY | Facility: HOSPITAL | Age: 65
End: 2020-04-28

## 2020-04-28 ENCOUNTER — APPOINTMENT (OUTPATIENT)
Dept: CT IMAGING | Facility: HOSPITAL | Age: 65
End: 2020-04-28

## 2020-04-28 DIAGNOSIS — I82.453 ACUTE DEEP VEIN THROMBOSIS (DVT) OF BOTH PERONEAL VEINS (HCC): Primary | ICD-10-CM

## 2020-04-28 DIAGNOSIS — I82.409 ACUTE DEEP VEIN THROMBOSIS (DVT) OF LOWER EXTREMITY, UNSPECIFIED LATERALITY, UNSPECIFIED VEIN (HCC): ICD-10-CM

## 2020-04-28 PROBLEM — D64.9 ANEMIA: Status: ACTIVE | Noted: 2020-04-28

## 2020-04-28 LAB
ANION GAP SERPL CALCULATED.3IONS-SCNC: 11.8 MMOL/L (ref 5–15)
BH CV ECHO MEAS - AO ROOT AREA (BSA CORRECTED): 2.2
BH CV ECHO MEAS - AO ROOT AREA: 16.6 CM^2
BH CV ECHO MEAS - AO ROOT DIAM: 4.6 CM
BH CV ECHO MEAS - ASC AORTA: 3.7 CM
BH CV ECHO MEAS - BSA(HAYCOCK): 2.1 M^2
BH CV ECHO MEAS - BSA: 2.1 M^2
BH CV ECHO MEAS - BZI_BMI: 25.1 KILOGRAMS/M^2
BH CV ECHO MEAS - BZI_METRIC_HEIGHT: 182.9 CM
BH CV ECHO MEAS - BZI_METRIC_WEIGHT: 83.9 KG
BH CV ECHO MEAS - EDV(CUBED): 97.3 ML
BH CV ECHO MEAS - EDV(MOD-SP2): 114 ML
BH CV ECHO MEAS - EDV(MOD-SP4): 125 ML
BH CV ECHO MEAS - EDV(TEICH): 97.3 ML
BH CV ECHO MEAS - EF(CUBED): 74.9 %
BH CV ECHO MEAS - EF(MOD-BP): 62 %
BH CV ECHO MEAS - EF(MOD-SP2): 63.2 %
BH CV ECHO MEAS - EF(MOD-SP4): 61.6 %
BH CV ECHO MEAS - EF(TEICH): 66.9 %
BH CV ECHO MEAS - ESV(CUBED): 24.4 ML
BH CV ECHO MEAS - ESV(MOD-SP2): 42 ML
BH CV ECHO MEAS - ESV(MOD-SP4): 48 ML
BH CV ECHO MEAS - ESV(TEICH): 32.2 ML
BH CV ECHO MEAS - FS: 37 %
BH CV ECHO MEAS - IVS/LVPW: 1.1
BH CV ECHO MEAS - IVSD: 1 CM
BH CV ECHO MEAS - LV DIASTOLIC VOL/BSA (35-75): 60.7 ML/M^2
BH CV ECHO MEAS - LV MASS(C)D: 148.1 GRAMS
BH CV ECHO MEAS - LV MASS(C)DI: 71.9 GRAMS/M^2
BH CV ECHO MEAS - LV SYSTOLIC VOL/BSA (12-30): 23.3 ML/M^2
BH CV ECHO MEAS - LVIDD: 4.6 CM
BH CV ECHO MEAS - LVIDS: 2.9 CM
BH CV ECHO MEAS - LVLD AP2: 9.2 CM
BH CV ECHO MEAS - LVLD AP4: 9 CM
BH CV ECHO MEAS - LVLS AP2: 7.5 CM
BH CV ECHO MEAS - LVLS AP4: 7.7 CM
BH CV ECHO MEAS - LVPWD: 0.9 CM
BH CV ECHO MEAS - RVOT AREA: 2.8 CM^2
BH CV ECHO MEAS - RVOT DIAM: 1.9 CM
BH CV ECHO MEAS - SI(CUBED): 35.4 ML/M^2
BH CV ECHO MEAS - SI(MOD-SP2): 34.9 ML/M^2
BH CV ECHO MEAS - SI(MOD-SP4): 37.4 ML/M^2
BH CV ECHO MEAS - SI(TEICH): 31.6 ML/M^2
BH CV ECHO MEAS - SV(CUBED): 72.9 ML
BH CV ECHO MEAS - SV(MOD-SP2): 72 ML
BH CV ECHO MEAS - SV(MOD-SP4): 77 ML
BH CV ECHO MEAS - SV(TEICH): 65.1 ML
BH CV VAS BP RIGHT ARM: NORMAL MMHG
BUN BLD-MCNC: 29 MG/DL (ref 8–23)
BUN/CREAT SERPL: 39.2 (ref 7–25)
CALCIUM SPEC-SCNC: 9 MG/DL (ref 8.6–10.5)
CHLORIDE SERPL-SCNC: 93 MMOL/L (ref 98–107)
CO2 SERPL-SCNC: 25.2 MMOL/L (ref 22–29)
CREAT BLD-MCNC: 0.74 MG/DL (ref 0.76–1.27)
DEPRECATED RDW RBC AUTO: 40.7 FL (ref 37–54)
ERYTHROCYTE [DISTWIDTH] IN BLOOD BY AUTOMATED COUNT: 14 % (ref 12.3–15.4)
GFR SERPL CREATININE-BSD FRML MDRD: 105 ML/MIN/1.73
GLUCOSE BLD-MCNC: 105 MG/DL (ref 65–99)
GLUCOSE BLDC GLUCOMTR-MCNC: 103 MG/DL (ref 70–130)
GLUCOSE BLDC GLUCOMTR-MCNC: 107 MG/DL (ref 70–130)
GLUCOSE BLDC GLUCOMTR-MCNC: 108 MG/DL (ref 70–130)
GLUCOSE BLDC GLUCOMTR-MCNC: 113 MG/DL (ref 70–130)
GLUCOSE BLDC GLUCOMTR-MCNC: 116 MG/DL (ref 70–130)
HCT VFR BLD AUTO: 38.3 % (ref 37.5–51)
HGB BLD-MCNC: 12.6 G/DL (ref 13–17.7)
MAXIMAL PREDICTED HEART RATE: 153 BPM
MCH RBC QN AUTO: 26.9 PG (ref 26.6–33)
MCHC RBC AUTO-ENTMCNC: 32.9 G/DL (ref 31.5–35.7)
MCV RBC AUTO: 81.7 FL (ref 79–97)
PLATELET # BLD AUTO: 490 10*3/MM3 (ref 140–450)
PMV BLD AUTO: 10.7 FL (ref 6–12)
POTASSIUM BLD-SCNC: 3.2 MMOL/L (ref 3.5–5.2)
RBC # BLD AUTO: 4.69 10*6/MM3 (ref 4.14–5.8)
SODIUM BLD-SCNC: 130 MMOL/L (ref 136–145)
STRESS TARGET HR: 130 BPM
TSH SERPL DL<=0.05 MIU/L-ACNC: 3.51 UIU/ML (ref 0.27–4.2)
WBC NRBC COR # BLD: 10.92 10*3/MM3 (ref 3.4–10.8)

## 2020-04-28 PROCEDURE — 93325 DOPPLER ECHO COLOR FLOW MAPG: CPT | Performed by: INTERNAL MEDICINE

## 2020-04-28 PROCEDURE — 94799 UNLISTED PULMONARY SVC/PX: CPT

## 2020-04-28 PROCEDURE — 85670 THROMBIN TIME PLASMA: CPT | Performed by: NURSE PRACTITIONER

## 2020-04-28 PROCEDURE — 85027 COMPLETE CBC AUTOMATED: CPT | Performed by: INTERNAL MEDICINE

## 2020-04-28 PROCEDURE — 93005 ELECTROCARDIOGRAM TRACING: CPT | Performed by: INTERNAL MEDICINE

## 2020-04-28 PROCEDURE — 93308 TTE F-UP OR LMTD: CPT

## 2020-04-28 PROCEDURE — 99232 SBSQ HOSP IP/OBS MODERATE 35: CPT | Performed by: INTERNAL MEDICINE

## 2020-04-28 PROCEDURE — 97110 THERAPEUTIC EXERCISES: CPT

## 2020-04-28 PROCEDURE — 93308 TTE F-UP OR LMTD: CPT | Performed by: INTERNAL MEDICINE

## 2020-04-28 PROCEDURE — 85613 RUSSELL VIPER VENOM DILUTED: CPT | Performed by: NURSE PRACTITIONER

## 2020-04-28 PROCEDURE — 85732 THROMBOPLASTIN TIME PARTIAL: CPT | Performed by: NURSE PRACTITIONER

## 2020-04-28 PROCEDURE — 86146 BETA-2 GLYCOPROTEIN ANTIBODY: CPT | Performed by: NURSE PRACTITIONER

## 2020-04-28 PROCEDURE — 74230 X-RAY XM SWLNG FUNCJ C+: CPT

## 2020-04-28 PROCEDURE — 70450 CT HEAD/BRAIN W/O DYE: CPT

## 2020-04-28 PROCEDURE — 85730 THROMBOPLASTIN TIME PARTIAL: CPT | Performed by: NURSE PRACTITIONER

## 2020-04-28 PROCEDURE — 85598 HEXAGNAL PHOSPH PLTLT NEUTRL: CPT | Performed by: NURSE PRACTITIONER

## 2020-04-28 PROCEDURE — 99222 1ST HOSP IP/OBS MODERATE 55: CPT | Performed by: INTERNAL MEDICINE

## 2020-04-28 PROCEDURE — 84443 ASSAY THYROID STIM HORMONE: CPT | Performed by: NURSE PRACTITIONER

## 2020-04-28 PROCEDURE — 86147 CARDIOLIPIN ANTIBODY EA IG: CPT | Performed by: NURSE PRACTITIONER

## 2020-04-28 PROCEDURE — 85610 PROTHROMBIN TIME: CPT | Performed by: NURSE PRACTITIONER

## 2020-04-28 PROCEDURE — 82962 GLUCOSE BLOOD TEST: CPT

## 2020-04-28 PROCEDURE — 97162 PT EVAL MOD COMPLEX 30 MIN: CPT

## 2020-04-28 PROCEDURE — 80048 BASIC METABOLIC PNL TOTAL CA: CPT | Performed by: INTERNAL MEDICINE

## 2020-04-28 PROCEDURE — 93325 DOPPLER ECHO COLOR FLOW MAPG: CPT

## 2020-04-28 PROCEDURE — 92611 MOTION FLUOROSCOPY/SWALLOW: CPT

## 2020-04-28 PROCEDURE — 93010 ELECTROCARDIOGRAM REPORT: CPT | Performed by: INTERNAL MEDICINE

## 2020-04-28 RX ORDER — CLOMIPRAMINE HYDROCHLORIDE 50 MG/1
150 CAPSULE ORAL NIGHTLY
Status: DISCONTINUED | OUTPATIENT
Start: 2020-04-28 | End: 2020-04-30 | Stop reason: HOSPADM

## 2020-04-28 RX ORDER — FUROSEMIDE 20 MG/1
20 TABLET ORAL DAILY
Status: DISCONTINUED | OUTPATIENT
Start: 2020-04-28 | End: 2020-04-30 | Stop reason: HOSPADM

## 2020-04-28 RX ORDER — DOCUSATE SODIUM 100 MG/1
100 CAPSULE, LIQUID FILLED ORAL DAILY
Status: DISCONTINUED | OUTPATIENT
Start: 2020-04-28 | End: 2020-04-30 | Stop reason: HOSPADM

## 2020-04-28 RX ORDER — SENNA AND DOCUSATE SODIUM 50; 8.6 MG/1; MG/1
2 TABLET, FILM COATED ORAL NIGHTLY
Status: DISCONTINUED | OUTPATIENT
Start: 2020-04-28 | End: 2020-04-30 | Stop reason: HOSPADM

## 2020-04-28 RX ORDER — POLYETHYLENE GLYCOL 3350 17 G/17G
17 POWDER, FOR SOLUTION ORAL 2 TIMES DAILY PRN
Status: DISCONTINUED | OUTPATIENT
Start: 2020-04-28 | End: 2020-04-30 | Stop reason: HOSPADM

## 2020-04-28 RX ORDER — LANSOPRAZOLE
30 KIT
Status: DISCONTINUED | OUTPATIENT
Start: 2020-04-29 | End: 2020-04-30 | Stop reason: HOSPADM

## 2020-04-28 RX ADMIN — BARIUM SULFATE 55 ML: 0.81 POWDER, FOR SUSPENSION ORAL at 08:13

## 2020-04-28 RX ADMIN — BARIUM SULFATE 50 ML: 400 SUSPENSION ORAL at 08:13

## 2020-04-28 RX ADMIN — APIXABAN 5 MG: 5 TABLET, FILM COATED ORAL at 11:09

## 2020-04-28 RX ADMIN — NITROGLYCERIN 1 INCH: 20 OINTMENT TOPICAL at 05:47

## 2020-04-28 RX ADMIN — APIXABAN 5 MG: 5 TABLET, FILM COATED ORAL at 21:27

## 2020-04-28 RX ADMIN — Medication 176 MG: at 11:13

## 2020-04-28 RX ADMIN — ATORVASTATIN CALCIUM 80 MG: 20 TABLET, FILM COATED ORAL at 21:27

## 2020-04-28 RX ADMIN — NITROGLYCERIN 1 INCH: 20 OINTMENT TOPICAL at 18:45

## 2020-04-28 RX ADMIN — CLOMIPRAMINE HYDROCHLORIDE 150 MG: 50 CAPSULE ORAL at 22:15

## 2020-04-28 RX ADMIN — FUROSEMIDE 20 MG: 20 TABLET ORAL at 13:52

## 2020-04-28 RX ADMIN — BARIUM SULFATE 4 ML: 980 POWDER, FOR SUSPENSION ORAL at 08:13

## 2020-04-28 RX ADMIN — NITROGLYCERIN 1 INCH: 20 OINTMENT TOPICAL at 11:10

## 2020-04-28 RX ADMIN — POTASSIUM CHLORIDE 40 MEQ: 10 CAPSULE, COATED, EXTENDED RELEASE ORAL at 21:27

## 2020-04-28 RX ADMIN — POTASSIUM CHLORIDE 40 MEQ: 10 CAPSULE, COATED, EXTENDED RELEASE ORAL at 11:10

## 2020-04-28 RX ADMIN — SODIUM CHLORIDE, PRESERVATIVE FREE 10 ML: 5 INJECTION INTRAVENOUS at 21:28

## 2020-04-28 RX ADMIN — NITROGLYCERIN 1 INCH: 20 OINTMENT TOPICAL at 23:26

## 2020-04-28 RX ADMIN — LANSOPRAZOLE 30 MG: KIT at 11:09

## 2020-04-28 RX ADMIN — DOCUSATE SODIUM 100 MG: 100 CAPSULE, LIQUID FILLED ORAL at 21:27

## 2020-04-28 RX ADMIN — NITROGLYCERIN 1 INCH: 20 OINTMENT TOPICAL at 00:59

## 2020-04-28 RX ADMIN — SODIUM CHLORIDE, PRESERVATIVE FREE 10 ML: 5 INJECTION INTRAVENOUS at 11:10

## 2020-04-28 RX ADMIN — DOCUSATE SODIUM 100 MG: 50 LIQUID ORAL at 11:09

## 2020-04-29 LAB
ANION GAP SERPL CALCULATED.3IONS-SCNC: 12 MMOL/L (ref 5–15)
BUN BLD-MCNC: 26 MG/DL (ref 8–23)
BUN/CREAT SERPL: 31.3 (ref 7–25)
CALCIUM SPEC-SCNC: 8.8 MG/DL (ref 8.6–10.5)
CHLORIDE SERPL-SCNC: 99 MMOL/L (ref 98–107)
CO2 SERPL-SCNC: 24 MMOL/L (ref 22–29)
CREAT BLD-MCNC: 0.83 MG/DL (ref 0.76–1.27)
DEPRECATED RDW RBC AUTO: 42 FL (ref 37–54)
ERYTHROCYTE [DISTWIDTH] IN BLOOD BY AUTOMATED COUNT: 14.4 % (ref 12.3–15.4)
F5 GENE MUT ANL BLD/T: NORMAL
FACTOR II, DNA ANALYSIS: NORMAL
FOLATE SERPL-MCNC: 8.41 NG/ML (ref 4.78–24.2)
GFR SERPL CREATININE-BSD FRML MDRD: 92 ML/MIN/1.73
GLUCOSE BLD-MCNC: 107 MG/DL (ref 65–99)
HCT VFR BLD AUTO: 38.2 % (ref 37.5–51)
HGB BLD-MCNC: 12.7 G/DL (ref 13–17.7)
MCH RBC QN AUTO: 27.3 PG (ref 26.6–33)
MCHC RBC AUTO-ENTMCNC: 33.2 G/DL (ref 31.5–35.7)
MCV RBC AUTO: 82 FL (ref 79–97)
PLATELET # BLD AUTO: 474 10*3/MM3 (ref 140–450)
PMV BLD AUTO: 10.5 FL (ref 6–12)
POTASSIUM BLD-SCNC: 3.4 MMOL/L (ref 3.5–5.2)
RBC # BLD AUTO: 4.66 10*6/MM3 (ref 4.14–5.8)
SODIUM BLD-SCNC: 135 MMOL/L (ref 136–145)
WBC NRBC COR # BLD: 9.61 10*3/MM3 (ref 3.4–10.8)

## 2020-04-29 PROCEDURE — 85303 CLOT INHIBIT PROT C ACTIVITY: CPT | Performed by: INTERNAL MEDICINE

## 2020-04-29 PROCEDURE — 86146 BETA-2 GLYCOPROTEIN ANTIBODY: CPT | Performed by: INTERNAL MEDICINE

## 2020-04-29 PROCEDURE — 81240 F2 GENE: CPT | Performed by: INTERNAL MEDICINE

## 2020-04-29 PROCEDURE — 85306 CLOT INHIBIT PROT S FREE: CPT | Performed by: INTERNAL MEDICINE

## 2020-04-29 PROCEDURE — 85300 ANTITHROMBIN III ACTIVITY: CPT | Performed by: INTERNAL MEDICINE

## 2020-04-29 PROCEDURE — 86148 ANTI-PHOSPHOLIPID ANTIBODY: CPT | Performed by: INTERNAL MEDICINE

## 2020-04-29 PROCEDURE — 97535 SELF CARE MNGMENT TRAINING: CPT | Performed by: OCCUPATIONAL THERAPIST

## 2020-04-29 PROCEDURE — 97166 OT EVAL MOD COMPLEX 45 MIN: CPT | Performed by: OCCUPATIONAL THERAPIST

## 2020-04-29 PROCEDURE — 80048 BASIC METABOLIC PNL TOTAL CA: CPT | Performed by: NURSE PRACTITIONER

## 2020-04-29 PROCEDURE — 85027 COMPLETE CBC AUTOMATED: CPT | Performed by: NURSE PRACTITIONER

## 2020-04-29 PROCEDURE — 81241 F5 GENE: CPT | Performed by: INTERNAL MEDICINE

## 2020-04-29 PROCEDURE — 97112 NEUROMUSCULAR REEDUCATION: CPT

## 2020-04-29 PROCEDURE — 82746 ASSAY OF FOLIC ACID SERUM: CPT | Performed by: INTERNAL MEDICINE

## 2020-04-29 RX ADMIN — POTASSIUM CHLORIDE 40 MEQ: 1.5 POWDER, FOR SOLUTION ORAL at 10:02

## 2020-04-29 RX ADMIN — NITROGLYCERIN 1 INCH: 20 OINTMENT TOPICAL at 05:41

## 2020-04-29 RX ADMIN — FUROSEMIDE 20 MG: 20 TABLET ORAL at 10:00

## 2020-04-29 RX ADMIN — NITROGLYCERIN 1 INCH: 20 OINTMENT TOPICAL at 17:07

## 2020-04-29 RX ADMIN — APIXABAN 5 MG: 5 TABLET, FILM COATED ORAL at 09:59

## 2020-04-29 RX ADMIN — SODIUM CHLORIDE, PRESERVATIVE FREE 10 ML: 5 INJECTION INTRAVENOUS at 22:34

## 2020-04-29 RX ADMIN — APIXABAN 5 MG: 5 TABLET, FILM COATED ORAL at 22:32

## 2020-04-29 RX ADMIN — NITROGLYCERIN 1 INCH: 20 OINTMENT TOPICAL at 13:00

## 2020-04-29 RX ADMIN — POTASSIUM CHLORIDE 20 MEQ: 1.5 POWDER, FOR SOLUTION ORAL at 10:02

## 2020-04-29 RX ADMIN — LANSOPRAZOLE 30 MG: KIT at 17:25

## 2020-04-29 RX ADMIN — CLOMIPRAMINE HYDROCHLORIDE 150 MG: 50 CAPSULE ORAL at 22:32

## 2020-04-29 RX ADMIN — LANSOPRAZOLE 30 MG: KIT at 06:43

## 2020-04-29 RX ADMIN — ATORVASTATIN CALCIUM 80 MG: 20 TABLET, FILM COATED ORAL at 22:33

## 2020-04-30 ENCOUNTER — HOSPITAL ENCOUNTER (INPATIENT)
Facility: HOSPITAL | Age: 65
End: 2020-04-30
Attending: PHYSICAL MEDICINE & REHABILITATION | Admitting: PHYSICAL MEDICINE & REHABILITATION

## 2020-04-30 ENCOUNTER — READMISSION MANAGEMENT (OUTPATIENT)
Dept: CALL CENTER | Facility: HOSPITAL | Age: 65
End: 2020-04-30

## 2020-04-30 ENCOUNTER — APPOINTMENT (OUTPATIENT)
Dept: CARDIOLOGY | Facility: HOSPITAL | Age: 65
End: 2020-04-30

## 2020-04-30 VITALS
WEIGHT: 180.2 LBS | DIASTOLIC BLOOD PRESSURE: 83 MMHG | HEIGHT: 72 IN | OXYGEN SATURATION: 91 % | RESPIRATION RATE: 16 BRPM | BODY MASS INDEX: 24.41 KG/M2 | HEART RATE: 86 BPM | TEMPERATURE: 97.8 F | SYSTOLIC BLOOD PRESSURE: 140 MMHG

## 2020-04-30 LAB
AT III PPP CHRO-ACNC: 103 % (ref 90–134)
GLUCOSE BLDC GLUCOMTR-MCNC: 107 MG/DL (ref 70–130)
PROT C ACT/NOR PPP: 109 % (ref 86–163)
PROT S ACT/NOR PPP: 95 % (ref 70–127)
PS IGA SER-ACNC: 5 APS IGA (ref 0–20)
PS IGG SER-ACNC: 10 GPS IGG (ref 0–11)
PS IGM SER-ACNC: 9 MPS IGM (ref 0–25)

## 2020-04-30 PROCEDURE — 82962 GLUCOSE BLOOD TEST: CPT

## 2020-04-30 PROCEDURE — 0296T HC EXT ECG > 48HR TO 21 DAY RCRD W/CONECT INTL RCRD: CPT

## 2020-04-30 RX ORDER — PANTOPRAZOLE SODIUM 40 MG/1
40 TABLET, DELAYED RELEASE ORAL 2 TIMES DAILY
Qty: 60 TABLET | Refills: 0 | Status: SHIPPED | OUTPATIENT
Start: 2020-04-30 | End: 2020-05-12 | Stop reason: SDUPTHER

## 2020-04-30 RX ORDER — PSEUDOEPHEDRINE HCL 30 MG
100 TABLET ORAL DAILY
Start: 2020-05-01 | End: 2020-05-06

## 2020-04-30 RX ORDER — FUROSEMIDE 20 MG/1
20 TABLET ORAL DAILY
Qty: 30 TABLET | Refills: 0 | Status: SHIPPED | OUTPATIENT
Start: 2020-05-01 | End: 2020-05-20

## 2020-04-30 RX ORDER — ACETAMINOPHEN 325 MG/1
650 TABLET ORAL EVERY 4 HOURS PRN
Start: 2020-04-30 | End: 2020-05-06

## 2020-04-30 RX ORDER — ATORVASTATIN CALCIUM 80 MG/1
80 TABLET, FILM COATED ORAL NIGHTLY
Qty: 30 TABLET | Refills: 1 | Status: SHIPPED | OUTPATIENT
Start: 2020-04-30 | End: 2020-07-01

## 2020-04-30 RX ORDER — POTASSIUM CHLORIDE 1.5 G/1.77G
20 POWDER, FOR SOLUTION ORAL DAILY
Qty: 30 PACKET | Refills: 0 | Status: SHIPPED | OUTPATIENT
Start: 2020-05-01 | End: 2020-05-20

## 2020-04-30 RX ORDER — POLYETHYLENE GLYCOL 3350 17 G/17G
17 POWDER, FOR SOLUTION ORAL 2 TIMES DAILY PRN
Start: 2020-04-30 | End: 2020-05-06

## 2020-04-30 RX ADMIN — FUROSEMIDE 20 MG: 20 TABLET ORAL at 08:37

## 2020-04-30 RX ADMIN — NITROGLYCERIN 1 INCH: 20 OINTMENT TOPICAL at 06:50

## 2020-04-30 RX ADMIN — DOCUSATE SODIUM 100 MG: 100 CAPSULE, LIQUID FILLED ORAL at 08:47

## 2020-04-30 RX ADMIN — NITROGLYCERIN 1 INCH: 20 OINTMENT TOPICAL at 00:29

## 2020-04-30 RX ADMIN — LANSOPRAZOLE 30 MG: KIT at 06:50

## 2020-04-30 RX ADMIN — POTASSIUM CHLORIDE 20 MEQ: 1.5 POWDER, FOR SOLUTION ORAL at 08:37

## 2020-04-30 RX ADMIN — ACETAMINOPHEN 650 MG: 325 TABLET, FILM COATED ORAL at 04:42

## 2020-04-30 RX ADMIN — APIXABAN 5 MG: 5 TABLET, FILM COATED ORAL at 08:37

## 2020-04-30 RX ADMIN — SODIUM CHLORIDE, PRESERVATIVE FREE 10 ML: 5 INJECTION INTRAVENOUS at 08:38

## 2020-04-30 NOTE — OUTREACH NOTE
Prep Survey      Responses   [facility] facility patient discharged from?  [facility]   Is LACE score < 7 ?  No   Eligibility  [facility]   Date of Admission  04/16/20   Date of Discharge  04/30/20   Discharge Disposition  Home or Self Care   Discharge diagnosis  [facility]   COVID-19 Test Status  Negative   Does the patient have one of the following disease processes/diagnoses(primary or secondary)?  COPD/Pneumonia   Does the patient have Home health ordered?  Yes   What is the Home health agency?   [facility]   Is there a DME ordered?  Yes   What DME was ordered?  Rolling walker through [facility]   Prep survey completed?  Yes          Liliana June RN

## 2020-05-01 ENCOUNTER — TRANSITIONAL CARE MANAGEMENT TELEPHONE ENCOUNTER (OUTPATIENT)
Dept: CALL CENTER | Facility: HOSPITAL | Age: 65
End: 2020-05-01

## 2020-05-01 LAB
B2 GLYCOPROT1 IGA SER-ACNC: 18 GPI IGA UNITS (ref 0–25)
B2 GLYCOPROT1 IGG SER-ACNC: 13 GPI IGG UNITS (ref 0–20)
B2 GLYCOPROT1 IGM SER-ACNC: <9 GPI IGM UNITS (ref 0–32)

## 2020-05-01 NOTE — PAYOR COMM NOTE
"Waldo Youssef (65 y.o. Male)                   ATTENTION;;   DC SUMMARY FOR REVIEW CASE REF #[id number]         Date of Birth Social Security Number Address Home Phone MRN    1955  [address] [facility] [address] [phone] [id number]    Protestant Marital Status          Unknown        Admission Date Admission Type Admitting Provider Attending Provider Department, Room/Bed    20 Emergency Chidi Alfaro MD  [address], N0/1    Discharge Date Discharge Disposition Discharge Destination        2020 Home or Self Care              Attending Provider:  (none)   Allergies:  No Known Allergies    Isolation:  None   Infection:  None   Code Status:  Prior    Ht:  182.9 cm (72\")   Wt:  81.7 kg (180 lb 3.2 oz)    Admission Cmt:  None   Principal Problem:  Aspiration pneumonia (CMS/[facility]) [J69.0]                 Active Insurance as of 2020     Primary Coverage     Payor Plan Insurance Group Employer/Plan Group    AETNA MEDICARE REPLACEMENT AETNA MEDICARE REPLACEMENT [id number]     Payor Plan Address Payor Plan Phone Number Payor Plan Fax Number Effective Dates    [address] [phone]  3/1/2020 - None Entered    [facility] [id number]       Subscriber Name Subscriber Birth Date Member ID       WALDO YOUSSEF 1955 [id number]                 Emergency Contacts      (Rel.) Home Phone Work Phone Mobile Phone    Kelsey Youssef (Spouse) [phone] -- [phone]    Ángel Youssef (Son) -- -- [phone]    Rocky Youssef (Son) -- -- [phone]               Discharge Summary      Lebron Addison MD at 20 1258                 NAME: Waldo Youssef ADMIT: 2020   : 1955  PCP: Yuliya Condon APRN    MRN: [id number] LOS: 14 days   AGE/SEX: 65 y.o. male  ROOM: [facility]     Date of Admission:  2020  Date of Discharge:  2020    PCP: Yuliya Condon APRN    CHIEF COMPLAINT  Abdominal " Pain      DISCHARGE DIAGNOSIS  Active Hospital Problems    Diagnosis  POA   • **Aspiration pneumonia (CMS/HCC) [J69.0]  Yes   • Anemia [D64.9]  Yes   • Acute deep vein thrombosis (DVT) of lower extremity (CMS/HCC) [I82.409]  Yes   • Cerebellar infarct (CMS/HCC) [I63.9]  Yes   • Esophageal tear [S11.21XA]  Yes   • Hypophosphatemia [E83.39]  Unknown   • Acute urinary retention [R33.8]  Clinically Undetermined   • Epigastric pain [R10.13]  Yes   • Abnormal CT of the abdomen [R93.5]  Yes   • Acute kidney injury superimposed on chronic kidney disease (CMS/HCC) [N17.9, N18.9]  Yes   • Acute respiratory failure with hypoxia (CMS/HCC) [J96.01]  Yes   • Sepsis associated hypotension (CMS/HCC) [A41.9, I95.9]  Yes   • Dysphagia [R13.10]  Yes      Resolved Hospital Problems    Diagnosis Date Resolved POA   • Leukocytosis [D72.829] 04/27/2020 Yes   • Fever [R50.9] 04/27/2020 Unknown       SECONDARY DIAGNOSES  Past Medical History:   Diagnosis Date   • Anxiety    • Depression    • Hx of radiation therapy    • Kidney stone    • OCD (obsessive compulsive disorder)    • Prostate cancer (CMS/HCC)     prostate       CONSULTS   Neurology  GI  Thoracic Surgery  Pulmonary  Cardiology  ODETTE  Oncology    HOSPITAL COURSE  Patient is a 65 y.o. male presented to [facility] complaining of    C/o food stuck in lower esophagus, epigastric pain.  He had complicated course in the hospital and required transfer to the ICU for aspiration pneumonia with respiratory failure requiring intubation. Eventually he was extubated and has done well.     He was seen by GI and thoracic surgery and had EGD with ulceration and mucosal breaks involving at least 75% of the esophageal circumference with esophagitis but no recent bleeding. Biopsy was taken. Pathology evaluation reported no evidence of malignancy. There was moderate active inflammation with eosinophil.  He will continue PPI treatment and follow-up with GI as an outpatient and likely will  need repeat EGD.  He may also needing EUS with biopsy.    This patient seems also had stroke evident on brain MRI examination, at subacute infarction of the lower right cerebellum. There was also post infarct enhancement and evidence of small petechial hemorrhage.  This was a cryptogenic stroke.  Echo had a negative saline test.  He did not have any evidence of A. fib while in the hospital but should have a ZIO patch at discharge and potential LINQ if zio is negative.  He cannot have a  right now due to COVID-19 pandemic.    4/27 Cardiac Cath: Noncritical disease involving the left main and LAD as well as the RCA, normal LV function,    This patient had bilateral venous duplex study in the lower extremities on 04/26/2020 and this study reported right leg acute venous thrombosis in the peroneal vein and left leg acute deep vein thrombosis involving the peroneal and gastrocnemius veins. All other veins were normal bilaterally. The patient currently has been on Eliquis anticoagulation for both the DVT as well as the stroke.    Patient is improved and is wishing for discharge.  We were thinking that he may go to acute rehab but the patient is feeling better working with physical therapy and is adamant on going home with home health with physical therapy.      DIAGNOSTICS    4/23 2D echo: LV function low normal, EF 52%, hypokinetic apical septal and mid inferoseptal, grade 1 with high LAP LV diastolic dysfunction, normal LA size and volume noted, no AV stenosis present.  4/23 CTA Chest: no PE noted.   4/24 MRI brain W/WO: Acute/subacute lower right cerebellar, cerebellar vermis, medial left cerebellum with apparent hyperemia/enhancement and evidence of small petechial hemorrhage, moderate to severe small vessel disease.  4/24 CTA H/N: Acute/Subacute right cerebellar infarct, punctate hypodensities at the lower aspect of the infarct consistent with a minimal petechial hemorrhage, no normally opacified right anterior  inferior cerebellar artery or left posterior inferior cerebral artery is noted suspect extremely diminutive size or occlusion of arteries (congenital or flow related), bilateral pleural effusions and pulmonary opacities.  4/25 CTH WO: Right cerebellar infarct again noted with mild mass-effect, no evidence of hemorrhagic conversion, evidence of small vessel disease.  4/27 CTH WO: Evolving area of right cerebellar infarction, no evidence of hemorrhagic conversion.  4/28 CTH WO: Acute to subacute right cerebellar infarct without evidence of hemorrhagic conversion.  VFSS: Mild pharyngeal dysphasia characterized by reduced hyolaryngeal elevation/excursion, mild upper esophageal retention, SLP recommending regular and thins with upright positioning after meals.  4/26 Bilateral LE Duplex: Acute RLE DVT in the peroneal, acute LLE DVT in the peroneal and gastrocnemius.  4/27 Cardiac Cath: Noncritical disease involving the left main and LAD as well as the RCA, normal LV function, abnormal electrocardiogram.  4/28 2D Echo with bubble study: saline test negative.  Labs: A1c 5.30, LDL 81, B12 541, COVID-19 negative x2, CRP 7.17/4.27/4.73, U/A 3-5 WBC trace leuks, resp panel negative, resp cx negative, BC NGTD,    PHYSICAL EXAM  Objective    Alert  nad  No resp distress  CN intact  Pleasant, wishing for discharge to home.     CONDITION ON DISCHARGE  Stable.      DISCHARGE DISPOSITION   Home or Self Care      DISCHARGE MEDICATIONS       Your medication list      START taking these medications      Instructions Last Dose Given Next Dose Due   acetaminophen 325 MG tablet  Commonly known as:  TYLENOL      Take 2 tablets by mouth Every 4 (Four) Hours As Needed for Mild Pain  or Moderate Pain .       apixaban 5 MG tablet tablet  Commonly known as:  ELIQUIS      Take 1 tablet by mouth Every 12 (Twelve) Hours. Indications: Other - full anticoagulation       atorvastatin 80 MG tablet  Commonly known as:  LIPITOR      Take 1 tablet by  mouth Every Night.       docusate sodium 100 MG capsule  Start taking on:  May 1, 2020      Take 100 mg by mouth Daily.       furosemide 20 MG tablet  Commonly known as:  LASIX  Start taking on:  May 1, 2020      Take 1 tablet by mouth Daily.       pantoprazole 40 MG EC tablet  Commonly known as:  Protonix      Take 1 tablet by mouth 2 (Two) Times a Day.       polyethylene glycol 17 g packet  Commonly known as:  MIRALAX      Take 17 g by mouth 2 (Two) Times a Day As Needed (constipation).       potassium chloride 20 MEQ packet  Commonly known as:  KLOR-CON  Start taking on:  May 1, 2020      Take 20 mEq by mouth Daily.          CONTINUE taking these medications      Instructions Last Dose Given Next Dose Due   clomiPRAMINE 50 MG capsule  Commonly known as:  ANAFRANIL      Take 150 mg by mouth Every Night.       Viagra 100 MG tablet  Generic drug:  sildenafil      Take 100 mg by mouth Daily As Needed.             Where to Get Your Medications      These medications were sent to [facility] [place] [address]    Hours:  7:00AM-6PM Mon-Fri Phone:  [phone]   · apixaban 5 MG tablet tablet  · atorvastatin 80 MG tablet  · furosemide 20 MG tablet  · pantoprazole 40 MG EC tablet  · potassium chloride 20 MEQ packet     Information about where to get these medications is not yet available    Ask your nurse or doctor about these medications  · acetaminophen 325 MG tablet  · docusate sodium 100 MG capsule  · polyethylene glycol 17 g packet          Future Appointments   Date Time Provider Department Center   7/10/2020 11:15 AM JOHN US NIVAS ARTERIAL CRT 1  [facility]   7/17/2020 10:50 AM LAB CHAIR 4 [facility]  LAB [facility]   7/17/2020 11:20 AM Cezar Streeter MD PhD [facility]   7/31/2020 11:00 AM Jade Cherry APRN [facility]     Additional Instructions for the Follow-ups that You Need to Schedule     Ambulatory Referral to Home Health   As directed      Face to  Face Visit Date:  4/30/2020    Follow-up provider for Plan of Care?:  I treated the patient in an acute care facility and will not continue treatment after discharge.    Follow-up provider:  YULIYA CONDON [[id number]]    Reason/Clinical Findings:  stroke, debility, dvt    Describe mobility limitations that make leaving home difficult:  stroke, debility, dvt    Nursing/Therapeutic Services Requested:  Skilled Nursing Physical Therapy Occupational Therapy    Skilled nursing orders:  Medication education    PT orders:  Therapeutic exercise Strengthening    Occupational orders:  Activities of daily living Home safety assessment Strengthening Fine motor         Discharge Follow-up with PCP   As directed       Currently Documented PCP:    Yuliya Condon APRN    PCP Phone Number:    [phone]     Follow Up Details:  1-2 weeks         Discharge Follow-up with Specialty: [facility] GI in 3-4 weeks   As directed      Specialty:  Denominational GI in 3-4 weeks         Discharge Follow-up with Specialty: CBC Oncology in 4 weeks   As directed      Specialty:  CBC Oncology in 4 weeks         Discharge Follow-up with Specialty: Cardiology in 6 weeks   As directed      Specialty:  Cardiology in 6 weeks         Discharge Follow-up with Specialty: Neurology in 3 months   As directed      Specialty:  Neurology in 3 months            Contact information for follow-up providers     Remi John MD Follow up.    Specialty:  Cardiology  Contact information:  [address]  [facility] [id number] [phone]             Yuliya Condon APRN .    Specialties:  Family Medicine, Urgent Care  Why:  1-2 weeks  Contact information:  [address]  [address]  [facility] [id number] [phone]                   Contact information for after-discharge care     Durable Medical Equipment     [facility] .    Service:  Durable Medical Equipment  Contact information:  [address]  [facility] [address]  [phone]                 Home Medical Care     [facility] .    Service:  Home Health Services  Contact information:  [address]  [facility] [address] [phone]                             TEST  RESULTS PENDING AT DISCHARGE   Order Current Status    Beta-2 Glycoprotein Antibodies In process    Lupus Anticoag / Cardiolipin Ab In process    Phosphatidylserine Antibodies In process    Protein C Activity In process    Protein S Functional In process             Lebron Addison MD  [facility]  04/30/20  13:06      Time: greater than 32 minutes on discharge  It was a pleasure taking care of this patient while in the hospital.       Electronically signed by Lebron Addison MD at 04/30/20 0906

## 2020-05-01 NOTE — OUTREACH NOTE
Call Center TCM Note      Responses   [facility]   COVID-19 Test Status  Negative   Does the patient have one of the following disease processes/diagnoses(primary or secondary)?  COPD/Pneumonia   TCM attempt successful?  Yes   Call start time  1400   Call end time  1410   Discharge diagnosis  Aspiration PNA   Meds reviewed with patient/caregiver?  Yes   Is the patient having any side effects they believe may be caused by any medication additions or changes?  No   Does the patient have all medications ordered at discharge?  Yes   Is the patient taking all medications as directed (includes completed medication regime)?  Yes   Does the patient have a primary care provider?   Yes   Comments regarding PCP  Please schedule office visit for patient.  Thank you.   Has the patient kept scheduled appointments due by today?  N/A   What is the Home health agency?   [facility]   Has home health visited the patient within 72 hours of discharge?  Yes   What DME was ordered?  Rolling walker through [facility]   Has all DME been delivered?  Yes   Psychosocial issues?  No   Did the patient receive a copy of their discharge instructions?  Yes   Nursing interventions  Reviewed instructions with patient   What is the patient's perception of their health status since discharge?  Improving   Nursing Interventions  Nurse provided patient education   Additional teach back comments  Patient reports that he is still weak but much improved.  He denies questions regarding meds and d/c instructions   Is the patient/caregiver able to teach back signs and symptoms of worsening condition:  Fever/chills, Shortness of breath, Chest pain   Is the patient/caregiver able to teach back importance of completing antibiotic course of treatment?  Yes   TCM call completed?  Yes        Patient requests to be seen in office.  Please schedule appointment with patient per PCP preference.  Thank you.  Dayan Guzman,  LPN    5/1/2020, 14:10

## 2020-05-02 ENCOUNTER — READMISSION MANAGEMENT (OUTPATIENT)
Dept: CALL CENTER | Facility: HOSPITAL | Age: 65
End: 2020-05-02

## 2020-05-02 NOTE — OUTREACH NOTE
Medical Week 1 Survey      Responses   [facility]   COVID-19 Test Status  Negative   Does the patient have one of the following disease processes/diagnoses(primary or secondary)?  COPD/Pneumonia   Is there a successful TCM telephone encounter documented?  No   Week 1 attempt successful?  Yes   Call start time  1102   Call end time  1110   Discharge diagnosis  Aspiration PNA   Meds reviewed with patient/caregiver?  Yes   Is the patient having any side effects they believe may be caused by any medication additions or changes?  No   Does the patient have all medications ordered at discharge?  Yes   Is the patient taking all medications as directed (includes completed medication regime)?  Yes   Does the patient have a primary care provider?   Yes   Does the patient have an appointment with their PCP within 7 days of discharge?  No   What is preventing the patient from scheduling follow up appointments within 7 days of discharge?  Haven't had time   Nursing Interventions  Educated patient on importance of making appointment, Advised patient to make appointment   Has the patient kept scheduled appointments due by today?  N/A   What is the Home health agency?   [facility]   Has home health visited the patient within 72 hours of discharge?  Yes   What DME was ordered?  Rolling walker through [facility]   Has all DME been delivered?  Yes   Pulse Ox monitoring  None   Psychosocial issues?  No   Comments  Pt had two episodes of diarrhea yesterday. Denies SOA or fevers. Advised to hold laxatives and contact PCP on Monday.   Did the patient receive a copy of their discharge instructions?  Yes   Nursing interventions  Reviewed instructions with patient   What is the patient's perception of their health status since discharge?  New symptoms unrelated to diagnosis [Diarrhea]   Is the patient/caregiver able to teach back signs and symptoms related to disease process for when to call PCP?  Yes   Is the  patient/caregiver able to teach back signs and symptoms related to disease process for when to call 911?  Yes   Is the patient/caregiver able to teach back the hierarchy of who to call/visit for symptoms/problems? PCP, Specialist, Home health nurse, Urgent Care, ED, 911  Yes   Additional teach back comments  Discussed signs and symptoms of dehydration and discussed hydration.    Week 1 call completed?  Yes          Jean Marie Crespo RN

## 2020-05-03 ENCOUNTER — READMISSION MANAGEMENT (OUTPATIENT)
Dept: CALL CENTER | Facility: HOSPITAL | Age: 65
End: 2020-05-03

## 2020-05-03 NOTE — OUTREACH NOTE
Medical Week 1 Survey      Responses   [facility] patient discharged from?  [facility]   COVID-19 Test Status  Negative   Does the patient have one of the following disease processes/diagnoses(primary or secondary)?  COPD/Pneumonia   Is there a successful TCM telephone encounter documented?  No   Week 1 attempt successful?  No          Jean Marie Crespo RN

## 2020-05-04 ENCOUNTER — READMISSION MANAGEMENT (OUTPATIENT)
Dept: CALL CENTER | Facility: HOSPITAL | Age: 65
End: 2020-05-04

## 2020-05-04 NOTE — PROGRESS NOTES
Case Management Discharge Note      Final Note: Home with [place]  and rolling walker from [facility]. Wife transported. SAUMYAMedhatyolanda rn/ccp    Provided Post Acute Provider List?: Yes  Post Acute Provider List: Home Health, DME Supplier, Nursing Home  Provided Post Acute Provider Quality & Resource List?: Yes  Post Acute Provider Quality and Resource List: Home Health, Nursing Home  Delivered To: Support Person  Support Person: Kelsey Youssef [phone]  Method of Delivery: Telephone    Destination      No service has been selected for the patient.      Durable Medical Equipment      Service Provider Request Status Selected Services Address Phone Number Fax Number    [facility] D[facility] Medical Equipment [address], [facility] [id number] [phone]      Dialysis/Infusion      No service has been selected for the patient.      Home Medical Care      Service Provider Request Status Selected Services Address Phone Number Fax Number    [facility] [place] Selected Home Health Services [address] [address] [phone]      Therapy      No service has been selected for the patient.      Community Resources      No service has been selected for the patient.        Transportation Services  Private: Car    Final Discharge Disposition Code: 06 - home with home health care

## 2020-05-04 NOTE — OUTREACH NOTE
Medical Week 1 Survey      Responses   [facility]   COVID-19 Test Status  Negative   Does the patient have one of the following disease processes/diagnoses(primary or secondary)?  COPD/Pneumonia   Is there a successful TCM telephone encounter documented?  No   Week 1 attempt successful?  Yes   Call start time  0850   Call end time  0853   Meds reviewed with patient/caregiver?  Yes   Is the patient having any side effects they believe may be caused by any medication additions or changes?  No   Does the patient have all medications ordered at discharge?  Yes   Is the patient taking all medications as directed (includes completed medication regime)?  Yes   Does the patient have a primary care provider?   Yes   Does the patient have an appointment with their PCP within 7 days of discharge?  Yes   Comments regarding PCP  PATIENT STATES HE HAS A PHONE APPOINTMENT WITH HIS PCP THIS WEDNESDAY 5/6   Has the patient kept scheduled appointments due by today?  N/A   What is the Home health agency?   [facility]   Has home health visited the patient within 72 hours of discharge?  Yes   What DME was ordered?  Rolling walker through [facility]   Has all DME been delivered?  Yes   Pulse Ox monitoring  None   Did the patient receive a copy of their discharge instructions?  Yes   Nursing interventions  Reviewed instructions with patient   What is the patient's perception of their health status since discharge?  Improving   Is the patient/caregiver able to teach back signs and symptoms related to disease process for when to call PCP?  Yes   Is the patient/caregiver able to teach back signs and symptoms related to disease process for when to call 911?  Yes   Is the patient/caregiver able to teach back the hierarchy of who to call/visit for symptoms/problems? PCP, Specialist, Home health nurse, Urgent Care, ED, 911  Yes   Week 1 call completed?  Yes          Norma Izaguirre LPN

## 2020-05-06 ENCOUNTER — TELEPHONE (OUTPATIENT)
Dept: FAMILY MEDICINE CLINIC | Facility: CLINIC | Age: 65
End: 2020-05-06

## 2020-05-06 ENCOUNTER — TELEMEDICINE (OUTPATIENT)
Dept: FAMILY MEDICINE CLINIC | Facility: CLINIC | Age: 65
End: 2020-05-06

## 2020-05-06 DIAGNOSIS — N18.9 ACUTE KIDNEY INJURY SUPERIMPOSED ON CHRONIC KIDNEY DISEASE (HCC): ICD-10-CM

## 2020-05-06 DIAGNOSIS — Z09 HOSPITAL DISCHARGE FOLLOW-UP: Primary | ICD-10-CM

## 2020-05-06 DIAGNOSIS — N17.9 ACUTE KIDNEY INJURY SUPERIMPOSED ON CHRONIC KIDNEY DISEASE (HCC): ICD-10-CM

## 2020-05-06 DIAGNOSIS — I63.9 CEREBELLAR INFARCT (HCC): ICD-10-CM

## 2020-05-06 DIAGNOSIS — S11.21XS TEAR OF ESOPHAGUS, SEQUELA: ICD-10-CM

## 2020-05-06 DIAGNOSIS — I82.463 ACUTE DEEP VEIN THROMBOSIS (DVT) OF CALF MUSCLE VEIN OF BOTH LOWER EXTREMITIES (HCC): ICD-10-CM

## 2020-05-06 LAB
APTT HEX PL PPP: 0 SEC
APTT IMM NP PPP: ABNORMAL S
APTT PPP 1:1 SALINE: ABNORMAL S
APTT PPP: 26.3 SEC
B2 GLYCOPROT1 IGA SER-ACNC: 21 SAU
B2 GLYCOPROT1 IGG SER-ACNC: 10 SGU
B2 GLYCOPROT1 IGM SER-ACNC: <10 SMU
CARDIOLIPIN IGG SER IA-ACNC: 13 GPL
CARDIOLIPIN IGM SER IA-ACNC: <10 MPL
CONFIRM DRVVT: ABNORMAL S
INR PPP: 1.1 RATIO
LAC INTERPRETATION: ABNORMAL
PROTHROMBIN TIME: 11.9 SEC
SCREEN DRVVT/NORMAL: ABNORMAL %
SCREEN DRVVT: 42.5 SEC
THROMBIN TIME: 16.3 SEC

## 2020-05-06 PROCEDURE — 99495 TRANSJ CARE MGMT MOD F2F 14D: CPT | Performed by: NURSE PRACTITIONER

## 2020-05-06 NOTE — PROGRESS NOTES
Transitional Care Follow Up Visit  Subjective     Leonardo Youssef is a 65 y.o. male who presents for a transitional care management visit.    Within 48 business hours after discharge our office contacted him via telephone to coordinate his care and needs.      I reviewed and discussed the details of that call along with the discharge summary, hospital problems, inpatient lab results, inpatient diagnostic studies, and consultation reports with Leonardo.     Current outpatient and discharge medications have been reconciled for the patient.  Reviewed by: RONNA Wilson      Date of TCM Phone Call 4/30/2020   [facility]   Date of Admission 4/16/2020   Date of Discharge 4/30/2020   Discharge Disposition Home or Self Care     Risk for Readmission (LACE) Score: 12 (4/30/2020  6:00 AM)      History of Present Illness   Course During Hospital Stay:    States had a few months of trouble swallowing, felt foods were stuck in throat, would take another swallow and it would go down. States a piece of peter was stuck in his throat that resulted in pain, leading him to ER. At that time, he was admitted. During his hospitalization, it was found he had an esophageal tear. He also was noted to have a stroke. His hospitalization was complicated, requiring intubation with a diagnosis of sepsis. He was discharged home on 04/30 with home health.     Using a walker in the home, has home health coming to home. States bp has been checked and only on one occasion was it elevated after an extensive exercise with PT.     Previous medication, clomipramine 3 tabs via psych.       Med reconciled below. Denied need for refills.      Current Outpatient Medications:   •  apixaban (ELIQUIS) 5 MG tablet tablet, Take 1 tablet by mouth Every 12 (Twelve) Hours. Indications: Other - full anticoagulation, Disp: 60 tablet, Rfl: 1  •  atorvastatin (LIPITOR) 80 MG tablet, Take 1 tablet by mouth Every Night., Disp: 30 tablet, Rfl: 1  •   clomiPRAMINE (ANAFRANIL) 50 MG capsule, Take 150 mg by mouth Every Night., Disp: , Rfl:   •  furosemide (LASIX) 20 MG tablet, Take 1 tablet by mouth Daily., Disp: 30 tablet, Rfl: 0  •  pantoprazole (Protonix) 40 MG EC tablet, Take 1 tablet by mouth 2 (Two) Times a Day., Disp: 60 tablet, Rfl: 0  •  potassium chloride (KLOR-CON) 20 MEQ packet, Take 20 mEq by mouth Daily., Disp: 30 packet, Rfl: 0     He is scheduled a follow up with neurology, cardiology and hematology. Does not recall a gastro follow up although he was seeing Dr. Oden inpatient. He is currently wearing a zio patch and further instructions will be given via cardiology upon completion.        The following portions of the patient's history were reviewed and updated as appropriate: allergies, current medications, past family history, past medical history, past social history, past surgical history and problem list.    Review of Systems   Constitutional: Positive for activity change (gradually improving ) and fatigue.   Neurological: Positive for weakness (improving with PT).   Psychiatric/Behavioral: The patient is nervous/anxious (manageable).        Objective   Physical Exam   Constitutional: He is oriented to person, place, and time. He appears well-developed and well-nourished. No distress.   Eyes: Conjunctivae are normal.   Neck: No JVD present.   Appears normal     Pulmonary/Chest: Effort normal. No respiratory distress.   Neurological: He is alert and oriented to person, place, and time.   Skin: Skin is warm and dry. He is not diaphoretic.   Psychiatric: He has a normal mood and affect.       Assessment/Plan   Diagnoses and all orders for this visit:    Hospital discharge follow-up    Acute deep vein thrombosis (DVT) of calf muscle vein of both lower extremities    Tear of esophagus, sequela    Cerebellar infarct (CMS/HCC)    Acute kidney injury superimposed on chronic kidney disease (CMS/HCC)      Follow up scheduled with neurology, subacute  infarct noted during hospitalization, on lipitor 80 mg    Recent DVT, on eliquis twice daily, awaiting results, follow up scheduled with Dr. Streeter with repeat study    Esophageal tear, per hospital notes, possible repeat EGD with biopsy, no follow up scheduled, will place referral to Dr. Oden for continuity of care, discussed with patient, advised it does not need to be urgent but would like follow up. Verbalized understanding.    Swelling during hospitalization, started on lasix, kcl, follow up scheduled with cardiology, awaiting zio patch   Discussed with patient need to follow up with labs in one month to monitor kidney function and potassium with new medication. Recommend [facility] at [facility], after June to monitor medication.     Reviewed health maintenance, recommend Pneumovax 23 at next visit  Next visit, will schedule medicare wellness, patient opted to wait to schedule due to pandemic.    You have chosen to receive care through a telehealth visit.  Do you consent to use a video/audio connection for your medical care today? Yes    Time spent 25 minutes

## 2020-05-06 NOTE — PATIENT INSTRUCTIONS
Schedule next visit as a Medicare Wellness Visit at your convenience.    Labs in one month, orders are available at [facility] on [address]    Referral placed to gastroenterology, Dr. Oden for possible follow up EGD (upper scope)    Recommend Pneumovax 23 at next visit (pneumonia vaccine)

## 2020-05-07 ENCOUNTER — READMISSION MANAGEMENT (OUTPATIENT)
Dept: CALL CENTER | Facility: HOSPITAL | Age: 65
End: 2020-05-07

## 2020-05-07 ENCOUNTER — TELEPHONE (OUTPATIENT)
Dept: NEUROLOGY | Facility: CLINIC | Age: 65
End: 2020-05-07

## 2020-05-07 NOTE — OUTREACH NOTE
Medical Week 1 Survey      Responses   [facility] patient discharged from?  [place]   COVID-19 Test Status  Negative   Does the patient have one of the following disease processes/diagnoses(primary or secondary)?  COPD/Pneumonia   Is there a successful TCM telephone encounter documented?  No   Week 1 attempt successful?  Yes   Call start time  0812   Call end time  0821   Is patient permission given to speak with other caregiver?  Yes   List who call center can speak with  Kelsey   Person spoke with today (if not patient) and relationship  Kelsey   Meds reviewed with patient/caregiver?  Yes   Is the patient having any side effects they believe may be caused by any medication additions or changes?  No   Does the patient have all medications ordered at discharge?  Yes   Is the patient taking all medications as directed (includes completed medication regime)?  Yes   Does the patient have a primary care provider?   Yes   Does the patient have an appointment with their PCP within 7 days of discharge?  Yes   Has the patient kept scheduled appointments due by today?  Yes   Comments  Has GI appt 05/12/20-may have to reschedule if not more stable by then.   Has home health visited the patient within 72 hours of discharge?  Yes   Home health comments  Pt Mon-Wed-Fri, and  nurse Tues-Thurs.   Has all DME been delivered?  Yes   Pulse Ox monitoring  None   Psychosocial issues?  No   Did the patient receive a copy of their discharge instructions?  Yes   What is the patient's perception of their health status since discharge?  Improving   Is the patient/caregiver able to teach back signs and symptoms related to disease process for when to call PCP?  Yes   Is the patient/caregiver able to teach back signs and symptoms related to disease process for when to call 911?  Yes   Is the patient/caregiver able to teach back the hierarchy of who to call/visit for symptoms/problems? PCP, Specialist, Home health nurse, Urgent Care,  ED, 911  Yes   Additional teach back comments  Encouraged to hydrate with water or caffeine free beverages.   Week 1 call completed?  Yes   Wrap up additional comments  Wife states is improving-states is lightheaded and BP drops when stands up-states PT/PCP aware-thought to be due to dehydration. Wife states is encouraging to increase fluids but will only drink Snapple tea-reviewed diuretic effect of tea. Wife states will only drink tea. No other problems stated today.          Renetta Meehan RN

## 2020-05-07 NOTE — TELEPHONE ENCOUNTER
Stroke Phone Call  Spoke with the patient's wife, Kelsey  · Admission Date: 4/16/2020   · Discharge Date:  4/30/2020   · Discharge Destination: Home  · Meds reviewed with patient/caregiver?    [x]Yes [] No     o Cholesterol Reducing:  Lipitor  - Understands purpose     [x]  Yes     []  No    - Understands how to take      [x]  Yes     []  No   - Anticoagulant: Eliquis  - Understands purpose     [x]  Yes     []  No    - Understands how to take      [x]  Yes     []  No     · Is the patient taking all medication as directed?   [x]  Yes  []  No  · Discussed personal risk factors   [x]  Yes []  No    o Physical Inactivity  - Engaged in physical activity [x]  Yes []  No   o High blood pressure   - Reviewed medications ordered for high blood pressure  - Has been monitoring BP [x]  Yes     []  No  - BP goal <130/80   - BP has been dropping when standing.  PCP has decreased lasix.  o High cholesterol   - Review desired LDL goal <70  o Atherosclerosis  - Plaque inside the arteries, “hardening of the arteries”  • Discussed signs and symptoms of stroke and when patient to call 911?   [x]  Yes []  No  o Sudden weakness or numbness of the face, arm, or leg especially on one side of the body  o Sudden confusion, trouble speaking or understanding  o Sudden trouble seeing in one or both eyes   o Sudden trouble walking, dizziness, loss of balance or coordination  o Sudden severe headaches with no known cause    Notified Patient that if any of these symptoms occur to call 911  · Does the patient have any new signs or symptoms of a stroke?   []  Yes     [x]  No  • Does the patient have increasing stiffness in arms, hands, or legs?    []  Yes     [x]  No   Is this interfering with activities of daily living?   []  Yes     [x]  No  · Does the patient have an appointment with PCP?  [x]  Yes     []  No  · Does the patient have 3 month Stroke Clinic appointment?  [x]  Yes     []  No  · Is the patient currently in therapy, outpatient, or  home health?  [x]  Yes     []  No     Needs a referral?       []  Yes     [x]  No

## 2020-05-08 ENCOUNTER — TELEPHONE (OUTPATIENT)
Dept: FAMILY MEDICINE CLINIC | Facility: CLINIC | Age: 65
End: 2020-05-08

## 2020-05-08 NOTE — TELEPHONE ENCOUNTER
TUAN WITH [facility] CALLED AND STATED WANTED TO LET HER KNOW WIFE PURCHASED A BLOOD PRESSURE CUFF AND HAS NOT BEEN TAKING HIS furosemide (LASIX) 20 MG tablet  110/80  AND HIS BLOOD PRESSURE GOOD AND NO EDEMA . AND WIFE HASEN'T BEEN GIVING HIM HIS MEDS . TUAN CALL BACK  [phone].

## 2020-05-10 ENCOUNTER — READMISSION MANAGEMENT (OUTPATIENT)
Dept: CALL CENTER | Facility: HOSPITAL | Age: 65
End: 2020-05-10

## 2020-05-10 NOTE — OUTREACH NOTE
"Medical Week 2 Survey      Responses   [facility]   COVID-19 Test Status  Negative   Does the patient have one of the following disease processes/diagnoses(primary or secondary)?  COPD/Pneumonia   Week 2 attempt successful?  Yes   Call start time  0953   Discharge diagnosis  Aspiration PNA   Call end time  0956   Meds reviewed with patient/caregiver?  Yes   Is the patient having any side effects they believe may be caused by any medication additions or changes?  No   Does the patient have all medications ordered at discharge?  Yes   Is the patient taking all medications as directed (includes completed medication regime)?  Yes   Does the patient have a primary care provider?   Yes   Has the patient kept scheduled appointments due by today?  Yes   What is the Home health agency?   [facility]   Has home health visited the patient within 72 hours of discharge?  Yes   Pulse Ox monitoring  None   Did the patient receive a copy of their discharge instructions?  Yes   Nursing interventions  Reviewed instructions with patient   What is the patient's perception of their health status since discharge?  Improving   Is the patient/caregiver able to teach back signs and symptoms related to disease process for when to call PCP?  Yes   Is the patient/caregiver able to teach back signs and symptoms related to disease process for when to call 911?  Yes   Is the patient/caregiver able to teach back the hierarchy of who to call/visit for symptoms/problems? PCP, Specialist, Home health nurse, Urgent Care, ED, 911  Yes   Additional teach back comments  Patient states he is doing much better.  States his bp has been \"good\".  No fever or sob.  He has followed up with his PCP and home health nursing and therapy are coming in.  States his appetite has been good and he is drinking plenty of fluids.   Week 2 Call Completed?  Yes   Wrap up additional comments  No questions or needs at this time          Aziza " Simran, CARMINAN

## 2020-05-12 ENCOUNTER — OFFICE VISIT (OUTPATIENT)
Dept: GASTROENTEROLOGY | Facility: CLINIC | Age: 65
End: 2020-05-12

## 2020-05-12 VITALS — HEIGHT: 72 IN | WEIGHT: 177 LBS | TEMPERATURE: 96 F | BODY MASS INDEX: 23.98 KG/M2

## 2020-05-12 DIAGNOSIS — I63.9 CEREBELLAR INFARCT (HCC): ICD-10-CM

## 2020-05-12 DIAGNOSIS — R13.19 ESOPHAGEAL DYSPHAGIA: ICD-10-CM

## 2020-05-12 DIAGNOSIS — I82.409 ACUTE DEEP VEIN THROMBOSIS (DVT) OF LOWER EXTREMITY, UNSPECIFIED LATERALITY, UNSPECIFIED VEIN (HCC): ICD-10-CM

## 2020-05-12 DIAGNOSIS — S11.21XD TEAR OF ESOPHAGUS, SUBSEQUENT ENCOUNTER: Primary | ICD-10-CM

## 2020-05-12 PROCEDURE — 99214 OFFICE O/P EST MOD 30 MIN: CPT | Performed by: INTERNAL MEDICINE

## 2020-05-12 RX ORDER — PANTOPRAZOLE SODIUM 40 MG/1
40 TABLET, DELAYED RELEASE ORAL 2 TIMES DAILY
Qty: 60 TABLET | Refills: 2 | Status: SHIPPED | OUTPATIENT
Start: 2020-05-12 | End: 2020-07-31

## 2020-05-12 NOTE — TELEPHONE ENCOUNTER
Spoke to Angelique, patient has continued lasix 10 mg, bp is running stable at 114/60, no further dizziness, no edema. Pushing fluids. Follow up with GI today.    We will continue lasix at 10 mg, consider d/c for hypotension.

## 2020-05-12 NOTE — PROGRESS NOTES
Chief Complaint   Patient presents with   • GI Problem     tear in esophagus     Subjective     HPI  Leonardo Youssef is a 65 y.o. male who presents for hospital follow-up.  He was admitted April 16 through the 30th.  He initially came in for epigastric pain.  CT imaging demonstrated suspicion for an esophageal mass and he had reported ongoing dysphagia.  He underwent urgent EGD that demonstrated a very significant esophageal tear and further questioning indicated that he had food stuck prior to hospitalization that led to him coming to the emergency room.  Unfortunately, his course was complicated by likely aspiration pneumonia requiring ICU level care and intubation.  He apparently was found to have a small stroke as well as lower extremity DVTs.  He is now on Eliquis.  He has been home for about 11 days now.  He is working with home PT.    He denies any further esophageal pain.  He does endorse that he is very careful with what he eats to avoid any food sticking.  He reports that he has had problems with esophageal dysphasia for many years prior to this recent hospitalization.    He is having a little bit of constipation and can go up to a week without having a bowel movement.  He was prescribed MiraLAX and docusate upon discharge from the hospital but has not taking these medications yet.    He is using a cane at home but was advised to bring a walker to the office visit today.    He says he has lost a little bit of weight but is otherwise eating 3 meals per day    He remains on pantoprazole twice daily    He says he has never had a colonoscopy done but does report that he had a negative Cologuard recently.    He denies any family history of GI malignancy though his mother had to have a colectomy for a history of polyps per his report.    Today's visit was in the office.  Both the patient and I were wearing face masks and proper hand hygiene was performed before and after the physical exam.        Past Medical  History:   Diagnosis Date   • Anxiety    • Depression    • Hx of radiation therapy    • Kidney stone    • OCD (obsessive compulsive disorder)    • Prostate cancer (CMS/HCC)     prostate       Social History     Socioeconomic History   • Marital status:      Spouse name: Not on file   • Number of children: 2   • Years of education: Not on file   • Highest education level: Not on file   Occupational History   • Occupation: retired teacher   Tobacco Use   • Smoking status: Never Smoker   • Smokeless tobacco: Never Used   Substance and Sexual Activity   • Alcohol use: Yes     Comment: 1 per week   • Drug use: No   • Sexual activity: Defer   Social History Narrative    , two kids, 38, 36    Four grandkids         Current Outpatient Medications:   •  apixaban (ELIQUIS) 5 MG tablet tablet, Take 1 tablet by mouth Every 12 (Twelve) Hours. Indications: Other - full anticoagulation, Disp: 60 tablet, Rfl: 1  •  atorvastatin (LIPITOR) 80 MG tablet, Take 1 tablet by mouth Every Night., Disp: 30 tablet, Rfl: 1  •  clomiPRAMINE (ANAFRANIL) 50 MG capsule, Take 150 mg by mouth Every Night., Disp: , Rfl:   •  furosemide (LASIX) 20 MG tablet, Take 1 tablet by mouth Daily., Disp: 30 tablet, Rfl: 0  •  pantoprazole (Protonix) 40 MG EC tablet, Take 1 tablet by mouth 2 (Two) Times a Day., Disp: 60 tablet, Rfl: 2  •  potassium chloride (KLOR-CON) 20 MEQ packet, Take 20 mEq by mouth Daily., Disp: 30 packet, Rfl: 0    Review of Systems   Constitutional: Negative for activity change, appetite change and fatigue.   HENT: Positive for trouble swallowing. Negative for sore throat.    Respiratory: Negative.    Cardiovascular: Negative.    Gastrointestinal: Positive for constipation. Negative for abdominal distention, abdominal pain and blood in stool.   Endocrine: Negative for cold intolerance and heat intolerance.   Genitourinary: Negative for difficulty urinating, dysuria and frequency.   Musculoskeletal: Negative for arthralgias,  "back pain and myalgias.   Skin: Negative.    Neurological: Positive for weakness.   Hematological: Negative for adenopathy. Does not bruise/bleed easily.   All other systems reviewed and are negative.      Objective   Vitals:    05/12/20 1407   Temp: 96 °F (35.6 °C)         05/12/20  1407   Weight: 80.3 kg (177 lb)     Body mass index is 24.01 kg/m².          Temp 96 °F (35.6 °C)   Ht 182.9 cm (72\")   Wt 80.3 kg (177 lb)   BMI 24.01 kg/m²     General Appearance:  Alert, cooperative, no distress, appears stated age   Head:  Normocephalic, without obvious abnormality, atraumatic   Eyes:  PERRL, conjunctiva/corneas clear, EOM's intact   Ears:  Normal external appearance of ear, hearing intact   Nose: Nares normal,mucosa normal, no drainage or sinus tenderness   Throat: Lips, mucosa, and tongue normal; teeth and gums normal   Neck: Supple, symmetrical, trachea midline, no adenopathy;  thyroid: not enlarged, symmetric, no tenderness/mass/nodule   Back:   Symmetric, no curvature, ROM normal   Lungs:   Clear to auscultation bilaterally, respirations unlabored, effort normal   Heart:  Regular rate and rhythm, S1 and S2 normal, no murmur, rub, or gallop, no lower extremity edema   Abdomen:   Soft, non-tender, bowel sounds active all four quadrants,  no masses, no organomegaly   Rectal: Deferred   Musculoskeletal: Normal gait, normal station, no atrophy of extremities, using walker   Skin: Normal color, texture, and turgor, no rashes or lesions   Lymph nodes: Cervical and supraclavicular nodes normal   Psychiatric: Alert and oriented x 3, normal mood and affect,  normal recent and remote memory, normal judgment and insight         WBC   Date Value Ref Range Status   04/29/2020 9.61 3.40 - 10.80 10*3/mm3 Final   04/16/2019 4.77 3.40 - 10.80 10*3/mm3 Final     RBC   Date Value Ref Range Status   04/29/2020 4.66 4.14 - 5.80 10*6/mm3 Final   04/16/2019 5.60 4.14 - 5.80 10*6/mm3 Final     Hemoglobin   Date Value Ref Range " Status   04/29/2020 12.7 (L) 13.0 - 17.7 g/dL Final     Hematocrit   Date Value Ref Range Status   04/29/2020 38.2 37.5 - 51.0 % Final     MCV   Date Value Ref Range Status   04/29/2020 82.0 79.0 - 97.0 fL Final     MCH   Date Value Ref Range Status   04/29/2020 27.3 26.6 - 33.0 pg Final     MCHC   Date Value Ref Range Status   04/29/2020 33.2 31.5 - 35.7 g/dL Final     RDW   Date Value Ref Range Status   04/29/2020 14.4 12.3 - 15.4 % Final     RDW-SD   Date Value Ref Range Status   04/29/2020 42.0 37.0 - 54.0 fl Final     MPV   Date Value Ref Range Status   04/29/2020 10.5 6.0 - 12.0 fL Final     Platelets   Date Value Ref Range Status   04/29/2020 474 (H) 140 - 450 10*3/mm3 Final     Neutrophil %   Date Value Ref Range Status   04/23/2020 68.1 42.7 - 76.0 % Final     Lymphocyte %   Date Value Ref Range Status   04/23/2020 15.2 (L) 19.6 - 45.3 % Final     Monocyte %   Date Value Ref Range Status   04/23/2020 7.8 5.0 - 12.0 % Final     Eosinophil %   Date Value Ref Range Status   04/23/2020 6.2 0.3 - 6.2 % Final     Basophil %   Date Value Ref Range Status   04/23/2020 0.7 0.0 - 1.5 % Final     Immature Grans %   Date Value Ref Range Status   04/23/2020 2.0 (H) 0.0 - 0.5 % Final     Neutrophils, Absolute   Date Value Ref Range Status   04/23/2020 4.81 1.70 - 7.00 10*3/mm3 Final     Lymphocytes, Absolute   Date Value Ref Range Status   04/23/2020 1.07 0.70 - 3.10 10*3/mm3 Final     Monocytes, Absolute   Date Value Ref Range Status   04/23/2020 0.55 0.10 - 0.90 10*3/mm3 Final     Eosinophils, Absolute   Date Value Ref Range Status   04/23/2020 0.44 (H) 0.00 - 0.40 10*3/mm3 Final     Basophils, Absolute   Date Value Ref Range Status   04/23/2020 0.05 0.00 - 0.20 10*3/mm3 Final     Immature Grans, Absolute   Date Value Ref Range Status   04/23/2020 0.14 (H) 0.00 - 0.05 10*3/mm3 Final     nRBC   Date Value Ref Range Status   04/23/2020 0.0 0.0 - 0.2 /100 WBC Final       Lab Results   Component Value Date    GLUCOSE 107  (H) 04/29/2020    BUN 26 (H) 04/29/2020    CREATININE 0.83 04/29/2020    EGFRIFNONA 92 04/29/2020    EGFRIFAFRI 82 04/16/2019    BCR 31.3 (H) 04/29/2020    CO2 24.0 04/29/2020    CALCIUM 8.8 04/29/2020    PROTENTOTREF 7.4 04/16/2019    ALBUMIN 2.40 (L) 04/23/2020    LABIL2 1.6 04/16/2019    AST 34 04/23/2020    ALT 33 04/23/2020         Imaging Results (Last 7 Days)     ** No results found for the last 168 hours. **            Assessment/Plan    Esophageal tear: Suspected secondary to episode of food sticking with associated retching.  Quite deep.  He has been on twice daily PPI    Esophageal dysphagia: Longstanding problem.  Suspected eosinophilic esophagitis versus reflux esophagitis    DVT on anticoagulation: He is on Eliquis which will impact our ability to perform future endoscopy    Cerebellar infarct: Hemorrhagic, thought to be secondary to cardiac origin but cardiac work-up negative so far    Plan  Will proceed with esophagram for further evaluation of his dysphagia, esophageal tear  Continue soft food diet  Continue twice daily PPI  I will see when Dr. Streeter is okay with him being able to hold the Eliquis and at that point we will proceed with EGD to assess healing of the tear, ulceration and dysphagia symptoms  Advised caution with eating, to ER for any food sticking for greater than 1 hour    Leonardo was seen today for gi problem.    Diagnoses and all orders for this visit:    Tear of esophagus, subsequent encounter  -     FL Esophagram Complete Double-Contrast; Future    Esophageal dysphagia  -     FL Esophagram Complete Double-Contrast; Future    Cerebellar infarct (CMS/HCC)    Acute deep vein thrombosis (DVT) of lower extremity, unspecified laterality, unspecified vein (CMS/HCC)    Other orders  -     pantoprazole (Protonix) 40 MG EC tablet; Take 1 tablet by mouth 2 (Two) Times a Day.        Dictated utilizing Dragon dictation

## 2020-05-13 ENCOUNTER — READMISSION MANAGEMENT (OUTPATIENT)
Dept: CALL CENTER | Facility: HOSPITAL | Age: 65
End: 2020-05-13

## 2020-05-13 NOTE — OUTREACH NOTE
Medical Week 2 Survey      Responses   [facility] [place] [facility]   COVID-19 Test Status  Negative   Does the patient have one of the following disease processes/diagnoses(primary or secondary)?  COPD/Pneumonia   Week 2 attempt successful?  Yes   Call start time  0924   Discharge diagnosis  [facility]   Call end time  0927   Person spoke with today (if not patient) and relationship  Pt wife, Kelsey Hennessy reviewed with patient/caregiver?  Yes   Is the patient having any side effects they believe may be caused by any medication additions or changes?  No   Does the patient have all medications ordered at discharge?  Yes   Is the patient taking all medications as directed (includes completed medication regime)?  Yes   Does the patient have a primary care provider?   Yes   Does the patient have an appointment with their PCP within 7 days of discharge?  Yes   Has the patient kept scheduled appointments due by today?  Yes   What is the Home health agency?   [facility]   Has home health visited the patient within 72 hours of discharge?  Yes   What DME was ordered?  Rolling walker through [employer]   Has all DME been delivered?  Yes   Pulse Ox monitoring  None   Psychosocial issues?  No   Did the patient receive a copy of their discharge instructions?  Yes   Nursing interventions  Reviewed instructions with patient   What is the patient's perception of their health status since discharge?  Improving   Is the patient/caregiver able to teach back signs and symptoms related to disease process for when to call PCP?  Yes   Is the patient/caregiver able to teach back signs and symptoms related to disease process for when to call 911?  Yes   Is the patient/caregiver able to teach back the hierarchy of who to call/visit for symptoms/problems? PCP, Specialist, Home health nurse, Urgent Care, ED, 911  Yes   Week 2 Call Completed?  Yes          Jean Marie Crespo RN

## 2020-05-15 ENCOUNTER — TELEPHONE (OUTPATIENT)
Dept: FAMILY MEDICINE CLINIC | Facility: CLINIC | Age: 65
End: 2020-05-15

## 2020-05-18 ENCOUNTER — TELEPHONE (OUTPATIENT)
Dept: FAMILY MEDICINE CLINIC | Facility: CLINIC | Age: 65
End: 2020-05-18

## 2020-05-20 ENCOUNTER — TELEPHONE (OUTPATIENT)
Dept: FAMILY MEDICINE CLINIC | Facility: CLINIC | Age: 65
End: 2020-05-20

## 2020-05-20 ENCOUNTER — READMISSION MANAGEMENT (OUTPATIENT)
Dept: CALL CENTER | Facility: HOSPITAL | Age: 65
End: 2020-05-20

## 2020-05-20 NOTE — OUTREACH NOTE
Medical Week 3 Survey      Responses   [facility] patient discharged from?  [place]   COVID-19 Test Status  Negative   Does the patient have one of the following disease processes/diagnoses(primary or secondary)?  COPD/Pneumonia   Week 3 attempt successful?  No   Unsuccessful attempts  Attempt 1          Clair Eli RN

## 2020-05-20 NOTE — TELEPHONE ENCOUNTER
PT CALLED IN ASKING IF HE COULD STOP TAKING HIS potassium chloride.  PT STATES THAT HE IS NO LONGER TAKING LASIX.        PT CALL BACK [phone]

## 2020-05-22 ENCOUNTER — READMISSION MANAGEMENT (OUTPATIENT)
Dept: CALL CENTER | Facility: HOSPITAL | Age: 65
End: 2020-05-22

## 2020-05-22 DIAGNOSIS — R13.19 ESOPHAGEAL DYSPHAGIA: Primary | ICD-10-CM

## 2020-05-22 DIAGNOSIS — S11.21XD TEAR OF ESOPHAGUS, SUBSEQUENT ENCOUNTER: ICD-10-CM

## 2020-05-22 PROBLEM — S11.21XA ESOPHAGUS TEAR: Status: ACTIVE | Noted: 2020-05-22

## 2020-05-22 RX ORDER — SODIUM CHLORIDE, SODIUM LACTATE, POTASSIUM CHLORIDE, CALCIUM CHLORIDE 600; 310; 30; 20 MG/100ML; MG/100ML; MG/100ML; MG/100ML
30 INJECTION, SOLUTION INTRAVENOUS CONTINUOUS
Status: CANCELLED | OUTPATIENT
Start: 2020-06-20

## 2020-05-22 NOTE — OUTREACH NOTE
Medical Week 4 Survey      Responses   [facility] patient discharged from?  [place]   COVID-19 Test Status  Negative   Does the patient have one of the following disease processes/diagnoses(primary or secondary)?  COPD/Pneumonia   Week 4 attempt successful?  No          Hiral Vo RN

## 2020-05-26 ENCOUNTER — TELEPHONE (OUTPATIENT)
Dept: NEUROLOGY | Facility: CLINIC | Age: 65
End: 2020-05-26

## 2020-05-26 PROCEDURE — 0298T HOLTER MONITOR - 72 HOUR UP TO 21 DAY: CPT | Performed by: INTERNAL MEDICINE

## 2020-05-26 NOTE — TELEPHONE ENCOUNTER
----- Message from RONNA Garcia sent at 5/26/2020  9:41 AM EDT -----  Normal monitor study, no evidence of afib.

## 2020-06-01 ENCOUNTER — RESULTS ENCOUNTER (OUTPATIENT)
Dept: FAMILY MEDICINE CLINIC | Facility: CLINIC | Age: 65
End: 2020-06-01

## 2020-06-01 DIAGNOSIS — N17.9 ACUTE KIDNEY INJURY SUPERIMPOSED ON CHRONIC KIDNEY DISEASE (HCC): ICD-10-CM

## 2020-06-01 DIAGNOSIS — N18.9 ACUTE KIDNEY INJURY SUPERIMPOSED ON CHRONIC KIDNEY DISEASE (HCC): ICD-10-CM

## 2020-06-16 ENCOUNTER — TRANSCRIBE ORDERS (OUTPATIENT)
Dept: SLEEP MEDICINE | Facility: HOSPITAL | Age: 65
End: 2020-06-16

## 2020-06-16 DIAGNOSIS — Z01.818 OTHER SPECIFIED PRE-OPERATIVE EXAMINATION: Primary | ICD-10-CM

## 2020-06-17 ENCOUNTER — LAB (OUTPATIENT)
Dept: LAB | Facility: HOSPITAL | Age: 65
End: 2020-06-17

## 2020-06-17 DIAGNOSIS — Z01.818 OTHER SPECIFIED PRE-OPERATIVE EXAMINATION: ICD-10-CM

## 2020-06-17 PROCEDURE — U0004 COV-19 TEST NON-CDC HGH THRU: HCPCS

## 2020-06-18 LAB
REF LAB TEST METHOD: NORMAL
SARS-COV-2 RNA RESP QL NAA+PROBE: NOT DETECTED

## 2020-06-20 ENCOUNTER — ANESTHESIA (OUTPATIENT)
Dept: GASTROENTEROLOGY | Facility: HOSPITAL | Age: 65
End: 2020-06-20

## 2020-06-20 ENCOUNTER — ANESTHESIA EVENT (OUTPATIENT)
Dept: GASTROENTEROLOGY | Facility: HOSPITAL | Age: 65
End: 2020-06-20

## 2020-06-20 ENCOUNTER — HOSPITAL ENCOUNTER (OUTPATIENT)
Facility: HOSPITAL | Age: 65
Setting detail: HOSPITAL OUTPATIENT SURGERY
Discharge: HOME HEALTH | End: 2020-06-20
Attending: INTERNAL MEDICINE | Admitting: INTERNAL MEDICINE

## 2020-06-20 VITALS
SYSTOLIC BLOOD PRESSURE: 122 MMHG | RESPIRATION RATE: 16 BRPM | BODY MASS INDEX: 24.52 KG/M2 | HEART RATE: 69 BPM | DIASTOLIC BLOOD PRESSURE: 86 MMHG | WEIGHT: 181 LBS | TEMPERATURE: 98.4 F | OXYGEN SATURATION: 97 % | HEIGHT: 72 IN

## 2020-06-20 DIAGNOSIS — R13.19 ESOPHAGEAL DYSPHAGIA: ICD-10-CM

## 2020-06-20 DIAGNOSIS — S11.21XD TEAR OF ESOPHAGUS, SUBSEQUENT ENCOUNTER: ICD-10-CM

## 2020-06-20 PROCEDURE — 43239 EGD BIOPSY SINGLE/MULTIPLE: CPT | Performed by: INTERNAL MEDICINE

## 2020-06-20 PROCEDURE — 88305 TISSUE EXAM BY PATHOLOGIST: CPT | Performed by: INTERNAL MEDICINE

## 2020-06-20 PROCEDURE — 25010000002 PROPOFOL 10 MG/ML EMULSION: Performed by: ANESTHESIOLOGY

## 2020-06-20 PROCEDURE — 88313 SPECIAL STAINS GROUP 2: CPT | Performed by: INTERNAL MEDICINE

## 2020-06-20 PROCEDURE — S0260 H&P FOR SURGERY: HCPCS | Performed by: INTERNAL MEDICINE

## 2020-06-20 RX ORDER — SODIUM CHLORIDE, SODIUM LACTATE, POTASSIUM CHLORIDE, CALCIUM CHLORIDE 600; 310; 30; 20 MG/100ML; MG/100ML; MG/100ML; MG/100ML
30 INJECTION, SOLUTION INTRAVENOUS CONTINUOUS
Status: DISCONTINUED | OUTPATIENT
Start: 2020-06-20 | End: 2020-06-20 | Stop reason: HOSPADM

## 2020-06-20 RX ORDER — LIDOCAINE HYDROCHLORIDE 20 MG/ML
INJECTION, SOLUTION INFILTRATION; PERINEURAL AS NEEDED
Status: DISCONTINUED | OUTPATIENT
Start: 2020-06-20 | End: 2020-06-20 | Stop reason: SURG

## 2020-06-20 RX ORDER — LIDOCAINE HYDROCHLORIDE 10 MG/ML
0.5 INJECTION, SOLUTION INFILTRATION; PERINEURAL ONCE AS NEEDED
Status: DISCONTINUED | OUTPATIENT
Start: 2020-06-20 | End: 2020-06-20 | Stop reason: HOSPADM

## 2020-06-20 RX ORDER — SODIUM CHLORIDE, SODIUM LACTATE, POTASSIUM CHLORIDE, CALCIUM CHLORIDE 600; 310; 30; 20 MG/100ML; MG/100ML; MG/100ML; MG/100ML
1000 INJECTION, SOLUTION INTRAVENOUS CONTINUOUS
Status: DISCONTINUED | OUTPATIENT
Start: 2020-06-20 | End: 2020-06-20 | Stop reason: HOSPADM

## 2020-06-20 RX ORDER — PROPOFOL 10 MG/ML
VIAL (ML) INTRAVENOUS CONTINUOUS PRN
Status: DISCONTINUED | OUTPATIENT
Start: 2020-06-20 | End: 2020-06-20 | Stop reason: SURG

## 2020-06-20 RX ORDER — SODIUM CHLORIDE 0.9 % (FLUSH) 0.9 %
10 SYRINGE (ML) INJECTION AS NEEDED
Status: DISCONTINUED | OUTPATIENT
Start: 2020-06-20 | End: 2020-06-20 | Stop reason: HOSPADM

## 2020-06-20 RX ORDER — PROPOFOL 10 MG/ML
VIAL (ML) INTRAVENOUS AS NEEDED
Status: DISCONTINUED | OUTPATIENT
Start: 2020-06-20 | End: 2020-06-20 | Stop reason: SURG

## 2020-06-20 RX ADMIN — SODIUM CHLORIDE, POTASSIUM CHLORIDE, SODIUM LACTATE AND CALCIUM CHLORIDE 1000 ML: 600; 310; 30; 20 INJECTION, SOLUTION INTRAVENOUS at 10:29

## 2020-06-20 RX ADMIN — METOPROLOL TARTRATE 1 MG: 5 INJECTION, SOLUTION INTRAVENOUS at 11:20

## 2020-06-20 RX ADMIN — PROPOFOL 120 MG: 10 INJECTION, EMULSION INTRAVENOUS at 11:10

## 2020-06-20 RX ADMIN — SODIUM CHLORIDE, POTASSIUM CHLORIDE, SODIUM LACTATE AND CALCIUM CHLORIDE: 600; 310; 30; 20 INJECTION, SOLUTION INTRAVENOUS at 11:03

## 2020-06-20 RX ADMIN — LIDOCAINE HYDROCHLORIDE 60 MG: 20 INJECTION, SOLUTION INFILTRATION; PERINEURAL at 11:10

## 2020-06-20 RX ADMIN — PROPOFOL 300 MCG/KG/MIN: 10 INJECTION, EMULSION INTRAVENOUS at 11:10

## 2020-06-20 NOTE — ANESTHESIA PREPROCEDURE EVALUATION
Anesthesia Evaluation     Patient summary reviewed and Nursing notes reviewed                Airway   Mallampati: II  TM distance: >3 FB  Neck ROM: full  No difficulty expected  Dental - normal exam     Pulmonary - normal exam   (+) pneumonia resolved ,     ROS comment: Hx aspiration/acute respiratory failure with hypoxia after recent esophageal tear  Cardiovascular - normal exam    (+) DVT resolved,       Neuro/Psych  (+) CVA, psychiatric history Depression and Anxiety,     GI/Hepatic/Renal/Endo    (+)   renal disease stones,     ROS Comment: Hx esophageal tear few months ago    Musculoskeletal     Abdominal  - normal exam   Substance History      OB/GYN          Other      history of cancer      Other Comment: Hx prostate CA                Anesthesia Plan    ASA 3     MAC     intravenous induction     Anesthetic plan, all risks, benefits, and alternatives have been provided, discussed and informed consent has been obtained with: patient.

## 2020-06-20 NOTE — DISCHARGE INSTRUCTIONS
Gastritis, Adult  Gastritis is inflammation of the stomach. There are two kinds of gastritis:  · Acute gastritis. This kind develops suddenly.  · Chronic gastritis. This kind is much more common and lasts for a long time.  Gastritis happens when the lining of the stomach becomes weak or gets damaged. Without treatment, gastritis can lead to stomach bleeding and ulcers.  What are the causes?  This condition may be caused by:  · An infection.  · Drinking too much alcohol.  · Certain medicines. These include steroids, antibiotics, and some over-the-counter medicines, such as aspirin or ibuprofen.  · Having too much acid in the stomach.  · A disease of the intestines or stomach.  · Stress.  · An allergic reaction.  · Crohn's disease.  · Some cancer treatments (radiation).  Sometimes the cause of this condition is not known.  What are the signs or symptoms?  Symptoms of this condition include:  · Pain or a burning sensation in the upper abdomen.  · Nausea.  · Vomiting.  · An uncomfortable feeling of fullness after eating.  · Weight loss.  · Bad breath.  · Blood in your vomit or stools.  In some cases, there are no symptoms.  How is this diagnosed?  This condition may be diagnosed with:  · Your medical history and a description of your symptoms.  · A physical exam.  · Tests. These can include:  ? Blood tests.  ? Stool tests.  ? A test in which a thin, flexible instrument with a light and a camera is passed down the esophagus and into the stomach (upper endoscopy).  ? A test in which a sample of tissue is taken for testing (biopsy).  How is this treated?  This condition may be treated with medicines. The medicines that are used vary depending on the cause of the gastritis:  · If the condition is caused by a bacterial infection, you may be given antibiotic medicines.  · If the condition is caused by too much acid in the stomach, you may be given medicines called H2 blockers, proton pump inhibitors, or antacids.  Treatment  may also involve stopping the use of certain medicines, such as aspirin, ibuprofen, or other NSAIDs.  Follow these instructions at home:  Medicines  · Take over-the-counter and prescription medicines only as told by your health care provider.  · If you were prescribed an antibiotic medicine, take it as told by your health care provider. Do not stop taking the antibiotic even if you start to feel better.  Eating and drinking    · Eat small, frequent meals instead of large meals.  · Avoid foods and drinks that make your symptoms worse.  · Drink enough fluid to keep your urine pale yellow.  Alcohol use  · Do not drink alcohol if:  ? Your health care provider tells you not to drink.  ? You are pregnant, may be pregnant, or are planning to become pregnant.  · If you drink alcohol:  ? Limit your use to:  § 0-1 drink a day for women.  § 0-2 drinks a day for men.  ? Be aware of how much alcohol is in your drink. In the [place] one drink equals one 12 oz bottle of beer (355 mL), one 5 oz glass of wine (148 mL), or one 1½ oz glass of hard liquor (44 mL).  General instructions  · Talk with your health care provider about ways to manage stress, such as getting regular exercise or practicing deep breathing, meditation, or yoga.  · Do not use any products that contain nicotine or tobacco, such as cigarettes and e-cigarettes. If you need help quitting, ask your health care provider.  · Keep all follow-up visits as told by your health care provider. This is important.  Contact a health care provider if:  · Your symptoms get worse.  · Your symptoms return after treatment.  Get help right away if:  · You vomit blood or material that looks like coffee grounds.  · You have black or dark red stools.  · You are unable to keep fluids down.  · Your abdominal pain gets worse.  · You have a fever.  · You do not feel better after one week.  Summary  · Gastritis is inflammation of the lining of the stomach that can occur suddenly (acute) or  develop slowly over time (chronic).  · This condition is diagnosed with a medical history, a physical exam, or tests.  · This condition may be treated with medicines to treat infection or medicines to reduce the amount of acid in your stomach.  · Follow your health care provider's instructions about taking medicines, making changes to your diet, and knowing when to call for help.  This information is not intended to replace advice given to you by your health care provider. Make sure you discuss any questions you have with your health care provider.  Document Released: 12/12/2002 Document Revised: 05/07/2019 Document Reviewed: 05/07/2019  [employer] Patient Education © 2020 [employer]  Hiatal Hernia    A hiatal hernia occurs when part of the stomach slides above the muscle that separates the abdomen from the chest (diaphragm). A person can be born with a hiatal hernia (congenital), or it may develop over time. In almost all cases of hiatal hernia, only the top part of the stomach pushes through the diaphragm.  Many people have a hiatal hernia with no symptoms. The larger the hernia, the more likely it is that you will have symptoms. In some cases, a hiatal hernia allows stomach acid to flow back into the tube that carries food from your mouth to your stomach (esophagus). This may cause heartburn symptoms. Severe heartburn symptoms may mean that you have developed a condition called gastroesophageal reflux disease (GERD).  What are the causes?  This condition is caused by a weakness in the opening (hiatus) where the esophagus passes through the diaphragm to attach to the upper part of the stomach. A person may be born with a weakness in the hiatus, or a weakness can develop over time.  What increases the risk?  This condition is more likely to develop in:  · Older people. Age is a major risk factor for a hiatal hernia, especially if you are over the age of 50.  · Pregnant women.  · People who are  overweight.  · People who have frequent constipation.  What are the signs or symptoms?  Symptoms of this condition usually develop in the form of GERD symptoms. Symptoms include:  · Heartburn.  · Belching.  · Indigestion.  · Trouble swallowing.  · Coughing or wheezing.  · Sore throat.  · Hoarseness.  · Chest pain.  · Nausea and vomiting.  How is this diagnosed?  This condition may be diagnosed during testing for GERD. Tests that may be done include:  · X-rays of your stomach or chest.  · An upper gastrointestinal (GI) series. This is an X-ray exam of your GI tract that is taken after you swallow a chalky liquid that shows up clearly on the X-ray.  · Endoscopy. This is a procedure to look into your stomach using a thin, flexible tube that has a tiny camera and light on the end of it.  How is this treated?  This condition may be treated by:  · Dietary and lifestyle changes to help reduce GERD symptoms.  · Medicines. These may include:  ? Over-the-counter antacids.  ? Medicines that make your stomach empty more quickly.  ? Medicines that block the production of stomach acid (H2 blockers).  ? Stronger medicines to reduce stomach acid (proton pump inhibitors).  · Surgery to repair the hernia, if other treatments are not helping.  If you have no symptoms, you may not need treatment.  Follow these instructions at home:  Lifestyle and activity  · Do not use any products that contain nicotine or tobacco, such as cigarettes and e-cigarettes. If you need help quitting, ask your health care provider.  · Try to achieve and maintain a healthy body weight.  · Avoid putting pressure on your abdomen. Anything that puts pressure on your abdomen increases the amount of acid that may be pushed up into your esophagus.  ? Avoid bending over, especially after eating.  ? Raise the head of your bed by putting blocks under the legs. This keeps your head and esophagus higher than your stomach.  ? Do not wear tight clothing around your chest  or stomach.  ? Try not to strain when having a bowel movement, when urinating, or when lifting heavy objects.  Eating and drinking  · Avoid foods that can worsen GERD symptoms. These may include:  ? Fatty foods, like fried foods.  ? Citrus fruits, like oranges or lemon.  ? Other foods and drinks that contain acid, like orange juice or tomatoes.  ? Spicy food.  ? Chocolate.  · Eat frequent small meals instead of three large meals a day. This helps prevent your stomach from getting too full.  ? Eat slowly.  ? Do not lie down right after eating.  ? Do not eat 1-2 hours before bed.  · Do not drink beverages with caffeine. These include cola, coffee, cocoa, and tea.  · Do not drink alcohol.  General instructions  · Take over-the-counter and prescription medicines only as told by your health care provider.  · Keep all follow-up visits as told by your health care provider. This is important.  Contact a health care provider if:  · Your symptoms are not controlled with medicines or lifestyle changes.  · You are having trouble swallowing.  · You have coughing or wheezing that will not go away.  Get help right away if:  · Your pain is getting worse.  · Your pain spreads to your arms, neck, jaw, teeth, or back.  · You have shortness of breath.  · You sweat for no reason.  · You feel sick to your stomach (nauseous) or you vomit.  · You vomit blood.  · You have bright red blood in your stools.  · You have black, tarry stools.  This information is not intended to replace advice given to you by your health care provider. Make sure you discuss any questions you have with your health care provider.  Document Released: 03/09/2005 Document Revised: 11/30/2018 Document Reviewed: 07/23/2018  [employer]vier Patient Education © 2020 [employer]  Esophageal Stricture    Esophageal stricture is a narrowing (stricture) of the esophagus. The esophagus is the part of the body that moves food and liquid from your mouth to your stomach. The esophagus  can become narrow because of disease or damage to the area. This condition can make swallowing difficult, painful, or even impossible. It also makes choking more likely.  What are the causes?  The most common cause of this condition is gastroesophageal reflux disease (GERD). Normally, food travels down the esophagus and stays in the stomach to be digested. In GERD, food and stomach acid move back up into the esophagus. Over time, this causes scar tissue and leads to narrowing.  Other causes of esophageal stricture include:  · Scarring from swallowing (ingesting) a harmful substance.  · Damage from medical instruments used in the esophagus.  · Radiation therapy.  · Cancer.  · Inflammation of the esophagus.  What increases the risk?  You are more likely to develop an esophageal stricture if you have GERD or esophageal cancer.  What are the signs or symptoms?  Symptoms of this condition include:  · Difficulty swallowing.  · Pain when swallowing.  · Burning pain or discomfort in the throat or chest (heartburn).  · Vomiting or spitting up (regurgitating) food or liquids.  · Unexplained weight loss.  How is this diagnosed?  This condition may be diagnosed based on:  · Your symptoms and a physical exam.  · Tests, such as:  ? Upper endoscopy. Your health care provider will insert a flexible tube with a tiny camera on it (endoscope) into your esophagus to check for a stricture. A tissue sample (biopsy) may also be taken to be examined under a microscope.  ? Esophageal pH monitoring. This test involves using a tube to collect acid in the esophagus to determine how much stomach acid is entering the esophagus.  ? Barium swallow test. For this test, you will drink a chalky liquid (barium solution) that coats the lining of the esophagus. Then you will have an X-ray taken. The barium solution helps to show if there is a stricture.  How is this treated?  Treatment for esophageal stricture depends on what is causing your condition  and how severe your condition is. Treatment options include:  · Esophageal dilation. In this procedure, a health care provider inserts an endoscope or a tool called a dilator into the esophagus to gently stretch it and make the opening wider.  · Stents. In some cases, a health care provider may place a small device (stent) in the esophagus to keep it open.  · Acid-blocking medicines. Taking these can help you manage GERD symptoms after an esophageal stricture. Controlling your GERD symptoms or being free of them can prevent the stricture from returning.  Follow these instructions at home:  Eating and drinking         · Follow instructions from your health care provider about any diet changes.  · Cut your food into small pieces, chew well, and eat slowly  · Try to eat soft food that is easier to swallow.  · Eat and drink only when you are sitting upright.  · Do not drink alcohol. If you need help quitting, ask your health care provider.  · Do not eat during the 3 hours before bedtime.  · Do not overeat at meals.  · Do not eat foods that can make reflux worse. These include:  ? Fatty foods, such as red meat and processed foods.  ? Spicy foods.  ? Soda.  ? Tomato products.  ? Chocolate.  General instructions  · Take over-the-counter and prescription medicines only as told by your health care provider.  · Do not use any products that contain nicotine or tobacco, such as cigarettes and e-cigarettes. If you need help quitting, ask your health care provider.  · Lose weight if you are overweight.  · Wear loose, comfortable clothing.  · When lying in bed, raise (elevate) your head with pillows. This will help to prevent your stomach contents from backing up into your esophagus while you sleep.  · Keep all follow-up visits as told by your health care provider. This is important.  Contact a health care provider if:  · You have problems eating or swallowing.  · You regurgitate food and liquid.  · Your symptoms do not improve  with treatment.  Get help right away if:  · You can no longer keep down any food, drinks, or your saliva.  Summary  · Esophageal stricture is a narrowing of the part of the body that moves food and liquid from your mouth to your stomach (esophagus).  · The esophagus can become narrow because of disease or damage to the area. This can make swallowing difficult, painful, or even impossible.  · Treatment for esophageal stricture depends on what is causing your condition and how severe your condition is. In some cases, procedures may be done to make the opening of the esophagus wider or to place a stent in the esophagus to keep it open.  · Do not drink alcohol, overeat at meals, or eat foods that can make reflux worse.  This information is not intended to replace advice given to you by your health care provider. Make sure you discuss any questions you have with your health care provider.  Document Released: 08/28/2007 Document Revised: 03/15/2019 Document Reviewed: 08/24/2018  [employer] Patient Education © 2020 [employer]  Upper Endoscopy, Adult, Care After  This sheet gives you information about how to care for yourself after your procedure. Your health care provider may also give you more specific instructions. If you have problems or questions, contact your health care provider.  What can I expect after the procedure?  After the procedure, it is common to have:  · A sore throat.  · Mild stomach pain or discomfort.  · Bloating.  · Nausea.  Follow these instructions at home:    · Follow instructions from your health care provider about what to eat or drink after your procedure.  · Return to your normal activities as told by your health care provider. Ask your health care provider what activities are safe for you.  · Take over-the-counter and prescription medicines only as told by your health care provider.  · Do not drive for 24 hours if you were given a sedative during your procedure.  · Keep all follow-up visits as  told by your health care provider. This is important.  Contact a health care provider if you have:  · A sore throat that lasts longer than one day.  · Trouble swallowing.  Get help right away if:  · You vomit blood or your vomit looks like coffee grounds.  · You have:  ? A fever.  ? Bloody, black, or tarry stools.  ? A severe sore throat or you cannot swallow.  ? Difficulty breathing.  ? Severe pain in your chest or abdomen.  Summary  · After the procedure, it is common to have a sore throat, mild stomach discomfort, bloating, and nausea.  · Do not drive for 24 hours if you were given a sedative during the procedure.  · Follow instructions from your health care provider about what to eat or drink after your procedure.  · Return to your normal activities as told by your health care provider.  This information is not intended to replace advice given to you by your health care provider. Make sure you discuss any questions you have with your health care provider.  Document Released: 06/18/2013 Document Revised: 06/11/2019 Document Reviewed: 05/20/2019  [employer] Patient Education © 2020 [employer]  For the next 24 hours patient needs to be with a responsible adult.    For 24 hours DO NOT drive, operate machinery, appliances, drink alcohol, make important decisions or sign legal documents.    Start with a light or bland diet if you are feeling sick to your stomach otherwise advance to regular diet as tolerated.    Follow recommendations on procedure report if provided by your doctor.    Problems may include but not limited to: large amounts of bleeding, trouble breathing, repeated vomiting, severe unrelieved pain, fever or chills.

## 2020-06-20 NOTE — H&P
[facility]  Pre Procedure History & Physical    Chief Complaint: Esophageal tear, dysphagia      HPI: 65 y.o. male who presents for hospital follow-up.  He was admitted April 16 through the 30th.  He initially came in for epigastric pain.  CT imaging demonstrated suspicion for an esophageal mass and he had reported ongoing dysphagia.  He underwent urgent EGD that demonstrated a very significant esophageal tear and further questioning indicated that he had food stuck prior to hospitalization that led to him coming to the emergency room.  Unfortunately, his course was complicated by likely aspiration pneumonia requiring ICU level care and intubation.  He apparently was found to have a small stroke as well as lower extremity DVTs.  He is now on Eliquis.  He has been home for about 11 days now.  He is working with home PT.     He denies any further esophageal pain.  He does endorse that he is very careful with what he eats to avoid any food sticking.  He reports that he has had problems with esophageal dysphasia for many years prior to this recent hospitalization.     He is having a little bit of constipation and can go up to a week without having a bowel movement.  He was prescribed MiraLAX and docusate upon discharge from the hospital but has not taking these medications yet.     He is using a cane at home but was advised to bring a walker to the office visit today.     He says he has lost a little bit of weight but is otherwise eating 3 meals per day     He remains on pantoprazole twice daily     He says he has never had a colonoscopy done but does report that he had a negative Cologuard recently.     He denies any family history of GI malignancy though his mother had to have a colectomy for a history of polyps per his report.    Past Medical History:   Past Medical History:   Diagnosis Date   • Anxiety    • Aspiration pneumonia (CMS/HCC)    • Depression    • DVT, bilateral lower limbs  "(CMS/Formerly [facility])    • Dysphagia    • Hx of radiation therapy    • Kidney stone    • Lightheadedness    • OCD (obsessive compulsive disorder)    • Prostate cancer (CMS/HCC)     prostate   • Stroke (cerebrum) (CMS/Formerly [facility]) 05/2020       Family History:  Family History   Problem Relation Age of Onset   • Glaucoma Mother    • Alzheimer's disease Mother    • Hypertension Father    • Diabetes Father    • Liver cancer Father    • Nephrolithiasis Father    • Multiple myeloma Sister    • Other Brother         liver transplant   • Multiple myeloma Brother    • Liver cancer Brother    • Prostate cancer Brother    • Malig Hyperthermia Neg Hx        Social History:   reports that he has never smoked. He has never used smokeless tobacco. He reports that he drinks alcohol. He reports that he does not use drugs.    Medications:   No medications prior to admission.       Allergies:  Patient has no known allergies.    ROS:    Pertinent items are noted in HPI     Objective     Height 182.8 cm (71.97\"), weight 80.3 kg (177 lb).    Physical Exam   Constitutional: Pt is oriented to person, place, and time and well-developed, well-nourished, and in no distress.   HENT:   Mouth/Throat: Oropharynx is clear and moist.   Neck: Normal range of motion. Neck supple.   Cardiovascular: Normal rate, regular rhythm and normal heart sounds.    Pulmonary/Chest: Effort normal and breath sounds normal. No respiratory distress. No  wheezes.   Abdominal: Soft. Bowel sounds are normal.   Skin: Skin is warm and dry.   Psychiatric: Mood, memory, affect and judgment normal.     Assessment/Plan     Diagnosis: Esophageal tear, dysphagia      Anticipated Surgical Procedure:  EGD      The risks, benefits, and alternatives of this procedure have been discussed with the patient or the responsible party- the patient understands and agrees to proceed.  "

## 2020-06-20 NOTE — ANESTHESIA POSTPROCEDURE EVALUATION
Patient: Leonardo Youssef    Procedure Summary     Date:  06/20/20 Room / Location:   [facility]    Anesthesia Start:  1103 Anesthesia Stop:  1137    Procedure:  ESOPHAGOGASTRODUODENOSCOPY with biopsies (N/A Esophagus) Diagnosis:       Esophageal dysphagia      Tear of esophagus, subsequent encounter      (Esophageal dysphagia [R13.10])      (Tear of esophagus, subsequent encounter [S11.21XD])    Surgeon:  Rosanna Ghosh MD Provider:  James Marks MD    Anesthesia Type:  MAC ASA Status:  3          Anesthesia Type: MAC    Vitals  No vitals data found for the desired time range.          Post Anesthesia Care and Evaluation    Patient location during evaluation: PHASE II  Patient participation: complete - patient participated  Level of consciousness: awake and alert  Pain management: adequate  Airway patency: patent  Anesthetic complications: No anesthetic complications  PONV Status: none  Cardiovascular status: acceptable  Respiratory status: acceptable  Hydration status: acceptable

## 2020-06-24 LAB
CYTO UR: NORMAL
LAB AP CASE REPORT: NORMAL
LAB AP DIAGNOSIS COMMENT: NORMAL
PATH REPORT.ADDENDUM SPEC: NORMAL
PATH REPORT.FINAL DX SPEC: NORMAL
PATH REPORT.GROSS SPEC: NORMAL

## 2020-06-25 ENCOUNTER — OFFICE VISIT (OUTPATIENT)
Dept: CARDIOLOGY | Facility: CLINIC | Age: 65
End: 2020-06-25

## 2020-06-25 VITALS
BODY MASS INDEX: 25 KG/M2 | OXYGEN SATURATION: 94 % | SYSTOLIC BLOOD PRESSURE: 126 MMHG | HEART RATE: 102 BPM | HEIGHT: 72 IN | DIASTOLIC BLOOD PRESSURE: 80 MMHG | WEIGHT: 184.6 LBS

## 2020-06-25 DIAGNOSIS — I71.21 ASCENDING AORTIC ANEURYSM (HCC): ICD-10-CM

## 2020-06-25 DIAGNOSIS — I63.9 CEREBELLAR INFARCT (HCC): Primary | ICD-10-CM

## 2020-06-25 PROCEDURE — 93000 ELECTROCARDIOGRAM COMPLETE: CPT | Performed by: INTERNAL MEDICINE

## 2020-06-25 PROCEDURE — 99213 OFFICE O/P EST LOW 20 MIN: CPT | Performed by: INTERNAL MEDICINE

## 2020-06-25 NOTE — PROGRESS NOTES
Date of Office Visit: 2020    Patient Name: Leonardo Youssef  : 1955    Encounter Provider: Remi [facility] MD  Referring Provider: No ref. provider found  Place of Service: [facility] C[facility]  Patient Care Team:  Yuliya Condon APRN as PCP - General (Family Medicine)      Chief Complaint   Patient presents with   • Follow-up     History of Present Illness    The patient is a 65-year-old white male that I saw when he was hospitalized back in April with a cerebellar infarct.  At the time his electrocardiogram showed ST-T wave changes.  A catheterization indicated noncritical coronary atherosclerosis.  He has been placed on Eliquis as well as atorvastatin.  He is symptoms of his cerebellar infarct have improved greatly but he still has some dizziness with head movement.  During the course of his evaluation an echocardiogram was performed which showed that his ascending aorta was 4.6 cm.  He has no known history of hypertension.    Past Medical History:   Diagnosis Date   • Anxiety    • Aspiration pneumonia (CMS/HCC)    • Depression    • DVT, bilateral lower limbs (CMS/HCC)    • Dysphagia    • Hx of radiation therapy    • Kidney stone    • Lightheadedness    • OCD (obsessive compulsive disorder)    • Prostate cancer (CMS/HCC)     prostate   • Stroke (cerebrum) (CMS/HCC) 2020         Past Surgical History:   Procedure Laterality Date   • CARDIAC CATHETERIZATION N/A 2020    Procedure: Left Heart Cath;  Surgeon: Remi [facility] MD;  Location: [facility] INVASIVE LOCATION;  Service: Cardiovascular;  Laterality: N/A;   • CARDIAC CATHETERIZATION N/A 2020    Procedure: Coronary angiography;  Surgeon: Remi John MD;  Location: [facility] INVASIVE LOCATION;  Service: Cardiovascular;  Laterality: N/A;   • CARDIAC CATHETERIZATION N/A 2020    Procedure: Left ventriculography;  Surgeon: Remi John MD;  Location: [facility]  CATH INVASIVE LOCATION;  Service: Cardiovascular;  Laterality: N/A;   • ENDOSCOPY N/A 4/16/2020    Procedure: ESOPHAGOGASTRODUODENOSCOPY WITH COLD BIOPSIES;  Surgeon: Renetta Oden MD;  Location: [facility] ENDOSCOPY;  Service: Gastroenterology;  Laterality: N/A;  PRE- DYSPHAGIA, ABNORMAL CT  POST- ESOPHAGITIS WITH LARGE ESOPHAGEAL ULCER, HIATAL HERNIA, RETAINED FOOD   • ENDOSCOPY N/A 6/20/2020    Procedure: ESOPHAGOGASTRODUODENOSCOPY with biopsies;  Surgeon: Rosanna Ghosh MD;  Location: [facility] ENDOSCOPY;  Service: Gastroenterology;  Laterality: N/A;  pre- hx esophageal tear  post- esophadeal ring, hiatal hernia, gastritis, irregular z line, esophageal diverticumum           Current Outpatient Medications:   •  apixaban (ELIQUIS) 5 MG tablet tablet, Take 1 tablet by mouth Every 12 (Twelve) Hours. Indications: Other - full anticoagulation (Patient taking differently: Take 5 mg by mouth Every 12 (Twelve) Hours. HOLDING FOR PROCEDURE  Indications: Other - full anticoagulation), Disp: 60 tablet, Rfl: 1  •  atorvastatin (LIPITOR) 80 MG tablet, Take 1 tablet by mouth Every Night., Disp: 30 tablet, Rfl: 1  •  clomiPRAMINE (ANAFRANIL) 50 MG capsule, Take 150 mg by mouth Every Night., Disp: , Rfl:   •  pantoprazole (Protonix) 40 MG EC tablet, Take 1 tablet by mouth 2 (Two) Times a Day., Disp: 60 tablet, Rfl: 2      Social History     Socioeconomic History   • Marital status:      Spouse name: Not on file   • Number of children: 2   • Years of education: Not on file   • Highest education level: Not on file   Occupational History   • Occupation: retired teacher   Tobacco Use   • Smoking status: Never Smoker   • Smokeless tobacco: Never Used   Substance and Sexual Activity   • Alcohol use: Yes     Comment: 1 per week   • Drug use: No   • Sexual activity: Defer   Social History Narrative    , two kids, 38, 36    Four grandkids         Review of Systems   Constitution: Negative.   HENT: Negative.    Eyes:  "Negative.    Cardiovascular: Negative.    Respiratory: Negative.    Endocrine: Negative.    Skin: Negative.    Musculoskeletal: Negative.    Gastrointestinal: Negative.    Neurological: Positive for light-headedness.   Psychiatric/Behavioral: Negative.        Procedures      ECG 12 Lead  Date/Time: 6/25/2020 1:31 PM  Performed by: Remi John MD  Authorized by: Remi John MD   Comparison: compared with previous ECG from 4/28/2020  Rhythm: sinus rhythm  Rate: normal  Conduction: conduction normal  QRS axis: normal  Comments: Nonspecific ST-T wave changes have improved since his previous ECG                Objective:    /80 (BP Location: Left arm, Patient Position: Sitting)   Pulse 102   Ht 182.9 cm (72\")   Wt 83.7 kg (184 lb 9.6 oz)   SpO2 94%   BMI 25.04 kg/m²         Physical Exam   Constitutional: He is oriented to person, place, and time. He appears well-developed and well-nourished.   HENT:   Head: Normocephalic.   Eyes: Pupils are equal, round, and reactive to light.   Neck: Normal range of motion. No JVD present. Carotid bruit is not present. No thyromegaly present.   Cardiovascular: Normal rate, regular rhythm, S1 normal, S2 normal, normal heart sounds and intact distal pulses. Exam reveals no gallop and no friction rub.   No murmur heard.  Pulmonary/Chest: Effort normal and breath sounds normal.   Abdominal: Soft. Bowel sounds are normal.   Musculoskeletal: He exhibits no edema.   Neurological: He is alert and oriented to person, place, and time.   Skin: Skin is warm, dry and intact. No erythema.   Psychiatric: He has a normal mood and affect.   Vitals reviewed.          Assessment:       Diagnosis Plan   1. Cerebellar infarct (CMS/HCC)     2. Ascending aortic aneurysm (CMS/HCC)         1.  Cerebellar infarct: Remains on anticoagulation  2.  Thoracic ascending aortic aneurysm: At this point there is no need to intervene.  We will see him back next year.  I will perform a CT scan " on him for reevaluation.     Plan:

## 2020-07-01 ENCOUNTER — TELEMEDICINE (OUTPATIENT)
Dept: FAMILY MEDICINE CLINIC | Facility: CLINIC | Age: 65
End: 2020-07-01

## 2020-07-01 ENCOUNTER — TELEPHONE (OUTPATIENT)
Dept: GASTROENTEROLOGY | Facility: CLINIC | Age: 65
End: 2020-07-01

## 2020-07-01 DIAGNOSIS — R42 VERTIGO: Primary | ICD-10-CM

## 2020-07-01 PROCEDURE — 99213 OFFICE O/P EST LOW 20 MIN: CPT | Performed by: NURSE PRACTITIONER

## 2020-07-01 RX ORDER — ATORVASTATIN CALCIUM 40 MG/1
40 TABLET, FILM COATED ORAL DAILY
Qty: 30 TABLET | Refills: 5 | Status: SHIPPED | OUTPATIENT
Start: 2020-07-01 | End: 2020-12-17

## 2020-07-01 NOTE — TELEPHONE ENCOUNTER
----- Message from Rosanna Ghosh MD sent at 7/1/2020  1:30 PM EDT -----  Biopsies from his EGD show mild inflammation of the stomach body.  He does have evidence of reflux on biopsies of his EGD as well as eosinophilic esophagitis which can make swallowing difficult.  There is no evidence of Whitney's esophagus    He needs to continue the pantoprazole but can decrease it to once daily dosing––he should take it every morning before breakfast    Please place in 1 year EGD recall, thanks

## 2020-07-01 NOTE — TELEPHONE ENCOUNTER
----- Message from John Hyde sent at 7/1/2020  2:11 PM EDT -----  Regarding: Results   Contact: [phone]  Patient is calling for results from procedure done on 6/20 with Dr. Ghosh.

## 2020-07-01 NOTE — PROGRESS NOTES
Subjective   Leonardo Yousesf is a 65 y.o. male   who presents for   Chief Complaint   Patient presents with   • Dizziness     Choking episode called 911, went to hospital and admitted for  days  lipitor, protonix, eliquis, taking as scheduled, concerned with new onset dizziness, since hospitalization, occasional confusion, unrelated to time  Dizziness is related to walking and making a sudden turn, or looking up.  PT completed, worse with movement, denies sinus pain or pressure, no fluid in ears  bp recently in /80, 122/86  Improved day to day, but ok to walk and drive, only with sudden movement of head    There were no vitals taken for this visit.      History of Present Illness   Dizziness   This is a new problem. The current episode started more than 1 month ago. The problem occurs intermittently. The problem has been waxing and waning. Associated symptoms include vertigo. Pertinent negatives include no abdominal pain, anorexia, arthralgias, change in bowel habit, chest pain, chills, congestion, coughing, diaphoresis, fatigue, fever, headaches, joint swelling, myalgias, nausea, neck pain, numbness, rash, sore throat, swollen glands, urinary symptoms, visual change, vomiting or weakness. The symptoms are aggravated by standing (looking upwards, turning head). He has tried nothing for the symptoms. The treatment provided no relief.       The following portions of the patient's history were reviewed and updated as appropriate: allergies, current medications, past family history, past medical history, past social history, past surgical history and problem list.    Review of Systems  Review of Systems   Constitutional: Negative for chills, diaphoresis, fatigue and fever.   HENT: Negative for congestion and sore throat.    Respiratory: Negative for cough.    Cardiovascular: Negative for chest pain.   Gastrointestinal: Negative for abdominal pain, anorexia, change in bowel habit, nausea and vomiting.    Musculoskeletal: Negative for arthralgias, joint swelling, myalgias and neck pain.   Skin: Negative for rash.   Neurological: Positive for dizziness and vertigo. Negative for weakness, numbness and headaches.       Objective   Physical Exam  Physical Exam   Constitutional: He is oriented to person, place, and time. He appears well-developed and well-nourished. No distress.   Pulmonary/Chest: Effort normal.   Neurological: He is alert and oriented to person, place, and time.   Skin: Skin is warm and dry. He is not diaphoretic.   Psychiatric: He has a normal mood and affect.         Assessment/Plan   Leonardo was seen today for dizziness.    Diagnoses and all orders for this visit:    Vertigo  -     Ambulatory Referral to ENT (Otolaryngology)    Other orders  -     atorvastatin (Lipitor) 40 MG tablet; Take 1 tablet by mouth Daily.  -     apixaban (ELIQUIS) 5 MG tablet tablet; Take 1 tablet by mouth Every 12 (Twelve) Hours. Indications: Other - full anticoagulation    continue current medications, follow up with ENT for what sounds like positional vertigo  Post discharge, he was having hypotensive episodes which has resolved  Will try lower dose lipitor, but do recommend continuation of lipitor, to reduce ?side effects reported by patient  You have chosen to receive care through a telehealth visit.  Do you consent to use a video/audio connection for your medical care today? Yes time spent 15 minutes

## 2020-07-01 NOTE — TELEPHONE ENCOUNTER
Called pt and advised of Dr Ghosh note. Pt verb understanding.     Egd placed in recall for 06/20/2021.

## 2020-07-10 ENCOUNTER — HOSPITAL ENCOUNTER (OUTPATIENT)
Dept: CARDIOLOGY | Facility: HOSPITAL | Age: 65
Discharge: HOME | End: 2020-07-10
Admitting: INTERNAL MEDICINE

## 2020-07-10 DIAGNOSIS — I82.453 ACUTE DEEP VEIN THROMBOSIS (DVT) OF BOTH PERONEAL VEINS (HCC): ICD-10-CM

## 2020-07-10 LAB

## 2020-07-10 PROCEDURE — 93970 EXTREMITY STUDY: CPT

## 2020-07-17 ENCOUNTER — OFFICE VISIT (OUTPATIENT)
Dept: ONCOLOGY | Facility: CLINIC | Age: 65
End: 2020-07-17

## 2020-07-17 ENCOUNTER — LAB (OUTPATIENT)
Dept: LAB | Facility: HOSPITAL | Age: 65
End: 2020-07-17

## 2020-07-17 VITALS
DIASTOLIC BLOOD PRESSURE: 99 MMHG | WEIGHT: 182.1 LBS | RESPIRATION RATE: 16 BRPM | SYSTOLIC BLOOD PRESSURE: 156 MMHG | HEIGHT: 72 IN | TEMPERATURE: 97.3 F | HEART RATE: 83 BPM | BODY MASS INDEX: 24.66 KG/M2 | OXYGEN SATURATION: 100 %

## 2020-07-17 DIAGNOSIS — I63.9 CEREBELLAR INFARCT (HCC): ICD-10-CM

## 2020-07-17 DIAGNOSIS — D64.9 ANEMIA, UNSPECIFIED TYPE: ICD-10-CM

## 2020-07-17 DIAGNOSIS — I82.409 ACUTE DEEP VEIN THROMBOSIS (DVT) OF LOWER EXTREMITY, UNSPECIFIED LATERALITY, UNSPECIFIED VEIN (HCC): Primary | ICD-10-CM

## 2020-07-17 LAB
BASOPHILS # BLD AUTO: 0.06 10*3/MM3 (ref 0–0.2)
BASOPHILS NFR BLD AUTO: 0.9 % (ref 0–1.5)
DEPRECATED RDW RBC AUTO: 45 FL (ref 37–54)
EOSINOPHIL # BLD AUTO: 0.46 10*3/MM3 (ref 0–0.4)
EOSINOPHIL NFR BLD AUTO: 6.6 % (ref 0.3–6.2)
ERYTHROCYTE [DISTWIDTH] IN BLOOD BY AUTOMATED COUNT: 15.8 % (ref 12.3–15.4)
HCT VFR BLD AUTO: 44.5 % (ref 37.5–51)
HGB BLD-MCNC: 14.7 G/DL (ref 13–17.7)
IMM GRANULOCYTES # BLD AUTO: 0.05 10*3/MM3 (ref 0–0.05)
IMM GRANULOCYTES NFR BLD AUTO: 0.7 % (ref 0–0.5)
LYMPHOCYTES # BLD AUTO: 1.6 10*3/MM3 (ref 0.7–3.1)
LYMPHOCYTES NFR BLD AUTO: 23.1 % (ref 19.6–45.3)
MCH RBC QN AUTO: 26.2 PG (ref 26.6–33)
MCHC RBC AUTO-ENTMCNC: 33 G/DL (ref 31.5–35.7)
MCV RBC AUTO: 79.3 FL (ref 79–97)
MONOCYTES # BLD AUTO: 0.69 10*3/MM3 (ref 0.1–0.9)
MONOCYTES NFR BLD AUTO: 10 % (ref 5–12)
NEUTROPHILS NFR BLD AUTO: 4.07 10*3/MM3 (ref 1.7–7)
NEUTROPHILS NFR BLD AUTO: 58.7 % (ref 42.7–76)
NRBC BLD AUTO-RTO: 0 /100 WBC (ref 0–0.2)
PLATELET # BLD AUTO: 219 10*3/MM3 (ref 140–450)
PMV BLD AUTO: 11 FL (ref 6–12)
RBC # BLD AUTO: 5.61 10*6/MM3 (ref 4.14–5.8)
WBC # BLD AUTO: 6.93 10*3/MM3 (ref 3.4–10.8)

## 2020-07-17 PROCEDURE — 36415 COLL VENOUS BLD VENIPUNCTURE: CPT

## 2020-07-17 PROCEDURE — 99215 OFFICE O/P EST HI 40 MIN: CPT | Performed by: INTERNAL MEDICINE

## 2020-07-17 PROCEDURE — 85025 COMPLETE CBC W/AUTO DIFF WBC: CPT

## 2020-07-17 NOTE — PROGRESS NOTES
.     REASON FOR FOLLOW-UP:     1. Subacute stroke involving the right cerebellum, and also bilateral lower extremity calf vein thrombosis in April 2020.    · Hypercoagulable work-up was completely negative.    · Patient currently is on Eliquis anticoagulation since the evening of 04/27/2020.   · Venous Doppler study on 7/10/2020 reported resolution of bilateral lower extremity calf vein thrombosis.    2.  Anemia in April 2020 during his hospitalization.   · Laboratory studies showed appropriate ferritin level 189 and slightly low iron saturation 12%.  Normal vitamin B12 level and folate level.   3. Esophageal ulcer status post EGD on 04/16/2020, biopsy sample showed no evidence of malignancy.   · The patient was followed by GI service, started on Protonix.  · Repeated EGD examination 6/20/2020 which reported esophagitis and gastritis.  No esophageal ulcer.                              HISTORY OF PRESENT ILLNESS: Patient is a 65 y.o. male who presents today for 3-month follow-up.  I saw him as a consult during his hospitalization in April 2020 after a  stroke that required ICU admission from 4/16/2020 to 4/30/2020.    Patient reports he is on Eliquis anticoagulation 5 mg twice a day, denies bleeding or bruising.  He is recovering from his stroke, he still has some imbalance month but improved.  He denies headaches vision changes.  His performance status is ECOG 1-0.    Patient reports he continues taking Protonix.  He denies melena hematochezia.    He has no complaints at this time.     I reviewed the medical records, and found out that he had repeated EGD examination 6/20/2020 which reported evidence of esophagitis and gastritis.  This is confirmed by by pathology evaluation.      Laboratory studies today, on 7/17/2020 reported normalized hemoglobin 14.7.  He has normal platelets 219,000 and WBC 6930 including ANC 4070 lymphocytes 1600.     Today I summarized the results of hypercoagulable work-up for him.  In  April 2020 we obtain comprehensive laboratory work-up during his hospitalization.  He had a negative work-up.  He was tested negative for factor II mutation, factor V Leyden mutation, and a normal Antithrombin activity 103%, protein C activity 109% and protein S activity 95%.  He was tested negative for anticardiolipin antibodies, anti-beta-2 5.2  Antibodies and anti-phosphatidylserine antibodies in the negative for lupus anticoagulant.    No results in April 2020 reported ferritin 189, iron saturation 12%, free iron 28 TIBC 228 and vitamin B12 at 541 and RBC folate 1025 ng/mL on 4/22/2020.  Had elevated C-reactive protein 4.27 mg/dL.  His hemoglobin was 10.1, platelets 199,000 and a WBC 7180 on the same day.  Serum folate was 8.4 ng/mL on 4/29/2020.      Past Medical History:   Diagnosis Date   • Anxiety    • Aspiration pneumonia (CMS/HCC)    • Depression    • DVT, bilateral lower limbs (CMS/HCC)    • Dysphagia    • Hx of radiation therapy    • Kidney stone    • Lightheadedness    • OCD (obsessive compulsive disorder)    • Prostate cancer (CMS/HCC)     prostate   • Stroke (cerebrum) (CMS/HCC) 05/2020   · Respiratory failure, required ventilation support in April 2020.  · Coronary artery disease/ischemic disease with cardiac catheterization on 4/27/2020.   ·   Past Surgical History:   Procedure Laterality Date   • CARDIAC CATHETERIZATION N/A 4/27/2020    Procedure: Left Heart Cath;  Surgeon: Remi John MD;  Location: [facility] INVASIVE LOCATION;  Service: Cardiovascular;  Laterality: N/A;   • CARDIAC CATHETERIZATION N/A 4/27/2020    Procedure: Coronary angiography;  Surgeon: Remi John MD;  Location: [facility] CATH INVASIVE LOCATION;  Service: Cardiovascular;  Laterality: N/A;   • CARDIAC CATHETERIZATION N/A 4/27/2020    Procedure: Left ventriculography;  Surgeon: Remi John MD;  Location: [facility] CATH INVASIVE LOCATION;  Service: Cardiovascular;  Laterality: N/A;   • ENDOSCOPY N/A  4/16/2020    Procedure: ESOPHAGOGASTRODUODENOSCOPY WITH COLD BIOPSIES;  Surgeon: Renetta Oden MD;  Location:  [facility];  Service: Gastroenterology;  Laterality: N/A;  PRE- DYSPHAGIA, ABNORMAL CT  POST- ESOPHAGITIS WITH LARGE ESOPHAGEAL ULCER, HIATAL HERNIA, RETAINED FOOD   • ENDOSCOPY N/A 6/20/2020    Procedure: ESOPHAGOGASTRODUODENOSCOPY with biopsies;  Surgeon: Rosanna Ghosh MD;  Location:  [facility];  Service: Gastroenterology;  Laterality: N/A;  pre- hx esophageal tear  post- esophadeal ring, hiatal hernia, gastritis, irregular z line, esophageal diverticumum     ONCOLOGIC/HEMATOLOGIC HISTORY: (History from previous dates can be found in the separate document.)    The patient is a 65 y.o. year old male who has history of prostate cancer post radiation therapy in [place] 2000, more than 15 years ago, and also history of anxiety/depression, and chronic dysphagia, who has complicated medical history this time since his admission on 04/16/2020. This patient presented to the emergency room because of dyspnea and he was found having bilateral pneumonia and respiratory failure. He was actually admitted to the Intensive Care Unit and was on ventilation support. He was tested negative for COVID-19 infection. Nevertheless he had CT scan examination on 04/16/2020 which reported circumferential thickening of the distal esophagus, and also bilateral pulmonary infiltration. The patient was seen by GI service and Juan Babcock MD, did EGD examination on 04/16/2020. This study reported ulceration and mucosal breaks involving at least 75% of the esophageal circumference with esophagitis but no recent bleeding. Biopsy was taken. Pathology evaluation reported no evidence of malignancy. There was moderate active inflammation with eosinophil. The patient had gastric tube placed due to his GI problem.     This patient seems also had stroke evident on brain MRI examination, at subacute infarction of the  lower right cerebellum. There was also post infarct enhancement and evidence of small petechial hemorrhage.      This patient had bilateral venous duplex study in the lower extremities on 04/26/2020 and this study reported right leg acute venous thrombosis in the peroneal vein and left leg acute deep vein thrombosis involving the peroneal and gastrocnemius veins. All other veins were normal bilaterally.      The patient currently has been on Eliquis anticoagulation which I agree with.      The patient reports he had issue with dysphagia for quite a significant time and sometimes with food stuck. Most time he was able to cough it out or swallow it without further incident, however this time prior to the ER visit he really had food stuck and was kind of choking. He was brought up to the emergency room by ambulance.          MEDICATIONS    Current Outpatient Medications:   •  apixaban (ELIQUIS) 5 MG tablet tablet, Take 1 tablet by mouth Every 12 (Twelve) Hours. Indications: Other - full anticoagulation, Disp: 60 tablet, Rfl: 3  •  atorvastatin (Lipitor) 40 MG tablet, Take 1 tablet by mouth Daily., Disp: 30 tablet, Rfl: 5  •  clomiPRAMINE (ANAFRANIL) 50 MG capsule, Take 150 mg by mouth Every Night., Disp: , Rfl:   •  pantoprazole (Protonix) 40 MG EC tablet, Take 1 tablet by mouth 2 (Two) Times a Day., Disp: 60 tablet, Rfl: 2    ALLERGIES:   No Known Allergies    SOCIAL HISTORY:       Social History     Socioeconomic History   • Marital status:      Spouse name: Kelsey   • Number of children: 2   • Years of education: Not on file   • Highest education level: Not on file   Occupational History   • Occupation: retired teacher     Employer: RETIRED   Tobacco Use   • Smoking status: Never Smoker   • Smokeless tobacco: Never Used   Substance and Sexual Activity   • Alcohol use: Yes     Comment: 1 per week   • Drug use: No   • Sexual activity: Defer   Social History Narrative    , two kids, 38, 36    Four  "grandkids         FAMILY HISTORY:  Family History   Problem Relation Age of Onset   • Glaucoma Mother    • Alzheimer's disease Mother    • Hypertension Father    • Diabetes Father    • Liver cancer Father    • Nephrolithiasis Father    • Multiple myeloma Sister    • Other Brother         liver transplant   • Multiple myeloma Brother    • Liver cancer Brother    • Prostate cancer Brother    • Arthritis Other    • Diabetes Other    • Cancer Other    • Malig Hyperthermia Neg Hx        REVIEW OF SYSTEMS:  Review of Systems   Constitutional: Negative for activity change, appetite change, chills, fatigue, fever and unexpected weight change.   HENT: Negative for congestion, hearing loss, nosebleeds and trouble swallowing.    Eyes: Negative for photophobia and visual disturbance.   Respiratory: Negative for cough and shortness of breath.    Cardiovascular: Negative for chest pain, palpitations and leg swelling.   Gastrointestinal: Negative for abdominal distention, blood in stool, diarrhea and nausea.   Endocrine: Negative for cold intolerance and heat intolerance.   Genitourinary: Negative for dysuria, frequency and hematuria.   Musculoskeletal: Negative for arthralgias, back pain and joint swelling.   Skin: Negative for color change and rash.   Allergic/Immunologic: Negative for food allergies and immunocompromised state.   Neurological: Negative for dizziness, syncope, numbness and headaches.        Still has some imbalance from his stroke, however is improving   Hematological: Negative for adenopathy. Does not bruise/bleed easily.   Psychiatric/Behavioral: Negative for agitation and confusion.              Vitals:    07/17/20 1124   BP: 156/99   Pulse: 83   Resp: 16   Temp: 97.3 °F (36.3 °C)   SpO2: 100%   Weight: 82.6 kg (182 lb 1.6 oz)   Height: 182.9 cm (72.01\")   PainSc: 0-No pain     Current Status 7/17/2020   ECOG score 0      PHYSICAL EXAM:      GENERAL:  Well-developed, well-nourished male in no acute distress. " Patient is wearing a facemask due to coronavirus pandemic.   SKIN:  Warm, dry without rashes, purpura or petechiae.  HEAD:  Normocephalic.  EYES:  Pupils equal, round.  EOMs intact.  Conjunctivae normal.  EARS:  Hearing intact.  NECK:  Supple; no thyromegaly or masses.  LYMPHATICS:  No cervical, supraclavicular, axillary adenopathy.  CHEST:  Lungs clear to auscultation. Good airflow.  Normal respiratory effort.  CARDIAC:  Regular rate and rhythm without murmurs. Normal S1,S2.  ABDOMEN:  Soft, nontender with no organomegaly or masses.  Bowel sounds normal.    EXTREMITIES:  No clubbing, cyanosis or edema.  NEUROLOGICAL:  Cranial Nerves II-XII grossly intact.  No focal neurological deficits.  PSYCHIATRIC:  Normal affect and mood.        RECENT LABS:  Lab Results   Component Value Date    WBC 6.93 07/17/2020    HGB 14.7 07/17/2020    HCT 44.5 07/17/2020    MCV 79.3 07/17/2020     07/17/2020     Lab Results   Component Value Date    NEUTROABS 4.07 07/17/2020       PATHOLOGY:   Final Diagnosis   1. Gastric Antrum and Body Biopsy: Benign gastric mucosa with                A. Mild chronic inflammation.               B. No H. pylori-like organisms identified by routine staining.     2. GE Junction Biopsy: Benign squamous and glandular mucosa with               A. Mild chronic inflammation with rare eosinophil and changes consistent with chronic reflux               B. Focally suspicious for rare intestinal metaplasia by routine staining (see comment).               C. Reactive change noted, no dysplasia nor malignancy identified.     3. Mid Esophageal Biopsy: Benign squamous mucosa with                A. Minimally active inflammation with marked eosinophilia (see comment).               B. No intestinal metaplasia identified by routine staining.               C. No microorganisms identified by routine staining.               D. No dysplasia nor malignancy identified.            Assessment/Plan     ASSESSMENT:  1.  Subacute stroke involving the right cerebellum, and also bilateral lower extremity calf vein thrombosis in April 2020.    · His brain MRI on 4/24/2020 also reported chronic small vascular ischemic disease.    · Hypercoagulable work-up in April 2020 was completely negative.    · Patient currently is on Eliquis anticoagulation since the evening of 04/27/2020 after initial Lovenox treatment.   · Venous Doppler study on 7/10/2020 reported resolution of bilateral lower extremity calf vein thrombosis.    · I discussed with the patient today 7/17/2020 about his laboratory work-up which is complete and negative for hypercoagulable status in April 2020 when he suffered a significant stroke in the cerebellum.  Despite had a complete resolution of the calf vein thrombosis, I think his stroke in the cerebellum was significant.  He needs long-term anticoagulation.  I recommended continue anticoagulation.  We will see what the recommendation from neurology will be.     2.  Anemia in April 2020 during his hospitalization.   · Laboratory studies showed appropriate ferritin level 189 and slightly low iron saturation 12% in April 2020.  Normal vitamin B12 level and folate level.   · Patient has normalized hemoglobin 14.7 on 7/17/2020.    3. Esophageal ulcer status post EGD on 04/16/2020, biopsy sample showed no evidence of malignancy.   · The patient was followed by GI service, started on Protonix.  · Repeated EGD examination 6/20/2020 which reported esophagitis and gastritis.  No esophageal ulcer.      PLAN:  1. Continue Eliquis 5 mg twice a day.  This patient will need a long-term anticoagulation.  Will consider opinion from neurology service.   2. Continue follow-up with neurology service.    3. Continue follow-up with cardiology service.  4. Continue Protonix, and follow-up with GI service.   5. Follow-up with me in 6 months with labs- CBC check.  6. Call the office with any worsening symptoms or CP, SOA.     By signing my name  here, I Derrek Mcgarry, attest that all documentation on 07/17/20 at 12:44 has been prepared under the direction and in the presence of Dr. Salvador Streeter MD.    I reviewed the note scribed by Derrek Mcgarry and made appropriate corrections.    More than 40 min were used for face-to-face patient care, over half of that time were for counseling.    SALVADOR STREETER M.D., Ph.D.        CC:  RONNA Wilson.    MD Bakari Nelson MD / RONNA Garcia MD

## 2020-07-31 ENCOUNTER — OFFICE VISIT (OUTPATIENT)
Dept: NEUROLOGY | Facility: CLINIC | Age: 65
End: 2020-07-31

## 2020-07-31 VITALS
OXYGEN SATURATION: 97 % | HEIGHT: 72 IN | HEART RATE: 83 BPM | DIASTOLIC BLOOD PRESSURE: 78 MMHG | WEIGHT: 182 LBS | SYSTOLIC BLOOD PRESSURE: 120 MMHG | BODY MASS INDEX: 24.65 KG/M2

## 2020-07-31 DIAGNOSIS — I63.9 CEREBELLAR INFARCT (HCC): Primary | ICD-10-CM

## 2020-07-31 PROCEDURE — 99213 OFFICE O/P EST LOW 20 MIN: CPT | Performed by: NURSE PRACTITIONER

## 2020-07-31 RX ORDER — PANTOPRAZOLE SODIUM 40 MG/1
40 TABLET, DELAYED RELEASE ORAL
Qty: 90 TABLET | Refills: 1 | Status: SHIPPED | OUTPATIENT
Start: 2020-07-31 | End: 2021-01-22 | Stop reason: SDUPTHER

## 2020-07-31 NOTE — TELEPHONE ENCOUNTER
Message sent to Dr Ghosh regarding refilling of pantoprazole.   Pt does have f/u scheduled for 08/12 with Dr Ghosh.

## 2020-07-31 NOTE — PROGRESS NOTES
DOS: 2020  NAME: Leonardo Youssef   : 1955  PCP: Yuliya Condon APRN    Chief Complaint   Patient presents with   • Stroke      Referring MD: No ref. provider found    Neurological Problem:  65 y.o. right-handed male with a Hx of prostate cancer, OCD, anxiety and depression, radiation therapy, DVT who presents today for stroke follow-up.  Patient is not accompanied by any family.  History is better by the patient and review of records as summarized below.    Interval History:  Mr. Youssef presented to [facility] on 2020 with complaints of abdominal pain, nausea, and vomiting.  He reports a few days he was driving his car and started to feel lightheaded and became extremely diaphoretic almost passing out.  He had a CT of his abdomen that showed concerns for esophageal carcinoma therefore he then underwent an EGD that revealed esophageal ulcers, esophagitis with esophageal tear, but no evidence of malignancy.  During the procedure he became hypotensive and was transferred to the ICU.  He had a very complicated admission and was treated for respiratory failure requiring intubation, aspiration pneumonia, CHF, and encephalopathy.  On  he underwent a CT head that revealed a completed acute/subacute 4 cm right cerebellar stroke without hemorrhage or significant mass-effect therefore we were consulted.  He was not a TPA or interventional candidate due to time criteria and no LVO.  MRI brain obtained and revealed an acute/subacute lower right cerebellar, cerebellar vermis, medial left cerebellum with apparent hyperemia/enhancement and evidence of small petechial hemorrhage, moderate to severe small vessel disease.  CTA head and neck revealed no hemodynamic significant focal stenosis, aneurysm, or dissection in the cervical or vertebral arteries or the arteries at the base of the brain,.  He did have an abnormal opacified right anterior inferior cerebellar artery or left posterior  inferior cerebellar artery felt to be either extremely diminutive in size or occlusion of both arteries. Cardiology was also consulted due to recurrent exacerbation of CHF and an abnormal EKG. 2D echo showed a normal LA size, saline test negative, LV systolic function normal, EF 62%, no AV stenosis present.  Bilateral lower extremity duplex obtained and revealed an acute RLE DVT in the peroneal and an acute LLE DVT in the peroneal and gastrocnemius.  He was ultimately placed on Eliquis 5 mg twice daily.  Cardiology scheduled a cardiac cath and this revealed noncritical disease involving the left main and LAD as well as the RCA, normal LV function, abnormal electrocardiogram.  We sent a hypercoagulable work-up given his acute DVT in the setting of his cryptogenic stroke which did come back normal.  We recommended continuing Eliquis and high-dose statin and for the patient to be discharged home with a ZIO Patch with consideration of Linq if ZIO Patch was unremarkable.    He presents today and has been maintained on Eliquis 5 mg twice daily and Lipitor 40 mg nightly with no new stroke/TIA symptoms.  He completed physical therapy, occupational Therapy, and speech therapy outpatient with great recovery.  He does not remember much from the hospital but reports overall he feels well and has only had occasional episodes of lightheadedness that he notices mostly when he has been outside walking.  He has followed up with cardiology who told him they did not need to see him back for a year.His ZIO Patch  did not reveal any evidence of atrial fibrillation or atrial flutter.  He followed up with hematology who told him to continue his blood thinner.  He does not check his blood pressure but when he was getting it checked with physical therapy it was normal.  He tries to eat a well-balanced diet.  He does get physical activity and will soon start back playing tennis.  He has not had any significant retinal migraines like he had  "in the past.  He followed up with his ophthalmologist who told him that he had a normal eye checkup.  He usually only has about 2-3 migraines a year.  He did fall twice since leaving the hospital once as he was leaving the hospital getting out of the car and another time he tripped.  Now his balance is back to baseline and he has no troubles with ambulation.      Review of Systems:        Review of Systems   Constitutional: Positive for diaphoresis. Negative for activity change, appetite change and unexpected weight change.   HENT: Negative for facial swelling, hearing loss, tinnitus, trouble swallowing and voice change.    Eyes: Negative for photophobia, pain and visual disturbance.   Respiratory: Negative for choking, chest tightness, shortness of breath and wheezing.    Cardiovascular: Negative for chest pain, palpitations and leg swelling.   Gastrointestinal: Negative for abdominal pain, nausea and vomiting.   Endocrine: Negative for polydipsia and polyphagia.   Musculoskeletal: Negative for arthralgias, back pain, gait problem, joint swelling, myalgias, neck pain and neck stiffness.   Neurological: Positive for speech difficulty (word finding) and light-headedness. Negative for dizziness, tremors, seizures, syncope, facial asymmetry, weakness, numbness and headaches.   Hematological: Does not bruise/bleed easily.   Psychiatric/Behavioral: Positive for confusion. Negative for agitation, behavioral problems, decreased concentration, dysphoric mood, hallucinations, self-injury, sleep disturbance and suicidal ideas. The patient is nervous/anxious. The patient is not hyperactive.        \"The following portions of the patient's history were reviewed and updated as appropriate: allergies, current medications, past family history, past medical history, past social history, past surgical history and problem list.\"    Review and Interpretation of Imaging as described below and in the interval history.    4/23 2D echo: LV " function low normal, EF 52%, hypokinetic apical septal and mid inferoseptal, grade 1 with high LAP LV diastolic dysfunction, normal LA size and volume noted, no AV stenosis present.  4/23 CTA Chest: no PE noted.   4/24 MRI brain W/WO: Acute/subacute lower right cerebellar, cerebellar vermis, medial left cerebellum with apparent hyperemia/enhancement and evidence of small petechial hemorrhage, moderate to severe small vessel disease.  4/24 CTA H/N: Acute/Subacute right cerebellar infarct, punctate hypodensities at the lower aspect of the infarct consistent with a minimal petechial hemorrhage, no normally opacified right anterior inferior cerebellar artery or left posterior inferior cerebral artery is noted suspect extremely diminutive size or occlusion of arteries (congenital or flow related), bilateral pleural effusions and pulmonary opacities.  4/25 CTH WO: Right cerebellar infarct again noted with mild mass-effect, no evidence of hemorrhagic conversion, evidence of small vessel disease.  4/27 CTH WO: Evolving area of right cerebellar infarction, no evidence of hemorrhagic conversion.  4/28 CTH WO: Acute to subacute right cerebellar infarct without evidence of hemorrhagic conversion.  VFSS: Mild pharyngeal dysphasia characterized by reduced hyolaryngeal elevation/excursion, mild upper esophageal retention, SLP recommending regular and thins with upright positioning after meals.  4/26 Bilateral LE Duplex: Acute RLE DVT in the peroneal, acute LLE DVT in the peroneal and gastrocnemius.  4/27 Cardiac Cath: Noncritical disease involving the left main and LAD as well as the RCA, normal LV function, abnormal electrocardiogram.  4/28 2D Echo with bubble study: saline test negative.  Zio patch: normal study.      Laboratory Results:             Lab Results   Component Value Date    HGBA1C 5.30 04/25/2020     Lab Results   Component Value Date    CHOL 139 04/25/2020     Lab Results   Component Value Date    HDL 25 (L)  04/25/2020    HDL 42 04/16/2019    HDL 43 12/21/2017     Lab Results   Component Value Date    LDL 81 04/25/2020     (H) 04/16/2019     (H) 12/21/2017     Lab Results   Component Value Date    TRIG 167 (H) 04/25/2020    TRIG 141 04/20/2020    TRIG 80 04/17/2020     No components found for: CHOLHDL  No results found for: RPR  Lab Results   Component Value Date    TSH 3.510 04/28/2020     Lab Results   Component Value Date    WWQGXVBE87 541 04/22/2020     Lab Results   Component Value Date    GLUCOSE 107 (H) 04/29/2020    BUN 26 (H) 04/29/2020    CREATININE 0.83 04/29/2020    EGFRIFNONA 92 04/29/2020    EGFRIFAFRI 82 04/16/2019    BCR 31.3 (H) 04/29/2020    K 3.4 (L) 04/29/2020    CO2 24.0 04/29/2020    CALCIUM 8.8 04/29/2020    PROTENTOTREF 7.4 04/16/2019    ALBUMIN 2.40 (L) 04/23/2020    LABIL2 1.6 04/16/2019    AST 34 04/23/2020    ALT 33 04/23/2020     Lab Results   Component Value Date    WBC 6.93 07/17/2020    HGB 14.7 07/17/2020    HCT 44.5 07/17/2020    MCV 79.3 07/17/2020     07/17/2020     Lab Results   Component Value Date    INR 1.1 04/28/2020       Factor V Leiden normal, Antithrombin , folate 8.41, phosphatidylserine antibodies normal, beta-2 glycoprotein antibodies normal, factor II DNA normal, protein S functional 95, protein C activity 109    Physical Examination:   mRS:   General Appearance:   Tall frail male, pleasant, mildly anxious, well developed, well nourished, well groomed, alert, and cooperative.  HEENT: Normocephalic. Atraumatic. PERRL.   Cardiac: Regular rate and rhythm.   Extremities:    No obvious edema.  Respiratory:   Even and unlabored.    Neurological examination:  Higher Integrative  Function: Oriented to time, place and person. Normal registration, recalled 2/3 memory words, normal attention span and concentration. Normal language including comprehension with some mild delay in responses, spontaneous speech, repetition, reading, writing, naming and  vocabulary. No neglect with normal visual-spatial function and construction. Normal fund of knowledge and higher integrative function.  CN II: Pupils are equal, round, and reactive to light. Normal visual acuity and visual fields.    CN III IV VI: Extraocular movements are full without nystagmus.   CN V: Normal facial sensation and strength of muscles of mastication.  CN VII: Facial movements are symmetric. No weakness.  CN VIII:   Auditory acuity is normal.  CN IX & X:   Symmetric palatal movement.  CN XI: Sternocleidomastoid and trapezius are normal.  No weakness.  CN XII:   The tongue is midline.  No atrophy or fasciculations.  Motor: Normal muscle strength, bulk and tone in upper and lower extremities.  No fasciculations, rigidity, spasticity, or abnormal movements.  Reflexes: 1+ in the upper and lower extremities.   Sensation: Normal to light touch, vibration, temperature, in arms and legs.   Station and Gait: Normal gait and station.    Coordination: Finger to nose test shows no dysmetria.  Rapid alternating movements are normal.      Impression/Assessment:    Mr. Youssef presents today for stroke follow-up.  He suffered a large cryptogenic acute right cerebellar and small left infarcts.  Etiology of his event was unclear as his vessel imaging was unremarkable and cardiac work-up was essentially normal.  I did review his Zile patch results that were normal.  He also had a hypercoagulable panel sent that was also normal.  He has been maintained on Eliquis 5 mg twice daily and Lipitor 40 mg nightly with no new stroke/TIA symptoms.  He is actually made a great recovery given the large size of his strokes.  Remains with some mild word finding difficulty however he also appears to be anxious which I think also plays a role in this.  His MRI also showed evidence of moderate small vessel disease so he is at high risk for developing vascular dementia.  I reviewed his most recent hematology note on 7/17, they recommend  long-term anticoagulation and I do agree with this.  Given the large size of his stroke with subsequent acute bilateral DVTs, I think long-term anticoagulation is warranted.  Interestingly his echo did not show any evidence of a PFO however he did not undergo a  in the hospital but did undergo a cardiac cath.  I reviewed office note from Dr. John on 6/25, apparently patient had a thoracic a sending aortic aneurysm noted but no need to intervene at this time and he recommended a follow-up CT and to see him back in a year.  I think if he were to have a secondary event while on anticoagulation a  needs to be performed.  He has been complaining of some occasional lightheadedness with head movement or exertion with physical activity.  We discussed orthostatic precautions and if this were to worsen he will inform me and his cardiology team.  His biopsy from his EGD did not reveal any evidence of malignancy.  I encouraged him to follow-up with his PCP to discuss anxiolytic options as he is quite anxious about his medical history. We discussed at length his personal risk factors for stroke and importance of risk factor control for stroke prevention, including BP and cholesterol control.  He will monitor BP regularly and follow-up with PCP for continued cholesterol surveillance.  We discussed stroke/TIA symptoms and importance of calling 911 if he were to experience any of these.  Follow-up in 1 year, sooner if symptoms warrant.    Plan:     Continue Eliquis  Continue Lipitor, goal LDL <70.  Follow-up with PCP for anxiety treatments.  Monitor BP regularly  Keep well-hydrated  Encourage regular physical activity  Falls precautions discussed  Orthostatic precautions.  Maintain well-balanced diet   Blood pressure control to <130/80   Goal LDL <70-recommend high dose statins-    Serum glucose < 140   Call 911 for stroke any stroke symptoms   Follow-up in 1 year, sooner if symptoms warrant.  Leonardo was seen today for  stroke.    Diagnoses and all orders for this visit:    Cerebellar infarct (CMS/HCC)        MDM  Reviewed: previous chart and vitals  Reviewed previous: labs, MRI, CT scan and ECG  Interpretation: labs, CT scan and MRI        RONNA Garcia

## 2020-08-12 ENCOUNTER — OFFICE VISIT (OUTPATIENT)
Dept: GASTROENTEROLOGY | Facility: CLINIC | Age: 65
End: 2020-08-12

## 2020-08-12 VITALS — HEIGHT: 72 IN | BODY MASS INDEX: 25 KG/M2 | TEMPERATURE: 97.7 F | WEIGHT: 184.6 LBS

## 2020-08-12 DIAGNOSIS — S11.21XS TEAR OF ESOPHAGUS, SEQUELA: ICD-10-CM

## 2020-08-12 DIAGNOSIS — R13.19 ESOPHAGEAL DYSPHAGIA: Primary | ICD-10-CM

## 2020-08-12 DIAGNOSIS — K21.00 GASTROESOPHAGEAL REFLUX DISEASE WITH ESOPHAGITIS: ICD-10-CM

## 2020-08-12 DIAGNOSIS — K20.0 ESOPHAGITIS, EOSINOPHILIC: ICD-10-CM

## 2020-08-12 PROCEDURE — 99214 OFFICE O/P EST MOD 30 MIN: CPT | Performed by: INTERNAL MEDICINE

## 2020-08-12 NOTE — PROGRESS NOTES
Chief Complaint   Patient presents with   • Follow-up     post scopes   • Difficulty Swallowing     esophageal tear     Subjective     HPI  Leonardo Youssef is a 65 y.o. male who presents for follow-up.  He sustained an esophageal tear in April of this year.  He had repeat EGD on June 20 showing changes consistent with eosinophilic esophagitis, widely patent Schatzki's ring, and some gastritis.    Since that time, he has been doing well.  He is down to pantoprazole once per day.  He has not had any dysphagia episodes.  He still avoids hard foods such as corn chips which tend to cause some discomfort with swallowing.  He is otherwise maintaining a stable weight.  He is not having any adverse effects from the pantoprazole.    Reports that bowel movements are stable.  He reports a recent Cologuard for screening.  No family history of GI malignancies.    He is back on the Eliquis for history of stroke, DVT.  He follows with neurology.  Still having a few residual issues from his stroke, sometimes with word finding but mostly doing well.    Today's visit was in the office.  Both the patient and I were wearing face masks and proper hand hygiene was performed before and after the physical exam.        Past Medical History:   Diagnosis Date   • Anxiety    • Aspiration pneumonia (CMS/HCC)    • Depression    • DVT, bilateral lower limbs (CMS/HCC)    • Dysphagia    • Falls     2 falls due to ( 1) balance  (2) tripped   • Hx of radiation therapy    • Kidney stone    • Lightheadedness    • OCD (obsessive compulsive disorder)    • Prostate cancer (CMS/HCC)     prostate   • Stroke (cerebrum) (CMS/HCC) 05/2020       Social History     Socioeconomic History   • Marital status:      Spouse name: Kelsey   • Number of children: 2   • Years of education: Not on file   • Highest education level: Not on file   Occupational History   • Occupation: retired teacher     Employer: RETIRED   Tobacco Use   • Smoking status: Never Smoker   •  "Smokeless tobacco: Never Used   Substance and Sexual Activity   • Alcohol use: Yes     Comment: 1 per week   • Drug use: No   • Sexual activity: Defer   Social History Narrative    , two kids, 38, 36    Four grandkids         Current Outpatient Medications:   •  apixaban (ELIQUIS) 5 MG tablet tablet, Take 1 tablet by mouth Every 12 (Twelve) Hours. Indications: Other - full anticoagulation, Disp: 60 tablet, Rfl: 3  •  atorvastatin (Lipitor) 40 MG tablet, Take 1 tablet by mouth Daily., Disp: 30 tablet, Rfl: 5  •  clomiPRAMINE (ANAFRANIL) 50 MG capsule, Take 150 mg by mouth Every Night., Disp: , Rfl:   •  pantoprazole (PROTONIX) 40 MG EC tablet, Take 1 tablet by mouth Every Morning Before Breakfast., Disp: 90 tablet, Rfl: 1    Review of Systems   Constitutional: Negative for activity change, appetite change and fatigue.   HENT: Negative for sore throat and trouble swallowing.    Respiratory: Negative.    Cardiovascular: Negative.    Gastrointestinal: Negative for abdominal distention, abdominal pain, blood in stool and constipation.   Endocrine: Negative for cold intolerance and heat intolerance.   Genitourinary: Negative for difficulty urinating, dysuria and frequency.   Musculoskeletal: Negative for arthralgias, back pain and myalgias.   Skin: Negative.    Neurological: Positive for speech difficulty. Negative for weakness.   Hematological: Negative for adenopathy. Does not bruise/bleed easily.   All other systems reviewed and are negative.      Objective   Vitals:    08/12/20 1405   Temp: 97.7 °F (36.5 °C)         08/12/20  1405   Weight: 83.7 kg (184 lb 9.6 oz)     Body mass index is 25.04 kg/m².          Temp 97.7 °F (36.5 °C)   Ht 182.9 cm (72\")   Wt 83.7 kg (184 lb 9.6 oz)   BMI 25.04 kg/m²     General Appearance:  Alert, cooperative, no distress, appears stated age   Head:  Normocephalic, without obvious abnormality, atraumatic   Eyes:  PERRL, conjunctiva/corneas clear, EOM's intact   Ears:  Normal " external appearance of ear, hearing intact   Nose: Nares normal,mucosa normal, no drainage or sinus tenderness   Throat: Lips, mucosa, and tongue normal; teeth and gums normal   Neck: Supple, symmetrical, trachea midline, no adenopathy;  thyroid: not enlarged, symmetric, no tenderness/mass/nodule   Back:   Symmetric, no curvature, ROM normal   Lungs:   Clear to auscultation bilaterally, respirations unlabored, effort normal   Heart:  Regular rate and rhythm, S1 and S2 normal, no murmur, rub, or gallop, no lower extremity edema   Abdomen:   Soft, non-tender, bowel sounds active all four quadrants,  no masses, no organomegaly   Rectal: Deferred   Musculoskeletal: Normal gait, normal station, no atrophy of extremities, using walker   Skin: Normal color, texture, and turgor, no rashes or lesions   Lymph nodes: Cervical and supraclavicular nodes normal   Psychiatric: Alert and oriented x 3, normal mood and affect,  normal recent and remote memory, normal judgment and insight         WBC   Date Value Ref Range Status   07/17/2020 6.93 3.40 - 10.80 10*3/mm3 Final   04/16/2019 4.77 3.40 - 10.80 10*3/mm3 Final     RBC   Date Value Ref Range Status   07/17/2020 5.61 4.14 - 5.80 10*6/mm3 Final   04/16/2019 5.60 4.14 - 5.80 10*6/mm3 Final     Hemoglobin   Date Value Ref Range Status   07/17/2020 14.7 13.0 - 17.7 g/dL Final     Hematocrit   Date Value Ref Range Status   07/17/2020 44.5 37.5 - 51.0 % Final     MCV   Date Value Ref Range Status   07/17/2020 79.3 79.0 - 97.0 fL Final     MCH   Date Value Ref Range Status   07/17/2020 26.2 (L) 26.6 - 33.0 pg Final     MCHC   Date Value Ref Range Status   07/17/2020 33.0 31.5 - 35.7 g/dL Final     RDW   Date Value Ref Range Status   07/17/2020 15.8 (H) 12.3 - 15.4 % Final     RDW-SD   Date Value Ref Range Status   07/17/2020 45.0 37.0 - 54.0 fl Final     MPV   Date Value Ref Range Status   07/17/2020 11.0 6.0 - 12.0 fL Final     Platelets   Date Value Ref Range Status   07/17/2020  219 140 - 450 10*3/mm3 Final     Neutrophil %   Date Value Ref Range Status   07/17/2020 58.7 42.7 - 76.0 % Final     Lymphocyte %   Date Value Ref Range Status   07/17/2020 23.1 19.6 - 45.3 % Final     Monocyte %   Date Value Ref Range Status   07/17/2020 10.0 5.0 - 12.0 % Final     Eosinophil %   Date Value Ref Range Status   07/17/2020 6.6 (H) 0.3 - 6.2 % Final     Basophil %   Date Value Ref Range Status   07/17/2020 0.9 0.0 - 1.5 % Final     Immature Grans %   Date Value Ref Range Status   07/17/2020 0.7 (H) 0.0 - 0.5 % Final     Neutrophils, Absolute   Date Value Ref Range Status   07/17/2020 4.07 1.70 - 7.00 10*3/mm3 Final     Lymphocytes, Absolute   Date Value Ref Range Status   07/17/2020 1.60 0.70 - 3.10 10*3/mm3 Final     Monocytes, Absolute   Date Value Ref Range Status   07/17/2020 0.69 0.10 - 0.90 10*3/mm3 Final     Eosinophils, Absolute   Date Value Ref Range Status   07/17/2020 0.46 (H) 0.00 - 0.40 10*3/mm3 Final     Basophils, Absolute   Date Value Ref Range Status   07/17/2020 0.06 0.00 - 0.20 10*3/mm3 Final     Immature Grans, Absolute   Date Value Ref Range Status   07/17/2020 0.05 0.00 - 0.05 10*3/mm3 Final     nRBC   Date Value Ref Range Status   07/17/2020 0.0 0.0 - 0.2 /100 WBC Final       Lab Results   Component Value Date    GLUCOSE 107 (H) 04/29/2020    BUN 26 (H) 04/29/2020    CREATININE 0.83 04/29/2020    EGFRIFNONA 92 04/29/2020    EGFRIFAFRI 82 04/16/2019    BCR 31.3 (H) 04/29/2020    CO2 24.0 04/29/2020    CALCIUM 8.8 04/29/2020    PROTENTOTREF 7.4 04/16/2019    ALBUMIN 2.40 (L) 04/23/2020    LABIL2 1.6 04/16/2019    AST 34 04/23/2020    ALT 33 04/23/2020         Imaging Results (Last 7 Days)     ** No results found for the last 168 hours. **            Assessment/Plan    Eosinophilic esophagitis: Diagnosed on biopsies from his June EGD.  He is doing well on once daily PPI    Esophageal dysphagia: Improved, stable.  Secondary to above    Esophageal tear: Secondary to getting food  farbia.  Healed by June's EGD    GERD: Evidence from biopsies from his GE junction.  Stable on PPI    Plan  Continue pantoprazole every day  Monitor swallowing  Notify me if he develops issues with swallowing again, will need repeat EGD with possible dilatation  If otherwise doing okay follow-up in 1 year.    Leonardo was seen today for follow-up and difficulty swallowing.    Diagnoses and all orders for this visit:    Esophageal dysphagia    Tear of esophagus, sequela    Esophagitis, eosinophilic    Gastroesophageal reflux disease with esophagitis        Dictated utilizing Dragon dictation

## 2020-08-15 ENCOUNTER — HOSPITAL ENCOUNTER (EMERGENCY)
Facility: HOSPITAL | Age: 65
Discharge: HOME | End: 2020-08-15
Attending: EMERGENCY MEDICINE | Admitting: EMERGENCY MEDICINE

## 2020-08-15 VITALS
SYSTOLIC BLOOD PRESSURE: 141 MMHG | TEMPERATURE: 98.4 F | RESPIRATION RATE: 16 BRPM | WEIGHT: 185 LBS | HEIGHT: 72 IN | BODY MASS INDEX: 25.06 KG/M2 | HEART RATE: 111 BPM | DIASTOLIC BLOOD PRESSURE: 76 MMHG | OXYGEN SATURATION: 99 %

## 2020-08-15 DIAGNOSIS — R21 RASH: Primary | ICD-10-CM

## 2020-08-15 PROCEDURE — 99282 EMERGENCY DEPT VISIT SF MDM: CPT

## 2020-08-15 RX ORDER — SULFAMETHOXAZOLE AND TRIMETHOPRIM 800; 160 MG/1; MG/1
1 TABLET ORAL 2 TIMES DAILY
Qty: 20 TABLET | Refills: 0 | Status: SHIPPED | OUTPATIENT
Start: 2020-08-15 | End: 2020-08-25

## 2020-08-15 NOTE — ED NOTES
Pt reports a possibly infected spider bite on his forehead that has been present 4-5 days. Denies fever. Pt states the area is red and swollen w/ drainage.      Sabrina Kam, PATRICIA  08/15/20 [id number]

## 2020-08-15 NOTE — ED PROVIDER NOTES
EMERGENCY DEPARTMENT ENCOUNTER    Room Number:  14/14  Date of encounter:  8/15/2020  PCP: Yuliya Condon APRN    HPI:  Context: Leonardo Youssef is a 65 y.o. male who presents to the ED c/o chief complaint of rash on his forehead.  Patient reports that rash initially started 4 to 5 days prior, was initially small vesicles which he states is similar to poison ivy that he has had in the past.  Patient states he walks in the woods regularly with his dog and has had poison ivy numerous times in the past.  Patient popped the vesicles but since then has had increased swelling as well as some faint redness in the surrounding area.  Patient was seen at urgent care and referred to the emergency department for evaluation.  Patient denies any eye swelling or pain, no difficulty moving his eyes, no visual disturbances, no fevers or systemic symptoms.  Patient has no history of cellulitis, abscesses, or MRSA in the past. No history of diabetes, no history of chronic kidney disease, no history of HIV or immune suppression.  The patient is not on chronic steroids.  The patient is not being treated for cancer.  Patient denies history of IV drug abuse.      MEDICAL HISTORY REVIEW  Reviewed in EPIC    PAST MEDICAL HISTORY  Active Ambulatory Problems     Diagnosis Date Noted   • Breast tenderness in male 12/04/2017   • Family history of multiple myeloma 04/16/2019   • OCD (obsessive compulsive disorder) 04/16/2019   • Anxiety and depression 04/16/2019   • Epigastric pain 04/16/2020   • Abnormal CT of the abdomen 04/16/2020   • Acute kidney injury superimposed on chronic kidney disease (CMS/HCC) 04/16/2020   • Acute respiratory failure with hypoxia (CMS/HCC) 04/16/2020   • Aspiration pneumonia (CMS/HCC) 04/16/2020   • Sepsis associated hypotension (CMS/[facility]) 04/16/2020   • Dysphagia 04/16/2020   • Hypophosphatemia 04/17/2020   • PICC (peripherally inserted central catheter) in place 04/17/2020   • Acute urinary retention  04/17/2020   • Esophageal tear 04/18/2020   • Acute deep vein thrombosis (DVT) of lower extremity (CMS/[facility]) 04/27/2020   • Cerebellar infarct (CMS/[facility]) 04/27/2020   • Anemia 04/28/2020   • Esophageal dysphagia 05/22/2020   • Esophagus tear 05/22/2020   • Esophagitis, eosinophilic 08/12/2020   • Gastroesophageal reflux disease with esophagitis 08/12/2020     Resolved Ambulatory Problems     Diagnosis Date Noted   • Leukocytosis 04/16/2020   • Fever 04/16/2020     Past Medical History:   Diagnosis Date   • Anxiety    • Depression    • DVT, bilateral lower limbs (CMS/[facility])    • Falls    • Hx of radiation therapy    • Kidney stone    • Lightheadedness    • Prostate cancer (CMS/[facility])    • Stroke (cerebrum) (CMS/[facility]) 05/2020       PAST SURGICAL HISTORY  Past Surgical History:   Procedure Laterality Date   • CARDIAC CATHETERIZATION N/A 4/27/2020    Procedure: Left Heart Cath;  Surgeon: Remi Jonh MD;  Location: [facility] CATH INVASIVE LOCATION;  Service: Cardiovascular;  Laterality: N/A;   • CARDIAC CATHETERIZATION N/A 4/27/2020    Procedure: Coronary angiography;  Surgeon: Remi John MD;  Location: [facility] CATH INVASIVE LOCATION;  Service: Cardiovascular;  Laterality: N/A;   • CARDIAC CATHETERIZATION N/A 4/27/2020    Procedure: Left ventriculography;  Surgeon: Remi John MD;  Location: [facility] CATH INVASIVE LOCATION;  Service: Cardiovascular;  Laterality: N/A;   • ENDOSCOPY N/A 4/16/2020    Procedure: ESOPHAGOGASTRODUODENOSCOPY WITH COLD BIOPSIES;  Surgeon: Renetta Oden MD;  Location: [facility] ENDOSCOPY;  Service: Gastroenterology;  Laterality: N/A;  PRE- DYSPHAGIA, ABNORMAL CT  POST- ESOPHAGITIS WITH LARGE ESOPHAGEAL ULCER, HIATAL HERNIA, RETAINED FOOD   • ENDOSCOPY N/A 6/20/2020    Procedure: ESOPHAGOGASTRODUODENOSCOPY with biopsies;  Surgeon: Rosanna Ghosh MD;  Location: [facility] ENDOSCOPY;  Service: Gastroenterology;  Laterality: N/A;  pre- hx esophageal tear  post- esophadeal ring, hiatal  hernia, gastritis, irregular z line, esophageal diverticumum   • UPPER GASTROINTESTINAL ENDOSCOPY  07/2020    esophageal tear       FAMILY HISTORY  Family History   Problem Relation Age of Onset   • Glaucoma Mother    • Alzheimer's disease Mother    • Hypertension Father    • Diabetes Father    • Liver cancer Father    • Nephrolithiasis Father    • Multiple myeloma Sister    • Other Brother         liver transplant   • Multiple myeloma Brother    • Liver cancer Brother    • Prostate cancer Brother    • Arthritis Other    • Diabetes Other    • Cancer Other    • Malig Hyperthermia Neg Hx        SOCIAL HISTORY  Social History     Socioeconomic History   • Marital status:      Spouse name: Kelsey   • Number of children: 2   • Years of education: Not on file   • Highest education level: Not on file   Occupational History   • Occupation: retired teacher     Employer: RETIRED   Tobacco Use   • Smoking status: Never Smoker   • Smokeless tobacco: Never Used   Substance and Sexual Activity   • Alcohol use: Yes     Comment: 1 per week   • Drug use: No   • Sexual activity: Defer   Social History Narrative    , two kids, 38, 36    Four grandkids       ALLERGIES  Patient has no known allergies.    The patient's allergies have been reviewed    REVIEW OF SYSTEMS  All systems reviewed and negative except for those discussed in HPI.     PHYSICAL EXAM  I have reviewed the triage vital signs and nursing notes.  ED Triage Vitals [08/15/20 1314]   Temp Heart Rate Resp BP SpO2   98.4 °F (36.9 °C) 111 16 -- 99 %      Temp src Heart Rate Source Patient Position BP Location FiO2 (%)   Tympanic -- -- -- --     GENERAL: No acute distress  HENT: NCAT, PERRL, Nares patent  EYES: no scleral icterus  NECK: trachea midline, no ROM limitations  CV: regular rhythm, regular rate  RESPIRATORY: normal effort  ABDOMEN: soft  : deferred  MUSCULOSKELETAL: no deformity  NEURO: alert, moves all extremities, follows commands  SKIN: warm, dry.   Patient has a rash with 7 small ulcerated lesions in mid forehead at the hairline with mild surrounding swelling and faint karla erythema.  No induration, no warmth, no fluctuance or drainage.  No involvement near the eyes or nose.    LAB RESULTS  No results found for this or any previous visit (from the past 24 hour(s)).    I ordered the above labs and reviewed the results.    RADIOLOGY  No Radiology Exams Resulted Within Past 24 Hours    I ordered the above noted radiological studies. I reviewed the images and results. I agree with the radiologist interpretation.    PROCEDURES  Procedures    MEDICATIONS GIVEN IN ER  Medications - No data to display    PROGRESS, DATA ANALYSIS, CONSULTS, AND MEDICAL DECISION MAKING  A complete history and physical exam have been performed.  All available laboratory and imaging results have been reviewed by myself prior to disposition.  Patient was placed in face mask in first look. Patient was wearing facemask when I entered the room and throughout our encounter. I wore full protective equipment throughout this patient encounter including a face mask, and gloves. Hand hygiene was performed before donning protective equipment and after removal when leaving the room.  MDM  After the initial H&P, I discussed pertinent information from history and physical exam with patient/family.  Discussed differential diagnosis.  Discussed plan for ED evaluation/work-up/treatment.  All questions answered.  Patient/family is agreeable with plan.  ED Course as of Aug 15 1339   Sat Aug 15, 2020   1334 Rash, likely contact dermatitis, mild surrounding erythema, possibly secondary to inflammation versus bacterial infection.  Despite patient's age he has no significant comorbidities for infection, no alarm signs or symptoms.  Will write prescription for antibiotics.  Discussed need for close follow-up with primary care, given dermatologist as needed, extensive discussion of return precautions.  Patient  understands to return to emergency department if symptoms continue or worsen.  Plan for discharge.  Patient comfortable with plan, no questions or concerns.    [JG]      ED Course User Index  [JG] Valdez Fournier MD       AS OF 13:39 VITALS:    BP -    HR - 111  TEMP - 98.4 °F (36.9 °C) (Tympanic)  O2 SATS - 99%    DIAGNOSIS  Final diagnoses:   Rash         DISPOSITION  DISCHARGE    Patient discharged in stable condition.    Reviewed implications of results, diagnosis, meds, responsibility to follow up, warning signs and symptoms of possible worsening, potential complications and reasons to return to ER.    Patient/Family voiced understanding of above instructions.    Discussed plan for discharge, as there is no emergent indication for admission. Patient referred to primary care provider for BP management due to today's BP. Pt/family is agreeable and understands need for follow up and repeat testing.  Pt is aware that discharge does not mean that nothing is wrong but it indicates no emergency is present that requires admission and they must continue care with follow-up as given below or physician of their choice.     FOLLOW-UP  Yuliya Condon, APRN  [facility] [facility]  [address] [address]  [phone]    Schedule an appointment as soon as possible for a visit in 2 days  even if well, For wound re-check    [facility] DERMATOLOGY  [address] [address]  [phone]  Schedule an appointment as soon as possible for a visit in 2 days           Medication List      New Prescriptions    sulfamethoxazole-trimethoprim 800-160 MG per tablet  Commonly known as:  BACTRIM DS,SEPTRA DS  Take 1 tablet by mouth 2 (Two) Times a Day for 10 days.           Valdez Fournier MD  08/15/20 6554

## 2020-10-29 RX ORDER — APIXABAN 5 MG/1
TABLET, FILM COATED ORAL
Qty: 60 TABLET | Refills: 3 | Status: SHIPPED | OUTPATIENT
Start: 2020-10-29 | End: 2021-01-15 | Stop reason: SDUPTHER

## 2020-12-17 RX ORDER — ATORVASTATIN CALCIUM 40 MG/1
TABLET, FILM COATED ORAL
Qty: 30 TABLET | Refills: 5 | Status: SHIPPED | OUTPATIENT
Start: 2020-12-17 | End: 2021-04-20

## 2021-01-15 ENCOUNTER — OFFICE VISIT (OUTPATIENT)
Dept: ONCOLOGY | Facility: CLINIC | Age: 66
End: 2021-01-15

## 2021-01-15 ENCOUNTER — LAB (OUTPATIENT)
Dept: LAB | Facility: HOSPITAL | Age: 66
End: 2021-01-15

## 2021-01-15 VITALS
OXYGEN SATURATION: 100 % | SYSTOLIC BLOOD PRESSURE: 130 MMHG | RESPIRATION RATE: 20 BRPM | DIASTOLIC BLOOD PRESSURE: 83 MMHG | HEIGHT: 72 IN | HEART RATE: 88 BPM | BODY MASS INDEX: 25.12 KG/M2 | WEIGHT: 185.5 LBS | TEMPERATURE: 98 F

## 2021-01-15 DIAGNOSIS — Z79.01 ANTICOAGULATION ADEQUATE: ICD-10-CM

## 2021-01-15 DIAGNOSIS — I63.9 CEREBELLAR INFARCT (HCC): Primary | ICD-10-CM

## 2021-01-15 DIAGNOSIS — I82.409 ACUTE DEEP VEIN THROMBOSIS (DVT) OF LOWER EXTREMITY, UNSPECIFIED LATERALITY, UNSPECIFIED VEIN (HCC): ICD-10-CM

## 2021-01-15 DIAGNOSIS — Z86.718 HISTORY OF DEEP VEIN THROMBOSIS (DVT) OF LOWER EXTREMITY: ICD-10-CM

## 2021-01-15 LAB
ALBUMIN SERPL-MCNC: 4.3 G/DL (ref 3.5–5.2)
ALBUMIN/GLOB SERPL: 1.3 G/DL (ref 1.1–2.4)
ALP SERPL-CCNC: 71 U/L (ref 38–116)
ALT SERPL W P-5'-P-CCNC: 25 U/L (ref 0–41)
ANION GAP SERPL CALCULATED.3IONS-SCNC: 9.4 MMOL/L (ref 5–15)
AST SERPL-CCNC: 30 U/L (ref 0–40)
BASOPHILS # BLD AUTO: 0.07 10*3/MM3 (ref 0–0.2)
BASOPHILS NFR BLD AUTO: 1 % (ref 0–1.5)
BILIRUB SERPL-MCNC: 0.7 MG/DL (ref 0.2–1.2)
BUN SERPL-MCNC: 19 MG/DL (ref 6–20)
BUN/CREAT SERPL: 16.7 (ref 7.3–30)
CALCIUM SPEC-SCNC: 9.9 MG/DL (ref 8.5–10.2)
CHLORIDE SERPL-SCNC: 101 MMOL/L (ref 98–107)
CO2 SERPL-SCNC: 27.6 MMOL/L (ref 22–29)
CREAT SERPL-MCNC: 1.14 MG/DL (ref 0.7–1.3)
DEPRECATED RDW RBC AUTO: 43.8 FL (ref 37–54)
EOSINOPHIL # BLD AUTO: 0.33 10*3/MM3 (ref 0–0.4)
EOSINOPHIL NFR BLD AUTO: 4.7 % (ref 0.3–6.2)
ERYTHROCYTE [DISTWIDTH] IN BLOOD BY AUTOMATED COUNT: 14.5 % (ref 12.3–15.4)
GFR SERPL CREATININE-BSD FRML MDRD: 64 ML/MIN/1.73
GLOBULIN UR ELPH-MCNC: 3.4 GM/DL (ref 1.8–3.5)
GLUCOSE SERPL-MCNC: 101 MG/DL (ref 74–124)
HCT VFR BLD AUTO: 44.9 % (ref 37.5–51)
HGB BLD-MCNC: 14.8 G/DL (ref 13–17.7)
IMM GRANULOCYTES # BLD AUTO: 0.01 10*3/MM3 (ref 0–0.05)
IMM GRANULOCYTES NFR BLD AUTO: 0.1 % (ref 0–0.5)
LYMPHOCYTES # BLD AUTO: 1.63 10*3/MM3 (ref 0.7–3.1)
LYMPHOCYTES NFR BLD AUTO: 23.3 % (ref 19.6–45.3)
MCH RBC QN AUTO: 27.7 PG (ref 26.6–33)
MCHC RBC AUTO-ENTMCNC: 33 G/DL (ref 31.5–35.7)
MCV RBC AUTO: 83.9 FL (ref 79–97)
MONOCYTES # BLD AUTO: 0.56 10*3/MM3 (ref 0.1–0.9)
MONOCYTES NFR BLD AUTO: 8 % (ref 5–12)
NEUTROPHILS NFR BLD AUTO: 4.4 10*3/MM3 (ref 1.7–7)
NEUTROPHILS NFR BLD AUTO: 62.9 % (ref 42.7–76)
NRBC BLD AUTO-RTO: 0 /100 WBC (ref 0–0.2)
PLATELET # BLD AUTO: 254 10*3/MM3 (ref 140–450)
PMV BLD AUTO: 9.8 FL (ref 6–12)
POTASSIUM SERPL-SCNC: 5 MMOL/L (ref 3.5–4.7)
PROT SERPL-MCNC: 7.7 G/DL (ref 6.3–8)
RBC # BLD AUTO: 5.35 10*6/MM3 (ref 4.14–5.8)
SODIUM SERPL-SCNC: 138 MMOL/L (ref 134–145)
WBC # BLD AUTO: 7 10*3/MM3 (ref 3.4–10.8)

## 2021-01-15 PROCEDURE — 80053 COMPREHEN METABOLIC PANEL: CPT

## 2021-01-15 PROCEDURE — 36415 COLL VENOUS BLD VENIPUNCTURE: CPT

## 2021-01-15 PROCEDURE — 99213 OFFICE O/P EST LOW 20 MIN: CPT | Performed by: INTERNAL MEDICINE

## 2021-01-15 PROCEDURE — 85025 COMPLETE CBC W/AUTO DIFF WBC: CPT

## 2021-01-15 NOTE — PROGRESS NOTES
.     REASON FOR FOLLOW-UP:     1. Subacute stroke involving the right cerebellum, and also bilateral lower extremity calf vein thrombosis in April 2020.    · Hypercoagulable work-up was completely negative.    · Patient currently is on Eliquis anticoagulation since 04/27/2020.   · Venous Doppler study on 7/10/2020 reported resolution of bilateral lower extremity calf vein thrombosis.    2.  Anemia Hb 8.2 in April 2020 during his hospitalization.    · Laboratory studies showed appropriate ferritin level 189 and slightly low iron saturation 12%.  Normal vitamin B12 level and folate level.  Hemoglobin was improving without a transfusion.  · Anemia resolved on 7/17/2020 with Hb 14.7.  3. Esophageal ulcer status post EGD on 04/16/2020, biopsy sample showed no evidence of malignancy.   · The patient was followed by GI service, started on Protonix.  · Repeated EGD examination 6/20/2020 reported esophagitis and gastritis.  No esophageal ulcer.                              HISTORY OF PRESENT ILLNESS: Patient is a 65 y.o. male who presents today for 6-month follow-up.     Patient reports he has been taking Eliquis anticoagulation 5 mg twice a day with good tolerance.  Denies abnormal bleeding or bruising.  No headaches no vision changes.  No aphasia or extremity weakness.  Denies chest pain or dyspnea or leg edema.  His performance status is ECOG 1-0.    Patient reports he continues taking Protonix.  He denies melena hematochezia.    He has no complaints at this time.     Laboratory studies today on 1/15/2020 showed a normal CBC with Hb 14.8, WBC 7000 platelets 254,000.      Past Medical History:   Diagnosis Date   • Anxiety    • Aspiration pneumonia (CMS/HCC)    • Depression    • DVT, bilateral lower limbs (CMS/HCC)    • Dysphagia    • Falls     2 falls due to ( 1) balance  (2) tripped   • Hx of radiation therapy    • Kidney stone    • Lightheadedness    • OCD (obsessive compulsive disorder)    • Prostate cancer  (CMS/[facility])     prostate   • Stroke (cerebrum) (CMS/[facility]) 05/2020   · Respiratory failure, required ventilation support in April 2020.  · Coronary artery disease/ischemic disease with cardiac catheterization on 4/27/2020.   ·   Past Surgical History:   Procedure Laterality Date   • CARDIAC CATHETERIZATION N/A 4/27/2020    Procedure: Left Heart Cath;  Surgeon: Remi John MD;  Location: [facility] CATH INVASIVE LOCATION;  Service: Cardiovascular;  Laterality: N/A;   • CARDIAC CATHETERIZATION N/A 4/27/2020    Procedure: Coronary angiography;  Surgeon: Remi John MD;  Location: [facility] CATH INVASIVE LOCATION;  Service: Cardiovascular;  Laterality: N/A;   • CARDIAC CATHETERIZATION N/A 4/27/2020    Procedure: Left ventriculography;  Surgeon: Remi John MD;  Location: [facility] CATH INVASIVE LOCATION;  Service: Cardiovascular;  Laterality: N/A;   • ENDOSCOPY N/A 4/16/2020    Procedure: ESOPHAGOGASTRODUODENOSCOPY WITH COLD BIOPSIES;  Surgeon: Renetta Oden MD;  Location: [facility] ENDOSCOPY;  Service: Gastroenterology;  Laterality: N/A;  PRE- DYSPHAGIA, ABNORMAL CT  POST- ESOPHAGITIS WITH LARGE ESOPHAGEAL ULCER, HIATAL HERNIA, RETAINED FOOD   • ENDOSCOPY N/A 6/20/2020    Procedure: ESOPHAGOGASTRODUODENOSCOPY with biopsies;  Surgeon: Rosanna Ghosh MD;  Location: [facility] ENDOSCOPY;  Service: Gastroenterology;  Laterality: N/A;  pre- hx esophageal tear  post- esophadeal ring, hiatal hernia, gastritis, irregular z line, esophageal diverticumum   • UPPER GASTROINTESTINAL ENDOSCOPY  07/2020    esophageal tear     ONCOLOGIC/HEMATOLOGIC HISTORY: (History from previous dates can be found in the separate document.)    The patient is a 65 y.o. year old male who has history of prostate cancer post radiation therapy in [place] 2000, more than 15 years ago, and also history of anxiety/depression, and chronic dysphagia, who has complicated medical history this time since his admission on 04/16/2020. This patient  presented to the emergency room because of dyspnea and he was found having bilateral pneumonia and respiratory failure. He was actually admitted to the Intensive Care Unit and was on ventilation support. He was tested negative for COVID-19 infection. Nevertheless he had CT scan examination on 04/16/2020 which reported circumferential thickening of the distal esophagus, and also bilateral pulmonary infiltration. The patient was seen by GI service and Juan Babcock MD, did EGD examination on 04/16/2020. This study reported ulceration and mucosal breaks involving at least 75% of the esophageal circumference with esophagitis but no recent bleeding. Biopsy was taken. Pathology evaluation reported no evidence of malignancy. There was moderate active inflammation with eosinophil. The patient had gastric tube placed due to his GI problem.     This patient seems also had stroke evident on brain MRI examination, at subacute infarction of the lower right cerebellum. There was also post infarct enhancement and evidence of small petechial hemorrhage.      This patient had bilateral venous duplex study in the lower extremities on 04/26/2020 and this study reported right leg acute venous thrombosis in the peroneal vein and left leg acute deep vein thrombosis involving the peroneal and gastrocnemius veins. All other veins were normal bilaterally.      The patient currently has been on Eliquis anticoagulation which I agree with.      The patient reports he had issue with dysphagia for quite a significant time and sometimes with food stuck. Most time he was able to cough it out or swallow it without further incident, however this time prior to the ER visit he really had food stuck and was kind of choking. He was brought up to the emergency room by ambulance.       I saw him as a consult during his hospitalization in April 2020 after a stroke that required ICU admission from 4/16/2020 to 4/30/2020.    Patient reports he is on  Eliquis anticoagulation 5 mg twice a day, denies bleeding or bruising.  He is recovering from his stroke, he still has some imbalance month but improved.  He denies headaches vision changes.  His performance status is ECOG 1-0.    Patient reports he continues taking Protonix.  He denies melena hematochezia.    He has no complaints at this time.     I reviewed the medical records, and found out that he had repeated EGD examination 6/20/2020 which reported evidence of esophagitis and gastritis.  This is confirmed by by pathology evaluation.      Laboratory studies, on 7/17/2020 reported normalized hemoglobin 14.7.  He has normal platelets 219,000 and WBC 6930 including ANC 4070 lymphocytes 1600.     I summarized the results of hypercoagulable work-up for him.  In April 2020 we obtain comprehensive laboratory work-up during his hospitalization.  He had a negative work-up.  He was tested negative for factor II mutation, factor V Leyden mutation, and a normal Antithrombin activity 103%, protein C activity 109% and protein S activity 95%.  He was tested negative for anticardiolipin antibodies, anti-beta-2 5.2  Antibodies and anti-phosphatidylserine antibodies in the negative for lupus anticoagulant.    Lab results in April 2020 reported ferritin 189, iron saturation 12%, free iron 28 TIBC 228 and vitamin B12 at 541 and RBC folate 1025 ng/mL on 4/22/2020.  Had elevated C-reactive protein 4.27 mg/dL.  His hemoglobin was 10.1, platelets 199,000 and WBC 7180 on the same day.  Serum folate was 8.4 ng/mL on 4/29/2020.          MEDICATIONS    Current Outpatient Medications:   •  atorvastatin (LIPITOR) 40 MG tablet, TAKE 1 TABLET BY MOUTH EVERY DAY, Disp: 30 tablet, Rfl: 5  •  clomiPRAMINE (ANAFRANIL) 50 MG capsule, Take 150 mg by mouth Every Night., Disp: , Rfl:   •  Eliquis 5 MG tablet tablet, TAKE 1 TABLET BY MOUTH EVERY 12 HOURS, Disp: 60 tablet, Rfl: 3  •  pantoprazole (PROTONIX) 40 MG EC tablet, Take 1 tablet by mouth Every  Morning Before Breakfast., Disp: 90 tablet, Rfl: 1    ALLERGIES:   No Known Allergies    SOCIAL HISTORY:       Social History     Socioeconomic History   • Marital status:      Spouse name: Kelsey   • Number of children: 2   • Years of education: Not on file   • Highest education level: Not on file   Occupational History   • Occupation: retired teacher     Employer: RETIRED   Tobacco Use   • Smoking status: Never Smoker   • Smokeless tobacco: Never Used   Substance and Sexual Activity   • Alcohol use: Yes     Comment: 1 per week   • Drug use: No   • Sexual activity: Defer   Social History Narrative    , two kids, 38, 36    Four grandkids         FAMILY HISTORY:  Family History   Problem Relation Age of Onset   • Glaucoma Mother    • Alzheimer's disease Mother    • Hypertension Father    • Diabetes Father    • Liver cancer Father    • Nephrolithiasis Father    • Multiple myeloma Sister    • Other Brother         liver transplant   • Multiple myeloma Brother    • Liver cancer Brother    • Prostate cancer Brother    • Arthritis Other    • Diabetes Other    • Cancer Other    • Malig Hyperthermia Neg Hx        REVIEW OF SYSTEMS:  Review of Systems   Constitutional: Negative for activity change, appetite change, diaphoresis, fatigue, fever and unexpected weight change.   HENT: Negative for congestion, hearing loss, nosebleeds and trouble swallowing.    Eyes: Negative for photophobia and visual disturbance.   Respiratory: Negative for cough and shortness of breath.    Cardiovascular: Negative for chest pain, palpitations and leg swelling.   Gastrointestinal: Negative for abdominal pain, blood in stool, diarrhea and nausea.   Endocrine: Negative for cold intolerance and heat intolerance.   Genitourinary: Negative for dysuria and hematuria.   Musculoskeletal: Negative for arthralgias, back pain and joint swelling.   Skin: Negative for color change and rash.   Allergic/Immunologic: Negative for immunocompromised  "state.   Neurological: Negative for dizziness, syncope and headaches.   Hematological: Negative for adenopathy. Does not bruise/bleed easily.   Psychiatric/Behavioral: Negative for agitation and confusion.              Vitals:    01/15/21 1310   BP: 130/83   Pulse: 88   Resp: 20   Temp: 98 °F (36.7 °C)   TempSrc: Temporal   SpO2: 100%   Weight: 84.1 kg (185 lb 8 oz)   Height: 182.9 cm (72.01\")   PainSc: 0-No pain     Current Status 1/15/2021   ECOG score 0      PHYSICAL EXAM:    GENERAL:  Well-developed, well-nourished in no acute distress.   SKIN:  Warm, dry without rashes, purpura or petechiae.  HEAD:  Normocephalic.  EYES:  Pupils equal, round.  Conjunctivae normal.  EARS:  Hearing intact.  NOSE:  Patient wears mask due to the pandemic coronavirus infection.   NECK:  Supple; no thyromegaly or masses.  LYMPHATICS:  No cervical, supraclavicular adenopathy.  CHEST: Normal respiratory effort.  Lungs clear to auscultation. Good airflow.  CARDIAC:  Regular rate and rhythm without murmurs. Normal S1,S2.  ABDOMEN:  Soft, nontender with no organomegaly or masses.  Bowel sounds normal.  EXTREMITIES:  No clubbing, cyanosis or edema.  NEUROLOGICAL:  Cranial Nerves II-XII grossly intact.   PSYCHIATRIC:  Normal affect and mood.      RECENT LABS:  Lab Results   Component Value Date    WBC 7.00 01/15/2021    HGB 14.8 01/15/2021    HCT 44.9 01/15/2021    MCV 83.9 01/15/2021     01/15/2021     Lab Results   Component Value Date    NEUTROABS 4.40 01/15/2021       Assessment/Plan     ASSESSMENT:  1. Subacute stroke involving the right cerebellum, and also bilateral lower extremity calf vein thrombosis in April 2020.    · His brain MRI on 4/24/2020 also reported chronic small vascular ischemic disease.    · Hypercoagulable work-up in April 2020 was completely negative.    · Patient currently is on Eliquis anticoagulation since the evening of 04/27/2020 after initial Lovenox treatment.   · Venous Doppler study on 7/10/2020 " reported resolution of bilateral lower extremity calf vein thrombosis.    · I discussed with the patient on 7/17/2020 about his laboratory work-up which is completely negative for hypercoagulable status in April 2020 when he suffered a significant stroke in the cerebellum.  Despite had a complete resolution of the calf vein thrombosis, I think his stroke in the cerebellum was significant.  He needs long-term anticoagulation.  I recommended to continue anticoagulation.    · Patient is examined today 1/15/2021, tolerating Eliquis anticoagulation.  This will be continued.    2.  Anemia in April 2020 during his hospitalization.   · Laboratory studies showed appropriate ferritin level 189 and slightly low iron saturation 12% in April 2020.  Normal vitamin B12 level and folate level.   · Patient has normalized hemoglobin 14.7 on 7/17/2020.  · Continue to have normal hemoglobin 14.8 1/15/2021.  Continue to monitor due to risk of bleeding on anticoagulation.      PLAN:  1. Continue Eliquis 5 mg twice a day.  This patient will need a long-term anticoagulation.  This is agreed by neurology service.  I E scribed the new prescription to his pharmacy today.  2. Continue follow-up with neurology service.    3. Continue follow-up with cardiology service.  4. Continue Protonix, and follow-up with GI service.   5. Follow-up with me in 6 months with labs- CBC check.  6. Call the office with any worsening symptoms or CP, SOA.       SALVADOR FAY M.D., Ph.D.    1/15/2021    CC:  RONNA Wilson.    MD Bakari Nelson MD / RONNA Garcia MD

## 2021-01-16 PROBLEM — Z86.718 HISTORY OF DEEP VEIN THROMBOSIS (DVT) OF LOWER EXTREMITY: Status: ACTIVE | Noted: 2021-01-16

## 2021-01-16 PROBLEM — Z79.01 ANTICOAGULATION ADEQUATE: Status: ACTIVE | Noted: 2021-01-16

## 2021-01-22 RX ORDER — PANTOPRAZOLE SODIUM 40 MG/1
40 TABLET, DELAYED RELEASE ORAL
Qty: 90 TABLET | Refills: 1 | Status: SHIPPED | OUTPATIENT
Start: 2021-01-22 | End: 2021-07-19

## 2021-01-26 ENCOUNTER — TELEPHONE (OUTPATIENT)
Dept: FAMILY MEDICINE CLINIC | Facility: CLINIC | Age: 66
End: 2021-01-26

## 2021-01-26 DIAGNOSIS — F42.2 MIXED OBSESSIONAL THOUGHTS AND ACTS: Primary | ICD-10-CM

## 2021-01-26 DIAGNOSIS — F32.A ANXIETY AND DEPRESSION: ICD-10-CM

## 2021-01-26 DIAGNOSIS — F41.9 ANXIETY AND DEPRESSION: ICD-10-CM

## 2021-01-26 NOTE — TELEPHONE ENCOUNTER
Patient is requesting a refill on clomiPRAMINE (ANAFRANIL) 50 MG capsule    Patient advised that Dr. العراقي, his psychriatrist is no longer taking Medicare patients and that why he is asking Yuliya Condon to fill this.     Patient has enough for another month but is wanting to know if this can be known.    [facility]/pharmacy #[id number] - [place], [place] - [address] AT [facility] [phone] [facility] [phone] FX

## 2021-02-04 NOTE — TELEPHONE ENCOUNTER
Spoke to Dr. العراقي and she advise for me to call her office on Monday to request medical records.

## 2021-03-16 ENCOUNTER — BULK ORDERING (OUTPATIENT)
Dept: CASE MANAGEMENT | Facility: OTHER | Age: 66
End: 2021-03-16

## 2021-03-16 DIAGNOSIS — Z23 IMMUNIZATION DUE: ICD-10-CM

## 2021-03-23 ENCOUNTER — OFFICE VISIT (OUTPATIENT)
Dept: NEUROLOGY | Facility: CLINIC | Age: 66
End: 2021-03-23

## 2021-03-23 VITALS
WEIGHT: 185 LBS | BODY MASS INDEX: 25.06 KG/M2 | OXYGEN SATURATION: 99 % | SYSTOLIC BLOOD PRESSURE: 130 MMHG | DIASTOLIC BLOOD PRESSURE: 96 MMHG | HEIGHT: 72 IN | HEART RATE: 83 BPM

## 2021-03-23 DIAGNOSIS — I63.9 CEREBELLAR INFARCT (HCC): Primary | ICD-10-CM

## 2021-03-23 DIAGNOSIS — I63.10 CEREBROVASCULAR ACCIDENT (CVA) DUE TO EMBOLISM OF PRECEREBRAL ARTERY (HCC): ICD-10-CM

## 2021-03-23 PROCEDURE — 99213 OFFICE O/P EST LOW 20 MIN: CPT | Performed by: NURSE PRACTITIONER

## 2021-03-23 NOTE — PROGRESS NOTES
DOS: 3/23/2021  NAME: Leonardo Youssef   : 1955  PCP: Provider, No Known    Chief Complaint   Patient presents with   • Stroke      Referring MD: No ref. provider found    Neurological Problem:  65 y.o. right-handed male with a Hx of DVT, GERD who presents today for stroke follow-up.  He is not accompanied by any family.  History is provided by the patient and review of records as summarized below.    Interval History:  Mr. Youssef was last seen by me in the office for stroke follow-up in 2020.  He had suffered a large cryptogenic acute right cerebellar and small left hemispheric infarcts in 2020.  Etiology of his event was unclear as his vessel imaging was unremarkable, cardiac work-up essentially normal, hypercoagulable panel was also normal.  He was found to have acute DVTs while he was in the hospital therefore after discussion with hematology and cardiology we all recommended that the patient should remain on long-term anticoagulation with Eliquis 5 mg twice daily.  He has had no new stroke/TIA symptoms since his visit with me in July and has been maintained on Eliquis 5 mg twice daily and Lipitor 40 mg daily.  He does continue to have some complaints of lightheadedness with quick movements and becoming off balance especially when he tries to play tennis or run.  He has noticed that he also continues to have some word finding difficulty and decreased attention.  Symptoms have not worsened but are bothersome to him as he was a frequent  prior to the stroke.  He does continue to maintain well-balanced diet and does get physical activity.  His BP has been normalized.  Overall he feels he has recovered quite well.    Review of Systems:        Review of Systems   Eyes: Negative for visual disturbance.   Respiratory: Negative for chest tightness, shortness of breath and wheezing.    Cardiovascular: Negative for chest pain, palpitations and leg swelling.   Musculoskeletal: Positive for gait  "problem. Negative for arthralgias, back pain, joint swelling, myalgias, neck pain and neck stiffness.   Neurological: Negative for dizziness, tremors, seizures, syncope, facial asymmetry, speech difficulty, weakness, light-headedness, numbness and headaches.   Psychiatric/Behavioral: Positive for decreased concentration. Negative for agitation, behavioral problems, confusion, dysphoric mood, hallucinations, self-injury, sleep disturbance and suicidal ideas. The patient is not nervous/anxious and is not hyperactive.        \"The following portions of the patient's history were reviewed and updated as appropriate: allergies, current medications, past family history, past medical history, past social history, past surgical history and problem list.\"    Laboratory Results:             Lab Results   Component Value Date    HGBA1C 5.30 04/25/2020     Lab Results   Component Value Date    CHOL 139 04/25/2020     Lab Results   Component Value Date    HDL 25 (L) 04/25/2020    HDL 42 04/16/2019    HDL 43 12/21/2017     Lab Results   Component Value Date    LDL 81 04/25/2020     (H) 04/16/2019     (H) 12/21/2017     Lab Results   Component Value Date    TRIG 167 (H) 04/25/2020    TRIG 141 04/20/2020    TRIG 80 04/17/2020     No components found for: CHOLHDL  No results found for: RPR  Lab Results   Component Value Date    TSH 3.510 04/28/2020     Lab Results   Component Value Date    WXIGVVTS61 541 04/22/2020     Lab Results   Component Value Date    GLUCOSE 101 01/15/2021    BUN 19 01/15/2021    CREATININE 1.14 01/15/2021    EGFRIFNONA 64 01/15/2021    EGFRIFAFRI 82 04/16/2019    BCR 16.7 01/15/2021    K 5.0 (H) 01/15/2021    CO2 27.6 01/15/2021    CALCIUM 9.9 01/15/2021    PROTENTOTREF 7.4 04/16/2019    ALBUMIN 4.30 01/15/2021    LABIL2 1.6 04/16/2019    AST 30 01/15/2021    ALT 25 01/15/2021     Lab Results   Component Value Date    WBC 7.00 01/15/2021    HGB 14.8 01/15/2021    HCT 44.9 01/15/2021    MCV 83.9 " 01/15/2021     01/15/2021     Lab Results   Component Value Date    INR 1.1 04/28/2020       Physical Examination:   mRS:   General Appearance:   Mildly anxious, well developed, well nourished, well groomed, alert, and cooperative.  HEENT: Normocephalic. Atraumatic.   Cardiac: Regular rate and rhythm.   Extremities:    No obvious edema.  Respiratory:   Even and unlabored.    Neurological examination:  Higher Integrative  Function: Oriented to time, place and person. Normal registration, recall, attention span and concentration. Normal language including comprehension, spontaneous speech, repetition, reading, writing, naming and vocabulary. No neglect with normal visual-spatial function and construction. Normal fund of knowledge and higher integrative function.  CN II: Normal visual acuity and visual fields.    CN III IV VI: Extraocular movements are full without nystagmus.   CN V: Normal facial sensation and strength of muscles of mastication.  CN VII: Facial movements are symmetric. No weakness.  CN VIII:   Auditory acuity is normal.  CN IX & X:   Symmetric palatal movement.  CN XI: Sternocleidomastoid and trapezius are normal.  No weakness.  CN XII:   The tongue is midline.  No atrophy or fasciculations.  Motor: Normal muscle strength, bulk and tone in upper and lower extremities.  No fasciculations, rigidity, spasticity, or abnormal movements.  Sensation: Normal to light touch, vibration, temperature, in arms and legs.   Station and Gait: Normal gait and station.    Coordination: Finger to nose test shows no dysmetria.  Rapid alternating movements are normal.      Impression/Assessment:    Mr. Youssef presents today for stroke follow-up.  He suffered a large cryptogenic acute right cerebellar infarct with additional small left hemispheric infarcts in April 2020 with an unknown etiology.  Ultimately the decision was made to continue him on long-term anticoagulation as he was also found to have acute DVTs  while hospitalized with no evidence of PFO on his echo.  He has been maintained on Eliquis 5 mg twice daily and Lipitor 40 mg with no new stroke/TIA symptoms since our last office visit in July 2020.  He continues to have some mild word finding difficulty and intermittent balance troubles causing him to not be able to play tennis or run as much as he would like.  I did asked that he start back doing the exercises that were provided to him by PT/OT/ST at least 4-5 times a week to help continue with his recovery as he has made a great recovery considering the large size of his stroke.  We did discuss that he may have long-term balance impairment due to the large size of his cerebellar stroke.  We will plan on continuing his Eliquis as he has had no signs or symptoms of bleeding along with his Lipitor.  I have encouraged him to obtain a PCP so that he can have further surveillance of his cholesterol levels through them. We discussed at length his personal risk factors for stroke and importance of risk factor control for stroke prevention, including BP and cholesterol control.  He will monitor BP regularly and follow-up with PCP for continued cholesterol surveillance.  We discussed stroke/TIA symptoms and importance of calling 911 if he were to experience any of these.  Follow-up in 1 year, sooner if symptoms warrant.    Plan:     Continue Eliquis 5 mg twice daily, monitor for signs symptoms of bleeding.  Continue Lipitor 40 mg, repeat lipid panel within the next 3 to 6 months.  Needs to obtain PCP.  Start back exercises provided to him by PT/OT/ST.  Monitor BP regularly  Keep well-hydrated  Encourage regular physical activity  Falls precautions discussed  Maintain well-balanced diet   Blood pressure control to <130/80   Goal LDL <70-recommend high dose statins-    Serum glucose < 140   Call 911 for stroke any stroke symptoms   Follow-up in 1 year, sooner symptoms warrant.  Diagnoses and all orders for this visit:    1.  Cerebellar infarct (CMS/HCC) (Primary)    2. Cerebrovascular accident (CVA) due to embolism of precerebral artery (CMS/HCC)        MDM  Reviewed: previous chart and vitals  Reviewed previous: labs and MRI  Interpretation: labs and MRI        RONNA Garcia

## 2021-04-20 ENCOUNTER — APPOINTMENT (OUTPATIENT)
Dept: GENERAL RADIOLOGY | Facility: HOSPITAL | Age: 66
End: 2021-04-20

## 2021-04-20 PROCEDURE — 74022 RADEX COMPL AQT ABD SERIES: CPT | Performed by: FAMILY MEDICINE

## 2021-04-20 RX ORDER — ATORVASTATIN CALCIUM 40 MG/1
TABLET, FILM COATED ORAL
Qty: 90 TABLET | Refills: 1 | Status: SHIPPED | OUTPATIENT
Start: 2021-04-20 | End: 2021-10-14

## 2021-04-30 ENCOUNTER — OFFICE VISIT (OUTPATIENT)
Dept: FAMILY MEDICINE CLINIC | Facility: CLINIC | Age: 66
End: 2021-04-30

## 2021-04-30 ENCOUNTER — LAB (OUTPATIENT)
Dept: LAB | Facility: HOSPITAL | Age: 66
End: 2021-04-30

## 2021-04-30 VITALS
WEIGHT: 185.6 LBS | OXYGEN SATURATION: 99 % | HEART RATE: 83 BPM | BODY MASS INDEX: 25.14 KG/M2 | DIASTOLIC BLOOD PRESSURE: 78 MMHG | SYSTOLIC BLOOD PRESSURE: 113 MMHG | TEMPERATURE: 96.4 F | HEIGHT: 72 IN

## 2021-04-30 DIAGNOSIS — R53.83 OTHER FATIGUE: ICD-10-CM

## 2021-04-30 DIAGNOSIS — Z00.00 MEDICARE ANNUAL WELLNESS VISIT, SUBSEQUENT: Primary | ICD-10-CM

## 2021-04-30 DIAGNOSIS — Z79.01 ANTICOAGULATION ADEQUATE: ICD-10-CM

## 2021-04-30 DIAGNOSIS — E78.2 MIXED HYPERLIPIDEMIA: ICD-10-CM

## 2021-04-30 LAB
ALBUMIN SERPL-MCNC: 4 G/DL (ref 3.5–5.2)
ALBUMIN/GLOB SERPL: 1.3 G/DL
ALP SERPL-CCNC: 57 U/L (ref 39–117)
ALT SERPL W P-5'-P-CCNC: 35 U/L (ref 1–41)
ANION GAP SERPL CALCULATED.3IONS-SCNC: 8.4 MMOL/L (ref 5–15)
AST SERPL-CCNC: 30 U/L (ref 1–40)
BASOPHILS # BLD AUTO: 0.05 10*3/MM3 (ref 0–0.2)
BASOPHILS NFR BLD AUTO: 1 % (ref 0–1.5)
BILIRUB SERPL-MCNC: 0.6 MG/DL (ref 0–1.2)
BUN SERPL-MCNC: 22 MG/DL (ref 8–23)
BUN/CREAT SERPL: 19 (ref 7–25)
CALCIUM SPEC-SCNC: 9.7 MG/DL (ref 8.6–10.5)
CHLORIDE SERPL-SCNC: 100 MMOL/L (ref 98–107)
CHOLEST SERPL-MCNC: 115 MG/DL (ref 0–200)
CO2 SERPL-SCNC: 28.6 MMOL/L (ref 22–29)
CREAT SERPL-MCNC: 1.16 MG/DL (ref 0.76–1.27)
DEPRECATED RDW RBC AUTO: 45 FL (ref 37–54)
EOSINOPHIL # BLD AUTO: 0.27 10*3/MM3 (ref 0–0.4)
EOSINOPHIL NFR BLD AUTO: 5.4 % (ref 0.3–6.2)
ERYTHROCYTE [DISTWIDTH] IN BLOOD BY AUTOMATED COUNT: 14.7 % (ref 12.3–15.4)
GFR SERPL CREATININE-BSD FRML MDRD: 63 ML/MIN/1.73
GLOBULIN UR ELPH-MCNC: 3.2 GM/DL
GLUCOSE SERPL-MCNC: 86 MG/DL (ref 65–99)
HCT VFR BLD AUTO: 39.6 % (ref 37.5–51)
HDLC SERPL-MCNC: 39 MG/DL (ref 40–60)
HGB BLD-MCNC: 13 G/DL (ref 13–17.7)
IMM GRANULOCYTES # BLD AUTO: 0.01 10*3/MM3 (ref 0–0.05)
IMM GRANULOCYTES NFR BLD AUTO: 0.2 % (ref 0–0.5)
LDLC SERPL CALC-MCNC: 58 MG/DL (ref 0–100)
LDLC/HDLC SERPL: 1.47 {RATIO}
LYMPHOCYTES # BLD AUTO: 1.52 10*3/MM3 (ref 0.7–3.1)
LYMPHOCYTES NFR BLD AUTO: 30.4 % (ref 19.6–45.3)
MCH RBC QN AUTO: 27.5 PG (ref 26.6–33)
MCHC RBC AUTO-ENTMCNC: 32.8 G/DL (ref 31.5–35.7)
MCV RBC AUTO: 83.9 FL (ref 79–97)
MONOCYTES # BLD AUTO: 0.56 10*3/MM3 (ref 0.1–0.9)
MONOCYTES NFR BLD AUTO: 11.2 % (ref 5–12)
NEUTROPHILS NFR BLD AUTO: 2.59 10*3/MM3 (ref 1.7–7)
NEUTROPHILS NFR BLD AUTO: 51.8 % (ref 42.7–76)
NRBC BLD AUTO-RTO: 0 /100 WBC (ref 0–0.2)
PLATELET # BLD AUTO: 241 10*3/MM3 (ref 140–450)
PMV BLD AUTO: 9.9 FL (ref 6–12)
POTASSIUM SERPL-SCNC: 4.6 MMOL/L (ref 3.5–5.2)
PROT SERPL-MCNC: 7.2 G/DL (ref 6–8.5)
RBC # BLD AUTO: 4.72 10*6/MM3 (ref 4.14–5.8)
SODIUM SERPL-SCNC: 137 MMOL/L (ref 136–145)
T4 FREE SERPL-MCNC: 1.01 NG/DL (ref 0.93–1.7)
TRIGL SERPL-MCNC: 94 MG/DL (ref 0–150)
TSH SERPL DL<=0.05 MIU/L-ACNC: 4.6 UIU/ML (ref 0.27–4.2)
VLDLC SERPL-MCNC: 18 MG/DL (ref 5–40)
WBC # BLD AUTO: 5 10*3/MM3 (ref 3.4–10.8)

## 2021-04-30 PROCEDURE — 84439 ASSAY OF FREE THYROXINE: CPT

## 2021-04-30 PROCEDURE — 80053 COMPREHEN METABOLIC PANEL: CPT

## 2021-04-30 PROCEDURE — G0439 PPPS, SUBSEQ VISIT: HCPCS | Performed by: FAMILY MEDICINE

## 2021-04-30 PROCEDURE — 36415 COLL VENOUS BLD VENIPUNCTURE: CPT

## 2021-04-30 PROCEDURE — 80061 LIPID PANEL: CPT

## 2021-04-30 PROCEDURE — 85025 COMPLETE CBC W/AUTO DIFF WBC: CPT

## 2021-04-30 PROCEDURE — 84443 ASSAY THYROID STIM HORMONE: CPT

## 2021-04-30 RX ORDER — MULTIPLE VITAMINS W/ MINERALS TAB 9MG-400MCG
1 TAB ORAL DAILY
COMMUNITY

## 2021-04-30 RX ORDER — CASTOR OIL
OIL (ML) ORAL
COMMUNITY
Start: 2021-04-20 | End: 2021-04-30

## 2021-06-29 ENCOUNTER — OFFICE VISIT (OUTPATIENT)
Dept: CARDIOLOGY | Facility: CLINIC | Age: 66
End: 2021-06-29

## 2021-06-29 VITALS
SYSTOLIC BLOOD PRESSURE: 126 MMHG | BODY MASS INDEX: 24.92 KG/M2 | WEIGHT: 184 LBS | HEART RATE: 83 BPM | DIASTOLIC BLOOD PRESSURE: 80 MMHG | HEIGHT: 72 IN | OXYGEN SATURATION: 99 %

## 2021-06-29 DIAGNOSIS — I71.20 THORACIC AORTIC ANEURYSM WITHOUT RUPTURE (HCC): Primary | ICD-10-CM

## 2021-06-29 PROCEDURE — 99213 OFFICE O/P EST LOW 20 MIN: CPT | Performed by: INTERNAL MEDICINE

## 2021-06-29 PROCEDURE — 93000 ELECTROCARDIOGRAM COMPLETE: CPT | Performed by: INTERNAL MEDICINE

## 2021-06-29 NOTE — PROGRESS NOTES
The patient is a 66-year-old white male with a history of hypertension          Date of Office Visit: 2021    Patient Name: Leonardo Youssef  : 1955    Encounter Provider: Remi John MD  Referring Provider: No ref. provider found  Place of Service: [facility] CARDIOLOGY  Patient Care Team:  Renuka Costello MD as PCP - General (Family Medicine)  Lebron Addison MD as Referring Physician (Hospitalist)  Cezar Streeter MD PhD as Consulting Physician (Hematology and Oncology)      Chief Complaint   Patient presents with   • Cerebellar infarct   • Aortic Aneurysm     History of Present Illness    The patient is a 66-year-old white male with a history of a cerebellar infarct.  At the time of his evaluation a catheterization was performed which did not show significant coronary disease.  The patient was noted to have an enlargement of his aortic root at 4.6 cm.  A CT scan of his chest did not demonstrate any significant enlargement but only borderline.    In the last year the patient has done well without any further neurologic events.  He remains anticoagulated with apixaban as well as on atorvastatin.  He does not have a history of hypertension.  He is asymptomatic.    Past Medical History:   Diagnosis Date   • Anxiety    • Aspiration pneumonia (CMS/HCC)    • Depression    • DVT, bilateral lower limbs (CMS/HCC)    • Dysphagia    • Falls     2 falls due to ( 1) balance  (2) tripped   • Hx of radiation therapy    • Kidney stone    • Lightheadedness    • OCD (obsessive compulsive disorder)    • Prostate cancer (CMS/HCC)     prostate   • Stroke (cerebrum) (CMS/HCC) 2020         Past Surgical History:   Procedure Laterality Date   • CARDIAC CATHETERIZATION N/A 2020    Procedure: Left Heart Cath;  Surgeon: Remi John MD;  Location: [facility] CATH INVASIVE LOCATION;  Service: Cardiovascular;  Laterality: N/A;   • CARDIAC CATHETERIZATION N/A 2020     Procedure: Coronary angiography;  Surgeon: Remi John MD;  Location: [facility] CATH INVASIVE LOCATION;  Service: Cardiovascular;  Laterality: N/A;   • CARDIAC CATHETERIZATION N/A 4/27/2020    Procedure: Left ventriculography;  Surgeon: Remi John MD;  Location: [facility] CATH INVASIVE LOCATION;  Service: Cardiovascular;  Laterality: N/A;   • ENDOSCOPY N/A 4/16/2020    Procedure: ESOPHAGOGASTRODUODENOSCOPY WITH COLD BIOPSIES;  Surgeon: Renetta Oden MD;  Location: [facility] ENDOSCOPY;  Service: Gastroenterology;  Laterality: N/A;  PRE- DYSPHAGIA, ABNORMAL CT  POST- ESOPHAGITIS WITH LARGE ESOPHAGEAL ULCER, HIATAL HERNIA, RETAINED FOOD   • ENDOSCOPY N/A 6/20/2020    Procedure: ESOPHAGOGASTRODUODENOSCOPY with biopsies;  Surgeon: Rosanna Ghosh MD;  Location[facility] END[facility]COPY;  Service: Gastroenterology;  Laterality: N/A;  pre- hx esophageal tear  post- esophadeal ring, hiatal hernia, gastritis, irregular z line, esophageal diverticumum   • UPPER GASTROINTESTINAL ENDOSCOPY  07/2020    esophageal tear           Current Outpatient Medications:   •  apixaban (Eliquis) 5 MG tablet tablet, Take 1 tablet by mouth Every 12 (Twelve) Hours., Disp: 60 tablet, Rfl: 5  •  atorvastatin (LIPITOR) 40 MG tablet, TAKE 1 TABLET BY MOUTH EVERY DAY, Disp: 90 tablet, Rfl: 1  •  clomiPRAMINE (ANAFRANIL) 50 MG capsule, Take 150 mg by mouth Every Night., Disp: , Rfl:   •  multivitamin with minerals (MULTIVITAMIN ADULT PO), Take 1 tablet by mouth Daily., Disp: , Rfl:   •  pantoprazole (PROTONIX) 40 MG EC tablet, Take 1 tablet by mouth Every Morning Before Breakfast., Disp: 90 tablet, Rfl: 1      Social History     Socioeconomic History   • Marital status:      Spouse name: Kelsey   • Number of children: 2   • Years of education: Not on file   • Highest education level: Not on file   Tobacco Use   • Smoking status: Never Smoker   • Smokeless tobacco: Never Used   Vaping Use   • Vaping Use: Never used   Substance and Sexual  "Activity   • Alcohol use: Not Currently     Comment: 1 per week   • Drug use: No   • Sexual activity: Defer         Review of Systems   Constitutional: Negative.   HENT: Negative.    Eyes: Negative.    Cardiovascular: Negative.    Respiratory: Negative.    Endocrine: Negative.    Skin: Negative.    Musculoskeletal: Negative.    Gastrointestinal: Negative.    Neurological: Negative.    Psychiatric/Behavioral: Negative.        Procedures      ECG 12 Lead    Date/Time: 6/29/2021 11:33 AM  Performed by: Remi John MD  Authorized by: Remi John MD   Comparison: compared with previous ECG from 6/25/2020  Rhythm: sinus rhythm  Rate: normal  Conduction: conduction normal  ST Segments: ST segments normal  QRS axis: normal                  Objective:    /80 (BP Location: Left arm, Patient Position: Sitting)   Pulse 83   Ht 182.9 cm (72\")   Wt 83.5 kg (184 lb)   SpO2 99%   BMI 24.95 kg/m²         Vitals reviewed.   Constitutional:       Appearance: Well-developed.   Eyes:      Pupils: Pupils are equal, round, and reactive to light.   HENT:      Head: Normocephalic.   Neck:      Thyroid: No thyromegaly.      Vascular: No carotid bruit or JVD.   Pulmonary:      Effort: Pulmonary effort is normal.      Breath sounds: Normal breath sounds.   Cardiovascular:      Normal rate. Regular rhythm. Normal S1. Normal S2.      Murmurs: There is no murmur.      No gallop.   Pulses:     Intact distal pulses.   Edema:     Peripheral edema absent.   Musculoskeletal:      Cervical back: Normal range of motion. Skin:     General: Skin is warm and dry.      Findings: No erythema.   Neurological:      Mental Status: Alert and oriented to person, place, and time.             Assessment:       Diagnosis Plan   1. Thoracic aortic aneurysm without rupture (CMS/HCC)  CT angiogram chest w contrast     1.  History of thoracic aortic aneurysm: We will get another CT scan of his thoracic aorta.  If there is no change then I " think we can probably delay a few years on a follow-up.  Plan:

## 2021-07-19 RX ORDER — PANTOPRAZOLE SODIUM 40 MG/1
TABLET, DELAYED RELEASE ORAL
Qty: 90 TABLET | Refills: 1 | Status: SHIPPED | OUTPATIENT
Start: 2021-07-19 | End: 2022-01-12

## 2021-07-23 ENCOUNTER — OFFICE VISIT (OUTPATIENT)
Dept: ONCOLOGY | Facility: CLINIC | Age: 66
End: 2021-07-23

## 2021-07-23 ENCOUNTER — APPOINTMENT (OUTPATIENT)
Dept: LAB | Facility: HOSPITAL | Age: 66
End: 2021-07-23

## 2021-07-23 ENCOUNTER — LAB (OUTPATIENT)
Dept: LAB | Facility: HOSPITAL | Age: 66
End: 2021-07-23

## 2021-07-23 VITALS
HEIGHT: 72 IN | BODY MASS INDEX: 24.92 KG/M2 | TEMPERATURE: 97.7 F | WEIGHT: 184 LBS | DIASTOLIC BLOOD PRESSURE: 81 MMHG | SYSTOLIC BLOOD PRESSURE: 134 MMHG | RESPIRATION RATE: 16 BRPM | OXYGEN SATURATION: 100 % | HEART RATE: 85 BPM

## 2021-07-23 DIAGNOSIS — Z86.718 HISTORY OF DEEP VEIN THROMBOSIS (DVT) OF LOWER EXTREMITY: ICD-10-CM

## 2021-07-23 DIAGNOSIS — Z79.01 ANTICOAGULATION ADEQUATE: ICD-10-CM

## 2021-07-23 DIAGNOSIS — I63.9 CEREBELLAR INFARCT (HCC): ICD-10-CM

## 2021-07-23 DIAGNOSIS — I63.10 CEREBROVASCULAR ACCIDENT (CVA) DUE TO EMBOLISM OF PRECEREBRAL ARTERY (HCC): Primary | ICD-10-CM

## 2021-07-23 LAB
BASOPHILS # BLD AUTO: 0.08 10*3/MM3 (ref 0–0.2)
BASOPHILS NFR BLD AUTO: 1.2 % (ref 0–1.5)
DEPRECATED RDW RBC AUTO: 45.2 FL (ref 37–54)
EOSINOPHIL # BLD AUTO: 0.26 10*3/MM3 (ref 0–0.4)
EOSINOPHIL NFR BLD AUTO: 4 % (ref 0.3–6.2)
ERYTHROCYTE [DISTWIDTH] IN BLOOD BY AUTOMATED COUNT: 14.9 % (ref 12.3–15.4)
HCT VFR BLD AUTO: 46 % (ref 37.5–51)
HGB BLD-MCNC: 15.4 G/DL (ref 13–17.7)
IMM GRANULOCYTES # BLD AUTO: 0.04 10*3/MM3 (ref 0–0.05)
IMM GRANULOCYTES NFR BLD AUTO: 0.6 % (ref 0–0.5)
LYMPHOCYTES # BLD AUTO: 1.72 10*3/MM3 (ref 0.7–3.1)
LYMPHOCYTES NFR BLD AUTO: 26.6 % (ref 19.6–45.3)
MCH RBC QN AUTO: 27.8 PG (ref 26.6–33)
MCHC RBC AUTO-ENTMCNC: 33.5 G/DL (ref 31.5–35.7)
MCV RBC AUTO: 83 FL (ref 79–97)
MONOCYTES # BLD AUTO: 0.94 10*3/MM3 (ref 0.1–0.9)
MONOCYTES NFR BLD AUTO: 14.5 % (ref 5–12)
NEUTROPHILS NFR BLD AUTO: 3.43 10*3/MM3 (ref 1.7–7)
NEUTROPHILS NFR BLD AUTO: 53.1 % (ref 42.7–76)
NRBC BLD AUTO-RTO: 0 /100 WBC (ref 0–0.2)
PLATELET # BLD AUTO: 241 10*3/MM3 (ref 140–450)
PMV BLD AUTO: 10.1 FL (ref 6–12)
RBC # BLD AUTO: 5.54 10*6/MM3 (ref 4.14–5.8)
WBC # BLD AUTO: 6.47 10*3/MM3 (ref 3.4–10.8)

## 2021-07-23 PROCEDURE — 99213 OFFICE O/P EST LOW 20 MIN: CPT | Performed by: INTERNAL MEDICINE

## 2021-07-23 PROCEDURE — 36415 COLL VENOUS BLD VENIPUNCTURE: CPT

## 2021-07-23 PROCEDURE — 85025 COMPLETE CBC W/AUTO DIFF WBC: CPT

## 2021-07-23 NOTE — PROGRESS NOTES
.     REASON FOR FOLLOW-UP:     1. Subacute stroke involving the right cerebellum, and also bilateral lower extremity calf vein thrombosis in April 2020.    · Hypercoagulable work-up was completely negative.    · Patient currently is on Eliquis anticoagulation since 04/27/2020.   · Venous Doppler study on 7/10/2020 reported resolution of bilateral lower extremity calf vein thrombosis.    2.  Anemia Hb 8.2 in April 2020 during his hospitalization.    · Laboratory studies showed appropriate ferritin level 189 and slightly low iron saturation 12%.  Normal vitamin B12 level and folate level.  Hemoglobin was improving without transfusion.  · Anemia resolved on 7/17/2020 with Hb 14.7.  3. Esophageal ulcer status post EGD on 04/16/2020, biopsy sample showed no evidence of malignancy.   · The patient was followed by GI service, started on Protonix.  · Repeated EGD examination 6/20/2020 reported esophagitis and gastritis.  No esophageal ulcer.                              HISTORY OF PRESENT ILLNESS: Patient is a 66 y.o. male who presents today for 6-month follow-up.     Patient reports he is fully vaccinated against COVID-19.  He is doing well, tolerating Eliquis anticoagulation, denies easy bleeding or bruising.  Denies melena or hematochezia.  No epistaxis or gum bleeding.  No signs for recurrent stroke or DVT.  Denies chest pain or dyspnea.  Patient has excellent performance status ECOG 0.    Patient takes Eliquis 5 mg twice a day.    Patient reports he continues taking Protonix.  He denies melena hematochezia.    He has no complaints at this time.     Laboratory studies today on 7/23/2021 reported complete normal CBC including Hb 15.4, platelets 241,000 WBC 6470.       Past Medical History:   Diagnosis Date   • Anxiety    • Aspiration pneumonia (CMS/HCC)    • Depression    • DVT, bilateral lower limbs (CMS/HCC)    • Dysphagia    • Falls     2 falls due to ( 1) balance  (2) tripped   • Hx of radiation therapy    •  Kidney stone    • Lightheadedness    • OCD (obsessive compulsive disorder)    • Prostate cancer (CMS/[facility])     prostate   • Stroke (cerebrum) (CMS/[facility]) 05/2020   · Respiratory failure, required ventilation support in April 2020.  · Coronary artery disease/ischemic disease with cardiac catheterization on 4/27/2020.   ·   Past Surgical History:   Procedure Laterality Date   • CARDIAC CATHETERIZATION N/A 4/27/2020    Procedure: Left Heart Cath;  Surgeon: Remi John MD;  Location: [facility] CATH INVASIVE LOCATION;  Service: Cardiovascular;  Laterality: N/A;   • CARDIAC CATHETERIZATION N/A 4/27/2020    Procedure: Coronary angiography;  Surgeon: Remi John MD;  Location: [facility] CATH INVASIVE LOCATION;  Service: Cardiovascular;  Laterality: N/A;   • CARDIAC CATHETERIZATION N/A 4/27/2020    Procedure: Left ventriculography;  Surgeon: Remi John MD;  Location: [facility] CATH INVASIVE LOCATION;  Service: Cardiovascular;  Laterality: N/A;   • ENDOSCOPY N/A 4/16/2020    Procedure: ESOPHAGOGASTRODUODENOSCOPY WITH COLD BIOPSIES;  Surgeon: Renetta Oden MD;  Location: [facility] ENDOSCOPY;  Service: Gastroenterology;  Laterality: N/A;  PRE- DYSPHAGIA, ABNORMAL CT  POST- ESOPHAGITIS WITH LARGE ESOPHAGEAL ULCER, HIATAL HERNIA, RETAINED FOOD   • ENDOSCOPY N/A 6/20/2020    Procedure: ESOPHAGOGASTRODUODENOSCOPY with biopsies;  Surgeon: Rosanna Ghosh MD;  Location: [facility] ENDOSCOPY;  Service: Gastroenterology;  Laterality: N/A;  pre- hx esophageal tear  post- esophadeal ring, hiatal hernia, gastritis, irregular z line, esophageal diverticumum   • UPPER GASTROINTESTINAL ENDOSCOPY  07/2020    esophageal tear     ONCOLOGIC/HEMATOLOGIC HISTORY: (History from previous dates can be found in the separate document.)    The patient is a 65 y.o. year old male who has history of prostate cancer post radiation therapy in [place] 2000, more than 15 years ago, and also history of anxiety/depression, and chronic  dysphagia, who has complicated medical history this time since his admission on 04/16/2020. This patient presented to the emergency room because of dyspnea and he was found having bilateral pneumonia and respiratory failure. He was actually admitted to the Intensive Care Unit and was on ventilation support. He was tested negative for COVID-19 infection. Nevertheless he had CT scan examination on 04/16/2020 which reported circumferential thickening of the distal esophagus, and also bilateral pulmonary infiltration. The patient was seen by GI service and Juan Babcock MD, did EGD examination on 04/16/2020. This study reported ulceration and mucosal breaks involving at least 75% of the esophageal circumference with esophagitis but no recent bleeding. Biopsy was taken. Pathology evaluation reported no evidence of malignancy. There was moderate active inflammation with eosinophil. The patient had gastric tube placed due to his GI problem.     This patient seems also had stroke evident on brain MRI examination, at subacute infarction of the lower right cerebellum. There was also post infarct enhancement and evidence of small petechial hemorrhage.      This patient had bilateral venous duplex study in the lower extremities on 04/26/2020 and this study reported right leg acute venous thrombosis in the peroneal vein and left leg acute deep vein thrombosis involving the peroneal and gastrocnemius veins. All other veins were normal bilaterally.      The patient currently has been on Eliquis anticoagulation which I agree with.      The patient reports he had issue with dysphagia for quite a significant time and sometimes with food stuck. Most time he was able to cough it out or swallow it without further incident, however this time prior to the ER visit he really had food stuck and was kind of choking. He was brought up to the emergency room by ambulance.       I saw him as a consult during his hospitalization in April 2020  after a stroke that required ICU admission from 4/16/2020 to 4/30/2020.    Patient reports he is on Eliquis anticoagulation 5 mg twice a day, denies bleeding or bruising.  He is recovering from his stroke, he still has some imbalance month but improved.  He denies headaches vision changes.  His performance status is ECOG 1-0.    Patient reports he continues taking Protonix.  He denies melena hematochezia.    He has no complaints at this time.     I reviewed the medical records, and found out that he had repeated EGD examination 6/20/2020 which reported evidence of esophagitis and gastritis.  This is confirmed by by pathology evaluation.      Laboratory studies, on 7/17/2020 reported normalized hemoglobin 14.7.  He has normal platelets 219,000 and WBC 6930 including ANC 4070 lymphocytes 1600.     I summarized the results of hypercoagulable work-up for him.  In April 2020 we obtain comprehensive laboratory work-up during his hospitalization.  He had a negative work-up.  He was tested negative for factor II mutation, factor V Leyden mutation, and a normal Antithrombin activity 103%, protein C activity 109% and protein S activity 95%.  He was tested negative for anticardiolipin antibodies, anti-beta-2 5.2  Antibodies and anti-phosphatidylserine antibodies in the negative for lupus anticoagulant.    Lab results in April 2020 reported ferritin 189, iron saturation 12%, free iron 28 TIBC 228 and vitamin B12 at 541 and RBC folate 1025 ng/mL on 4/22/2020.  Had elevated C-reactive protein 4.27 mg/dL.  His hemoglobin was 10.1, platelets 199,000 and WBC 7180 on the same day.  Serum folate was 8.4 ng/mL on 4/29/2020.          MEDICATIONS    Current Outpatient Medications:   •  apixaban (Eliquis) 5 MG tablet tablet, Take 1 tablet by mouth Every 12 (Twelve) Hours., Disp: 60 tablet, Rfl: 5  •  atorvastatin (LIPITOR) 40 MG tablet, TAKE 1 TABLET BY MOUTH EVERY DAY, Disp: 90 tablet, Rfl: 1  •  clomiPRAMINE (ANAFRANIL) 50 MG capsule,  Take 150 mg by mouth Every Night., Disp: , Rfl:   •  multivitamin with minerals (MULTIVITAMIN ADULT PO), Take 1 tablet by mouth Daily., Disp: , Rfl:   •  pantoprazole (PROTONIX) 40 MG EC tablet, TAKE 1 TABLET BY MOUTH EVERY DAY BEFORE BREAKFAST, Disp: 90 tablet, Rfl: 1    ALLERGIES:   No Known Allergies    SOCIAL HISTORY:       Social History     Socioeconomic History   • Marital status:      Spouse name: Kelsey   • Number of children: 2   • Years of education: Not on file   • Highest education level: Not on file   Tobacco Use   • Smoking status: Never Smoker   • Smokeless tobacco: Never Used   Vaping Use   • Vaping Use: Never used   Substance and Sexual Activity   • Alcohol use: Not Currently     Comment: 1 per week   • Drug use: No   • Sexual activity: Defer         FAMILY HISTORY:  Family History   Problem Relation Age of Onset   • Glaucoma Mother    • Alzheimer's disease Mother    • Hypertension Father    • Diabetes Father    • Liver cancer Father    • Nephrolithiasis Father    • Multiple myeloma Sister    • Other Brother         liver transplant   • Multiple myeloma Brother    • Liver cancer Brother    • Prostate cancer Brother    • Arthritis Other    • Diabetes Other    • Cancer Other    • Malig Hyperthermia Neg Hx        REVIEW OF SYSTEMS:  Review of Systems   Constitutional: Negative for activity change, appetite change, diaphoresis, fatigue, fever and unexpected weight change.   HENT: Negative for congestion, hearing loss, nosebleeds and trouble swallowing.    Eyes: Negative for visual disturbance.   Respiratory: Negative for cough and shortness of breath.    Cardiovascular: Negative for chest pain and leg swelling.   Gastrointestinal: Negative for abdominal pain, blood in stool, diarrhea and nausea.   Endocrine: Negative for cold intolerance.   Genitourinary: Negative for dysuria and hematuria.   Musculoskeletal: Negative for arthralgias, back pain and joint swelling.   Skin: Negative for color  "change and pallor.   Allergic/Immunologic: Negative for immunocompromised state.   Neurological: Negative for dizziness, syncope, weakness and headaches.   Hematological: Negative for adenopathy. Does not bruise/bleed easily.   Psychiatric/Behavioral: Negative for agitation and sleep disturbance.              Vitals:    07/23/21 0831   BP: 134/81   Pulse: 85   Resp: 16   Temp: 97.7 °F (36.5 °C)   TempSrc: Skin   SpO2: 100%   Weight: 83.5 kg (184 lb)   Height: 182.9 cm (72.01\")   PainSc: 0-No pain     Current Status 1/15/2021   ECOG score 0      PHYSICAL EXAM:    GENERAL:  Well-developed, well-nourished male in no acute distress.  Orientated to time place and the people.  SKIN:  Warm, dry without rashes, purpura or petechiae.  HEENT:  Normocephalic.  Wearing mask.   LYMPHATICS:  No cervical, supraclavicular adenopathy.  CHEST: Normal respiratory effort.  Lungs clear to auscultation. Good airflow.  CARDIAC:  Regular rate and rhythm without murmurs. Normal S1,S2.  ABDOMEN:  Soft, nontender with no organomegaly or masses.  Bowel sounds normal.  EXTREMITIES:  No clubbing, cyanosis or edema.  NEUROLOGICAL:  Grossly intact.  Stable gait.  PSYCHIATRIC:  Normal affect and mood.        RECENT LABS:  Lab Results   Component Value Date    WBC 6.47 07/23/2021    HGB 15.4 07/23/2021    HCT 46.0 07/23/2021    MCV 83.0 07/23/2021     07/23/2021     Lab Results   Component Value Date    NEUTROABS 3.43 07/23/2021       Assessment/Plan     ASSESSMENT:  1. Subacute stroke involving the right cerebellum, and also bilateral lower extremity calf vein thrombosis in April 2020.    · His brain MRI on 4/24/2020 also reported chronic small vascular ischemic disease.    · Hypercoagulable work-up in April 2020 was completely negative.    · Patient currently is on Eliquis anticoagulation since  04/27/2020 after initial Lovenox treatment.   · Venous Doppler study on 7/10/2020 reported resolution of bilateral lower extremity calf vein " thrombosis.    · I discussed with the patient on 7/17/2020 about his laboratory work-up which is completely negative for hypercoagulable status in April 2020 when he suffered a significant stroke in the cerebellum.  Despite complete resolution of the calf vein thrombosis, I think his stroke in the cerebellum was significant.  He needs long-term anticoagulation.  I recommended to continue anticoagulation.    · Patient is examined 1/15/2021, tolerating Eliquis anticoagulation.  This will be continued.  · On 7/23/2021, patient reports good tolerance with no easy bleeding or bruising.  No new signs of stroke or DVT.  Continue Eliquis.    2.  Anemia in April 2020 during his hospitalization.   · Laboratory studies showed appropriate ferritin level 189 and slightly low iron saturation 12% in April 2020.  Normal vitamin B12 level and folate level.   · Patient has normalized hemoglobin 14.7 on 7/17/2020.  · Continue to have normal hemoglobin 14.8 on 1/15/2021.  Continue to monitor due to risk of bleeding on anticoagulation.  · Outside lab reported hemoglobin 13.0 on 4/30/2021.    · Normal hemoglobin 15.4 on 7/23/2021.  Continue to monitor.      PLAN:  1. Continue Eliquis 5 mg twice a day.  This patient will need long-term anticoagulation.  This is agreed by neurology service.    2. Continue follow-up with cardiology service.  3. Continue Protonix, and follow-up with GI service.   4. Follow-up with me in 6 months with labs- CBC check.  5. Call the office with any worsening symptoms or CP, SOA.       SALVADOR FAY M.D., Ph.D.    7/23/2021    CC:  RONNA Wilson.    MD Bakari Nelson MD / RONNA Garcia MD

## 2021-07-27 ENCOUNTER — HOSPITAL ENCOUNTER (OUTPATIENT)
Dept: CT IMAGING | Facility: HOSPITAL | Age: 66
Discharge: HOME | End: 2021-07-27
Admitting: INTERNAL MEDICINE

## 2021-07-27 DIAGNOSIS — I71.20 THORACIC AORTIC ANEURYSM WITHOUT RUPTURE (HCC): ICD-10-CM

## 2021-07-27 LAB — CREAT BLDA-MCNC: 1.2 MG/DL (ref 0.6–1.3)

## 2021-07-27 PROCEDURE — 0 IOPAMIDOL PER 1 ML: Performed by: INTERNAL MEDICINE

## 2021-07-27 PROCEDURE — 71275 CT ANGIOGRAPHY CHEST: CPT

## 2021-07-27 PROCEDURE — 82565 ASSAY OF CREATININE: CPT

## 2021-07-27 RX ADMIN — IOPAMIDOL 95 ML: 755 INJECTION, SOLUTION INTRAVENOUS at 16:10

## 2021-08-09 RX ORDER — APIXABAN 5 MG/1
TABLET, FILM COATED ORAL
Qty: 60 TABLET | Refills: 5 | Status: SHIPPED | OUTPATIENT
Start: 2021-08-09 | End: 2022-01-14 | Stop reason: SDUPTHER

## 2021-10-14 RX ORDER — ATORVASTATIN CALCIUM 40 MG/1
TABLET, FILM COATED ORAL
Qty: 90 TABLET | Refills: 1 | Status: SHIPPED | OUTPATIENT
Start: 2021-10-14 | End: 2022-04-11

## 2021-10-14 NOTE — TELEPHONE ENCOUNTER
Rx Refill Note  Requested Prescriptions     Pending Prescriptions Disp Refills   • atorvastatin (LIPITOR) 40 MG tablet [Pharmacy Med Name: ATORVASTATIN 40 MG TABLET] 90 tablet 1     Sig: TAKE 1 TABLET BY MOUTH EVERY DAY      Last office visit with prescribing clinician: 04/30/2021      Next office visit with prescribing clinician: 00/29/2021            Ben Vines MA  10/14/21, 11:44 EDT

## 2021-11-08 ENCOUNTER — OFFICE VISIT (OUTPATIENT)
Dept: FAMILY MEDICINE CLINIC | Facility: CLINIC | Age: 66
End: 2021-11-08

## 2021-11-08 VITALS
BODY MASS INDEX: 25.19 KG/M2 | SYSTOLIC BLOOD PRESSURE: 124 MMHG | DIASTOLIC BLOOD PRESSURE: 80 MMHG | OXYGEN SATURATION: 99 % | HEIGHT: 72 IN | HEART RATE: 97 BPM | WEIGHT: 186 LBS | TEMPERATURE: 97.5 F

## 2021-11-08 DIAGNOSIS — E78.2 MIXED HYPERLIPIDEMIA: Primary | ICD-10-CM

## 2021-11-08 DIAGNOSIS — L40.9 PSORIASIS: ICD-10-CM

## 2021-11-08 PROCEDURE — 99213 OFFICE O/P EST LOW 20 MIN: CPT | Performed by: FAMILY MEDICINE

## 2021-11-08 RX ORDER — BETAMETHASONE DIPROPIONATE 0.5 MG/G
1 CREAM TOPICAL 2 TIMES DAILY
Qty: 45 G | Refills: 1 | Status: SHIPPED | OUTPATIENT
Start: 2021-11-08 | End: 2022-01-16

## 2021-11-08 NOTE — PROGRESS NOTES
"Chief Complaint  Hyperlipidemia (F/u)    Subjective          Leonardo Youssef presents to [facility]  History of Present Illness for follow-up on hyperlipidemia.  Patient with history of CVA, bilateral calf DVT, coronary artery disease s/p cardiac cath 1 year ago.  Because of his ischemic a stroke and bilateral DVT last year he will need anticoagulation for long time.  He is here for follow-up on hyperlipidemia.  Denies any problem with medication denies any nausea vomiting or abdominal pain.    Objective   Vital Signs:   /80   Pulse 97   Temp 97.5 °F (36.4 °C)   Ht 182.9 cm (72\")   Wt 84.4 kg (186 lb)   SpO2 99%   BMI 25.23 kg/m²     Physical Exam  Constitutional:       General: He is not in acute distress.     Appearance: Normal appearance. He is well-developed.   HENT:      Head: Normocephalic and atraumatic.      Right Ear: Tympanic membrane normal.      Left Ear: Tympanic membrane normal.      Mouth/Throat:      Mouth: Mucous membranes are moist.   Eyes:      General:         Right eye: No discharge.         Left eye: No discharge.      Extraocular Movements: Extraocular movements intact.      Pupils: Pupils are equal, round, and reactive to light.   Cardiovascular:      Rate and Rhythm: Normal rate and regular rhythm.      Pulses: Normal pulses.      Heart sounds: Normal heart sounds.   Pulmonary:      Effort: Pulmonary effort is normal.      Breath sounds: Normal breath sounds. No wheezing or rales.   Abdominal:      General: Bowel sounds are normal.      Palpations: Abdomen is soft. There is no mass.      Tenderness: There is no abdominal tenderness.   Musculoskeletal:      Cervical back: Normal range of motion and neck supple.      Right lower leg: No edema.      Left lower leg: No edema.   Lymphadenopathy:      Cervical: No cervical adenopathy.   Skin:     Comments: B/l hand   Dryness of dorsum of hand with cracked skin  B/l palm dryness with white deposits "   Neurological:      General: No focal deficit present.      Mental Status: He is alert and oriented to person, place, and time.        Result Review :                 Assessment and Plan    Diagnoses and all orders for this visit:    1. Mixed hyperlipidemia (Primary)    2. Psoriasis    Other orders  -     betamethasone dipropionate 0.05 % cream; Apply 1 application topically to the appropriate area as directed 2 (Two) Times a Day.  Dispense: 45 g; Refill: 1      Leonardo Youssef is a 66-year-old male patient with history of CVA and coronary artery disease came here for follow-up on hyperlipidemia.  Continue 40 mg of Lipitor we will check CMP and lipids.    Psoriasis, bilateral hand  cracked skin, bleeding spots I advised him to use good moisturizer like Cetaphil every time when he is washing the hands and reduce the timings.  I will start him on betamethasone cream.      Follow Up   No follow-ups on file.  Patient was given instructions and counseling regarding his condition or for health maintenance advice. Please see specific information pulled into the AVS if appropriate.       Answers for HPI/ROS submitted by the patient on 11/2/2021  What is the primary reason for your visit?: Neurological Problem

## 2021-12-02 ENCOUNTER — OFFICE VISIT (OUTPATIENT)
Dept: GASTROENTEROLOGY | Facility: CLINIC | Age: 66
End: 2021-12-02

## 2021-12-02 VITALS
SYSTOLIC BLOOD PRESSURE: 128 MMHG | HEIGHT: 72 IN | WEIGHT: 187.8 LBS | DIASTOLIC BLOOD PRESSURE: 82 MMHG | BODY MASS INDEX: 25.44 KG/M2

## 2021-12-02 DIAGNOSIS — S11.21XS TEAR OF ESOPHAGUS, SEQUELA: ICD-10-CM

## 2021-12-02 DIAGNOSIS — R13.19 ESOPHAGEAL DYSPHAGIA: ICD-10-CM

## 2021-12-02 DIAGNOSIS — K20.0 ESOPHAGITIS, EOSINOPHILIC: Primary | Chronic | ICD-10-CM

## 2021-12-02 PROCEDURE — 99214 OFFICE O/P EST MOD 30 MIN: CPT | Performed by: INTERNAL MEDICINE

## 2021-12-02 NOTE — PROGRESS NOTES
Chief Complaint   Patient presents with   • Heartburn       Subjective     HPI    Leonardo Youssef is a 66 y.o. male with a past medical history noted below who presents for follow-up of eosinophilic esophagitis, heartburn symptoms, history of esophageal tear.    EGD on June 20, 2020 showing changes consistent with eosinophilic esophagitis, widely patent Schatzki's ring, and some gastritis.    He is taking pantoprazole.  Doing well.  No heartburn, no dysphagia.  Not avoiding any foods.     Has recovered a lot of mobility following his stroke.  Weight is stable.  No change in bowel habits.         Today's visit was in the office.  Both the patient and I were wearing face masks and proper hand hygiene was performed before and after the physical exam.           Current Outpatient Medications:   •  atorvastatin (LIPITOR) 40 MG tablet, TAKE 1 TABLET BY MOUTH EVERY DAY, Disp: 90 tablet, Rfl: 1  •  betamethasone dipropionate 0.05 % cream, Apply 1 application topically to the appropriate area as directed 2 (Two) Times a Day., Disp: 45 g, Rfl: 1  •  clomiPRAMINE (ANAFRANIL) 50 MG capsule, Take 150 mg by mouth Every Night., Disp: , Rfl:   •  Eliquis 5 MG tablet tablet, TAKE 1 TABLET BY MOUTH EVERY 12 HOURS, Disp: 60 tablet, Rfl: 5  •  multivitamin with minerals (MULTIVITAMIN ADULT PO), Take 1 tablet by mouth Daily., Disp: , Rfl:   •  pantoprazole (PROTONIX) 40 MG EC tablet, TAKE 1 TABLET BY MOUTH EVERY DAY BEFORE BREAKFAST, Disp: 90 tablet, Rfl: 1      Objective     Vitals:    12/02/21 1026   BP: 128/82         12/02/21  1026   Weight: 85.2 kg (187 lb 12.8 oz)     Body mass index is 25.47 kg/m².    Physical Exam  Constitutional:       General: He is not in acute distress.  Pulmonary:      Effort: Pulmonary effort is normal.   Abdominal:      Palpations: Abdomen is soft.      Tenderness: There is no abdominal tenderness.   Neurological:      Mental Status: He is alert and oriented to person, place, and time.   Psychiatric:          Mood and Affect: Mood normal.         Behavior: Behavior normal.         Thought Content: Thought content normal.         Judgment: Judgment normal.             WBC   Date Value Ref Range Status   07/23/2021 6.47 3.40 - 10.80 10*3/mm3 Final   04/16/2019 4.77 3.40 - 10.80 10*3/mm3 Final     RBC   Date Value Ref Range Status   07/23/2021 5.54 4.14 - 5.80 10*6/mm3 Final   04/16/2019 5.60 4.14 - 5.80 10*6/mm3 Final     Hemoglobin   Date Value Ref Range Status   07/23/2021 15.4 13.0 - 17.7 g/dL Final     Hematocrit   Date Value Ref Range Status   07/23/2021 46.0 37.5 - 51.0 % Final     MCV   Date Value Ref Range Status   07/23/2021 83.0 79.0 - 97.0 fL Final     MCH   Date Value Ref Range Status   07/23/2021 27.8 26.6 - 33.0 pg Final     MCHC   Date Value Ref Range Status   07/23/2021 33.5 31.5 - 35.7 g/dL Final     RDW   Date Value Ref Range Status   07/23/2021 14.9 12.3 - 15.4 % Final     RDW-SD   Date Value Ref Range Status   07/23/2021 45.2 37.0 - 54.0 fl Final     MPV   Date Value Ref Range Status   07/23/2021 10.1 6.0 - 12.0 fL Final     Platelets   Date Value Ref Range Status   07/23/2021 241 140 - 450 10*3/mm3 Final     Neutrophil %   Date Value Ref Range Status   07/23/2021 53.1 42.7 - 76.0 % Final     Lymphocyte %   Date Value Ref Range Status   07/23/2021 26.6 19.6 - 45.3 % Final     Monocyte %   Date Value Ref Range Status   07/23/2021 14.5 (H) 5.0 - 12.0 % Final     Eosinophil %   Date Value Ref Range Status   07/23/2021 4.0 0.3 - 6.2 % Final     Basophil %   Date Value Ref Range Status   07/23/2021 1.2 0.0 - 1.5 % Final     Immature Grans %   Date Value Ref Range Status   07/23/2021 0.6 (H) 0.0 - 0.5 % Final     Neutrophils, Absolute   Date Value Ref Range Status   07/23/2021 3.43 1.70 - 7.00 10*3/mm3 Final     Lymphocytes, Absolute   Date Value Ref Range Status   07/23/2021 1.72 0.70 - 3.10 10*3/mm3 Final     Monocytes, Absolute   Date Value Ref Range Status   07/23/2021 0.94 (H) 0.10 - 0.90 10*3/mm3 Final      Eosinophils, Absolute   Date Value Ref Range Status   07/23/2021 0.26 0.00 - 0.40 10*3/mm3 Final     Basophils, Absolute   Date Value Ref Range Status   07/23/2021 0.08 0.00 - 0.20 10*3/mm3 Final     Immature Grans, Absolute   Date Value Ref Range Status   07/23/2021 0.04 0.00 - 0.05 10*3/mm3 Final     nRBC   Date Value Ref Range Status   07/23/2021 0.0 0.0 - 0.2 /100 WBC Final       Lab Results   Component Value Date    GLUCOSE 86 04/30/2021    BUN 22 04/30/2021    CREATININE 1.20 07/27/2021    EGFRIFNONA 63 04/30/2021    EGFRIFAFRI 82 04/16/2019    BCR 19.0 04/30/2021    CO2 28.6 04/30/2021    CALCIUM 9.7 04/30/2021    PROTENTOTREF 7.4 04/16/2019    ALBUMIN 4.00 04/30/2021    LABIL2 1.6 04/16/2019    AST 30 04/30/2021    ALT 35 04/30/2021         No new infiltrates are seen.     IMPRESSION:  1. Dilatation of the ascending thoracic aorta as described. This finding  appears relatively stable since the 04/23/2020 study.  2. Interval clearing of bilateral lower lobe atelectasis and/or  pneumonia and bilateral pleural effusions since the previous study.  3. Stable tiny lung nodules.  4. Repeat CT of the chest without contrast in approximately 1 year  recommended to assess stability of these findings.        Radiation dose reduction techniques were utilized, including automated  exposure control and exposure modulation based on body size.     This report was finalized on 7/29/2021 7:26 AM by Dr. Reji Yuen M.D.         I personally reviewed data as detailed below:     The labs listed above.    The radiology studies listed above.    Office notes from: 11/8/21 pcp    Endoscopy procedures and pathology from: 6/2020 EGD      No notes on file    Assessment/Plan    1. Eosinophilic esophagitis: no symptoms while on ppi    2. Esophageal dysphagia: as above    3. History of esophageal tear    Plan  Continue ppi  Monitor symptoms  Back in 1 year, sooner if needed    Diagnoses and all orders for this visit:    1.  Esophagitis, eosinophilic (Primary)    2. Esophageal dysphagia    3. Tear of esophagus, sequela        I have discussed the above plan with the patient.  They verbalize understanding and are in agreement with the plan.  They have been advised to contact the office for any questions, concerns, or changes related to their health.    Dictated utilizing Dragon dictation

## 2022-01-12 RX ORDER — PANTOPRAZOLE SODIUM 40 MG/1
TABLET, DELAYED RELEASE ORAL
Qty: 90 TABLET | Refills: 1 | Status: SHIPPED | OUTPATIENT
Start: 2022-01-12 | End: 2022-07-13

## 2022-01-14 ENCOUNTER — LAB (OUTPATIENT)
Dept: LAB | Facility: HOSPITAL | Age: 67
End: 2022-01-14

## 2022-01-14 ENCOUNTER — OFFICE VISIT (OUTPATIENT)
Dept: ONCOLOGY | Facility: CLINIC | Age: 67
End: 2022-01-14

## 2022-01-14 VITALS
RESPIRATION RATE: 18 BRPM | WEIGHT: 184.1 LBS | SYSTOLIC BLOOD PRESSURE: 144 MMHG | DIASTOLIC BLOOD PRESSURE: 87 MMHG | HEIGHT: 72 IN | HEART RATE: 80 BPM | TEMPERATURE: 97.1 F | OXYGEN SATURATION: 99 % | BODY MASS INDEX: 24.94 KG/M2

## 2022-01-14 DIAGNOSIS — Z79.01 ANTICOAGULATION ADEQUATE: ICD-10-CM

## 2022-01-14 DIAGNOSIS — Z86.718 HISTORY OF DEEP VEIN THROMBOSIS (DVT) OF LOWER EXTREMITY: ICD-10-CM

## 2022-01-14 DIAGNOSIS — Z79.01 ANTICOAGULATION ADEQUATE: Primary | ICD-10-CM

## 2022-01-14 DIAGNOSIS — I63.10 CEREBROVASCULAR ACCIDENT (CVA) DUE TO EMBOLISM OF PRECEREBRAL ARTERY: ICD-10-CM

## 2022-01-14 LAB
BASOPHILS # BLD AUTO: 0.06 10*3/MM3 (ref 0–0.2)
BASOPHILS NFR BLD AUTO: 0.9 % (ref 0–1.5)
DEPRECATED RDW RBC AUTO: 45.2 FL (ref 37–54)
EOSINOPHIL # BLD AUTO: 0.33 10*3/MM3 (ref 0–0.4)
EOSINOPHIL NFR BLD AUTO: 4.7 % (ref 0.3–6.2)
ERYTHROCYTE [DISTWIDTH] IN BLOOD BY AUTOMATED COUNT: 14.3 % (ref 12.3–15.4)
HCT VFR BLD AUTO: 44.5 % (ref 37.5–51)
HGB BLD-MCNC: 14.5 G/DL (ref 13–17.7)
IMM GRANULOCYTES # BLD AUTO: 0.02 10*3/MM3 (ref 0–0.05)
IMM GRANULOCYTES NFR BLD AUTO: 0.3 % (ref 0–0.5)
LYMPHOCYTES # BLD AUTO: 1.15 10*3/MM3 (ref 0.7–3.1)
LYMPHOCYTES NFR BLD AUTO: 16.4 % (ref 19.6–45.3)
MCH RBC QN AUTO: 28.4 PG (ref 26.6–33)
MCHC RBC AUTO-ENTMCNC: 32.6 G/DL (ref 31.5–35.7)
MCV RBC AUTO: 87.3 FL (ref 79–97)
MONOCYTES # BLD AUTO: 0.61 10*3/MM3 (ref 0.1–0.9)
MONOCYTES NFR BLD AUTO: 8.7 % (ref 5–12)
NEUTROPHILS NFR BLD AUTO: 4.85 10*3/MM3 (ref 1.7–7)
NEUTROPHILS NFR BLD AUTO: 69 % (ref 42.7–76)
NRBC BLD AUTO-RTO: 0 /100 WBC (ref 0–0.2)
PLATELET # BLD AUTO: 219 10*3/MM3 (ref 140–450)
PMV BLD AUTO: 9.1 FL (ref 6–12)
RBC # BLD AUTO: 5.1 10*6/MM3 (ref 4.14–5.8)
WBC NRBC COR # BLD: 7.02 10*3/MM3 (ref 3.4–10.8)

## 2022-01-14 PROCEDURE — 36415 COLL VENOUS BLD VENIPUNCTURE: CPT

## 2022-01-14 PROCEDURE — 99213 OFFICE O/P EST LOW 20 MIN: CPT | Performed by: INTERNAL MEDICINE

## 2022-01-14 PROCEDURE — 85025 COMPLETE CBC W/AUTO DIFF WBC: CPT

## 2022-01-14 NOTE — PROGRESS NOTES
.     REASON FOR FOLLOW-UP:     1. Subacute stroke involving the right cerebellum, and also bilateral lower extremity calf vein thrombosis in April 2020.    · Hypercoagulable work-up was completely negative.    · Patient currently is on Eliquis anticoagulation since 04/27/2020.   · Venous Doppler study on 7/10/2020 reported resolution of bilateral lower extremity calf vein thrombosis.    2.  Anemia Hb 8.2 in April 2020 during his hospitalization.    · Laboratory studies showed appropriate ferritin level 189 and slightly low iron saturation 12%.  Normal vitamin B12 level and folate level.  Hemoglobin was improving without transfusion.  · Anemia resolved on 7/17/2020 with Hb 14.7.                                HISTORY OF PRESENT ILLNESS: Patient is a 66 y.o. male who presents today for 6-month follow-up.     Patient reports he is at baseline condition.  He is tolerating Eliquis anticoagulation.  No evidence for recurrent stroke.  No leg edema or pains.  No chest pain or dyspnea.  He also denies easy bleeding or bruising.  Denies melena or hematochezia.  No epistaxis or gum bleeding.     Patient has excellent performance status ECOG 0.    Patient reports he continues taking Protonix.  He denies melena hematochezia.    He has no complaints at this time.     Laboratory study today on 1/14/2022 reported normal CBC.      Past Medical History:   Diagnosis Date   • Anxiety    • Aspiration pneumonia (HCC)    • Benign prostatic hyperplasia    • Depression    • DVT, bilateral lower limbs (Former[facility])    • Dysphagia    • Falls     2 falls due to ( 1) balance  (2) tripped   • GERD (gastroesophageal reflux disease) April 2020    Emergency Room Diagnosis   • HL (hearing loss)    • Hx of radiation therapy    • Kidney stone    • Lightheadedness    • OCD (obsessive compulsive disorder)    • Prostate cancer (HCC)     prostate   • Stroke (cerebrum) (Formerly [facility]) 05/2020   • Ulcer April 2020    Emergency Room Diagnosis   · Respiratory failure,  required ventilation support in April 2020.  · Coronary artery disease/ischemic disease with cardiac catheterization on 4/27/2020.   ·   Past Surgical History:   Procedure Laterality Date   • CARDIAC CATHETERIZATION N/A 4/27/2020    Procedure: Left Heart Cath;  Surgeon: Remi John MD;  Location: [facility] CATH INVASIVE LOCATION;  Service: Cardiovascular;  Laterality: N/A;   • CARDIAC CATHETERIZATION N/A 4/27/2020    Procedure: Coronary angiography;  Surgeon: Remi John MD;  Location: [facility] CATH INVASIVE LOCATION;  Service: Cardiovascular;  Laterality: N/A;   • CARDIAC CATHETERIZATION N/A 4/27/2020    Procedure: Left ventriculography;  Surgeon: Remi John MD;  Location: [facility] CATH INVASIVE LOCATION;  Service: Cardiovascular;  Laterality: N/A;   • ENDOSCOPY N/A 4/16/2020    Procedure: ESOPHAGOGASTRODUODENOSCOPY WITH COLD BIOPSIES;  Surgeon: Renetta Oden MD;  Location: [facility] ENDOSCOPY;  Service: Gastroenterology;  Laterality: N/A;  PRE- DYSPHAGIA, ABNORMAL CT  POST- ESOPHAGITIS WITH LARGE ESOPHAGEAL ULCER, HIATAL HERNIA, RETAINED FOOD   • ENDOSCOPY N/A 6/20/2020    Procedure: ESOPHAGOGASTRODUODENOSCOPY with biopsies;  Surgeon: Rosanna Ghosh MD;  Location: [facility] ENDOSCOPY;  Service: Gastroenterology;  Laterality: N/A;  pre- hx esophageal tear  post- esophadeal ring, hiatal hernia, gastritis, irregular z line, esophageal diverticumum   • UPPER GASTROINTESTINAL ENDOSCOPY  07/2020    esophageal tear     ONCOLOGIC/HEMATOLOGIC HISTORY: (History from previous dates can be found in the separate document.)    The patient is a 65 y.o. year old male who has history of prostate cancer post radiation therapy in [place] 2000, more than 15 years ago, and also history of anxiety/depression, and chronic dysphagia, who has complicated medical history this time since his admission on 04/16/2020. This patient presented to the emergency room because of dyspnea and he was found having bilateral  pneumonia and respiratory failure. He was actually admitted to the Intensive Care Unit and was on ventilation support. He was tested negative for COVID-19 infection. Nevertheless he had CT scan examination on 04/16/2020 which reported circumferential thickening of the distal esophagus, and also bilateral pulmonary infiltration. The patient was seen by GI service and Juan Babcock MD, did EGD examination on 04/16/2020. This study reported ulceration and mucosal breaks involving at least 75% of the esophageal circumference with esophagitis but no recent bleeding. Biopsy was taken. Pathology evaluation reported no evidence of malignancy. There was moderate active inflammation with eosinophil. The patient had gastric tube placed due to his GI problem.     This patient seems also had stroke evident on brain MRI examination, at subacute infarction of the lower right cerebellum. There was also post infarct enhancement and evidence of small petechial hemorrhage.      This patient had bilateral venous duplex study in the lower extremities on 04/26/2020 and this study reported right leg acute venous thrombosis in the peroneal vein and left leg acute deep vein thrombosis involving the peroneal and gastrocnemius veins. All other veins were normal bilaterally.      The patient currently has been on Eliquis anticoagulation which I agree with.      The patient reports he had issue with dysphagia for quite a significant time and sometimes with food stuck. Most time he was able to cough it out or swallow it without further incident, however this time prior to the ER visit he really had food stuck and was kind of choking. He was brought up to the emergency room by ambulance.       I saw him as a consult during his hospitalization in April 2020 after a stroke that required ICU admission from 4/16/2020 to 4/30/2020.    Patient reports he is on Eliquis anticoagulation 5 mg twice a day, denies bleeding or bruising.  He is recovering  from his stroke, he still has some imbalance month but improved.  He denies headaches vision changes.  His performance status is ECOG 1-0.    Patient reports he continues taking Protonix.  He denies melena hematochezia.    He has no complaints at this time.     I reviewed the medical records, and found out that he had repeated EGD examination 6/20/2020 which reported evidence of esophagitis and gastritis.  This is confirmed by by pathology evaluation.      Laboratory studies, on 7/17/2020 reported normalized hemoglobin 14.7.  He has normal platelets 219,000 and WBC 6930 including ANC 4070 lymphocytes 1600.     I summarized the results of hypercoagulable work-up for him.  In April 2020 we obtain comprehensive laboratory work-up during his hospitalization.  He had a negative work-up.  He was tested negative for factor II mutation, factor V Leyden mutation, and a normal Antithrombin activity 103%, protein C activity 109% and protein S activity 95%.  He was tested negative for anticardiolipin antibodies, anti-beta-2 5.2  Antibodies and anti-phosphatidylserine antibodies in the negative for lupus anticoagulant.    Lab results in April 2020 reported ferritin 189, iron saturation 12%, free iron 28 TIBC 228 and vitamin B12 at 541 and RBC folate 1025 ng/mL on 4/22/2020.  Had elevated C-reactive protein 4.27 mg/dL.  His hemoglobin was 10.1, platelets 199,000 and WBC 7180 on the same day.  Serum folate was 8.4 ng/mL on 4/29/2020.      Laboratory studies on 7/23/2021 reported complete normal CBC including Hb 15.4, platelets 241,000 WBC 6470.     MEDICATIONS    Current Outpatient Medications:   •  atorvastatin (LIPITOR) 40 MG tablet, TAKE 1 TABLET BY MOUTH EVERY DAY, Disp: 90 tablet, Rfl: 1  •  clomiPRAMINE (ANAFRANIL) 50 MG capsule, Take 150 mg by mouth Every Night., Disp: , Rfl:   •  Eliquis 5 MG tablet tablet, TAKE 1 TABLET BY MOUTH EVERY 12 HOURS, Disp: 60 tablet, Rfl: 5  •  multivitamin with minerals (MULTIVITAMIN ADULT  "PO), Take 1 tablet by mouth Daily., Disp: , Rfl:   •  pantoprazole (PROTONIX) 40 MG EC tablet, TAKE 1 TABLET BY MOUTH EVERY DAY BEFORE BREAKFAST, Disp: 90 tablet, Rfl: 1  •  betamethasone dipropionate 0.05 % cream, Apply 1 application topically to the appropriate area as directed 2 (Two) Times a Day., Disp: 45 g, Rfl: 1    ALLERGIES:   No Known Allergies    SOCIAL HISTORY:       Social History     Socioeconomic History   • Marital status:      Spouse name: Kelsey   • Number of children: 2   Tobacco Use   • Smoking status: Never Smoker   • Smokeless tobacco: Never Used   Vaping Use   • Vaping Use: Never used   Substance and Sexual Activity   • Alcohol use: Not Currently     Alcohol/week: 1.0 standard drink     Types: 1 Cans of beer per week     Comment: Per week   • Drug use: No   • Sexual activity: Not Currently     Partners: Female     Birth control/protection: Post-menopausal         FAMILY HISTORY:  Family History   Problem Relation Age of Onset   • Glaucoma Mother    • Alzheimer's disease Mother    • Hypertension Father    • Diabetes Father    • Liver cancer Father    • Nephrolithiasis Father    • Multiple myeloma Sister    • Other Brother         liver transplant   • Multiple myeloma Brother    • Liver cancer Brother    • Prostate cancer Brother    • Arthritis Other    • Diabetes Other    • Cancer Other    • Malig Hyperthermia Neg Hx               Vitals:    01/14/22 1114   BP: 144/87   Pulse: 80   Resp: 18   Temp: 97.1 °F (36.2 °C)   TempSrc: Temporal   SpO2: 99%   Weight: 83.5 kg (184 lb 1.6 oz)   Height: 182.9 cm (72.01\")   PainSc: 0-No pain     Current Status 1/14/2022   ECOG score 0      PHYSICAL EXAM:    GENERAL:  Well-developed, well-nourished in no acute distress.    SKIN:  Warm, dry without rashes, purpura or petechiae.  HEENT:  Normocephalic.  Wearing mask.   LYMPHATICS:  No cervical, supraclavicular adenopathy.  CHEST: Normal respiratory effort.  Lungs clear to auscultation. Good " airflow.  CARDIAC:  Regular rate and rhythm without murmurs. Normal S1,S2.  ABDOMEN:  Soft, nontender with no organomegaly or masses.  Bowel sounds normal.  EXTREMITIES:  No clubbing, cyanosis or edema.  NEUROLOGICAL:  Grossly intact.  No focal neurological deficits.  PSYCHIATRIC:  Normal affect and mood.      RECENT LABS:  Lab Results   Component Value Date    WBC 7.02 01/14/2022    HGB 14.5 01/14/2022    HCT 44.5 01/14/2022    MCV 87.3 01/14/2022     01/14/2022     Lab Results   Component Value Date    NEUTROABS 4.85 01/14/2022       Assessment/Plan     ASSESSMENT:  1. Subacute stroke involving the right cerebellum, and also bilateral lower extremity calf vein thrombosis in April 2020.    · His brain MRI on 4/24/2020 also reported chronic small vascular ischemic disease.    · Hypercoagulable work-up in April 2020 was completely negative.    · Patient currently is on Eliquis anticoagulation since  04/27/2020 after initial Lovenox treatment.   · Venous Doppler study on 7/10/2020 reported resolution of bilateral lower extremity calf vein thrombosis.    · I discussed with the patient on 7/17/2020 about his laboratory work-up which is completely negative for hypercoagulable status in April 2020 when he suffered a significant stroke in the cerebellum.  Despite complete resolution of the calf vein thrombosis, I think his stroke in the cerebellum was significant.  He needs long-term anticoagulation.  I recommended to continue anticoagulation.    · Patient is examined 1/15/2021, tolerating Eliquis anticoagulation.  This will be continued.  · On 7/23/2021, patient reports good tolerance with no easy bleeding or bruising.  No new signs of stroke or DVT.  Continue Eliquis.  · On 1/14/2022, patient reports doing well, no recurrence of stroke, no evidence for DVT.  Tolerating Eliquis anticoagulation 5 mg twice a day.  This will be continued.    2.  Anemia in April 2020 during his hospitalization.   · Laboratory studies  showed appropriate ferritin level 189 and slightly low iron saturation 12% in April 2020.  Normal vitamin B12 level and folate level.   · Patient has normalized hemoglobin 14.7 on 7/17/2020.  · Continue to have normal hemoglobin 14.8 on 1/15/2021.  Continue to monitor due to risk of bleeding on anticoagulation.  · Outside lab reported hemoglobin 13.0 on 4/30/2021.    · Normal hemoglobin 15.4 on 7/23/2021.  Continue to monitor.  · On 1/14/2022 hemoglobin 14.5 MCV 87.3.    3. Esophageal ulcer status post EGD on 04/16/2020, biopsy sample showed no evidence of malignancy.   · The patient was followed by GI service, started on Protonix.  · Repeated EGD examination 6/20/2020 reported esophagitis and gastritis.  No esophageal ulcer.    4.  COVID-19 vaccination.    · Patient reports in July 2021 that he is fully vaccinated against COVID-19.      PLAN:  1. Continue Eliquis 5 mg twice a day.  I E scribed it new prescription to his pharmacy.  This patient will need long-term anticoagulation.  This is agreed by neurology service.    2. Continue follow-up with cardiology service.  3. Continue Protonix, and follow-up with GI service.   4. Follow-up with me in 6 months with labs- CBC check.  5. Call the office with any worsening symptoms or CP, SOA.       SALVADOR FAY M.D., Ph.D.    1/14/2022.     CC:  MD Yuliya Alegria APRN.    MD Bakari Nelson MD / RONNA Garcia MD

## 2022-02-08 RX ORDER — APIXABAN 5 MG/1
TABLET, FILM COATED ORAL
Qty: 60 TABLET | Refills: 5 | Status: SHIPPED | OUTPATIENT
Start: 2022-02-08 | End: 2022-07-25 | Stop reason: SDUPTHER

## 2022-03-22 ENCOUNTER — TELEPHONE (OUTPATIENT)
Dept: NEUROLOGY | Facility: CLINIC | Age: 67
End: 2022-03-22

## 2022-03-22 NOTE — TELEPHONE ENCOUNTER
LMTC, we need to reschedule his appt for today with Jade Cherry, she is out of the office today.  We can reschedule his appt for Thurs or Friday this week.

## 2022-03-24 ENCOUNTER — OFFICE VISIT (OUTPATIENT)
Dept: NEUROLOGY | Facility: CLINIC | Age: 67
End: 2022-03-24

## 2022-03-24 VITALS
BODY MASS INDEX: 25.58 KG/M2 | DIASTOLIC BLOOD PRESSURE: 80 MMHG | HEIGHT: 72 IN | OXYGEN SATURATION: 98 % | HEART RATE: 81 BPM | WEIGHT: 188.9 LBS | SYSTOLIC BLOOD PRESSURE: 118 MMHG

## 2022-03-24 DIAGNOSIS — Z86.718 HISTORY OF DEEP VEIN THROMBOSIS (DVT) OF LOWER EXTREMITY: ICD-10-CM

## 2022-03-24 DIAGNOSIS — I69.30 HISTORY OF CVA WITH RESIDUAL DEFICIT: Primary | ICD-10-CM

## 2022-03-24 DIAGNOSIS — R42 DIZZINESS ON STANDING: ICD-10-CM

## 2022-03-24 PROCEDURE — 99213 OFFICE O/P EST LOW 20 MIN: CPT | Performed by: NURSE PRACTITIONER

## 2022-03-24 RX ORDER — KETOCONAZOLE 20 MG/G
CREAM TOPICAL
COMMUNITY
Start: 2022-02-22 | End: 2022-05-09

## 2022-03-24 NOTE — PROGRESS NOTES
DOS: 3/24/2022  NAME: Leonardo Youssef   : 1955  PCP: Renuka Costello MD    Chief Complaint   Patient presents with   • Stroke      Referring MD: No ref. provider found    Neurological Problem:  66 y.o. right-handed male with a Hx of anemia, anxiety and depression who presents today for stroke follow-up.  He is not accompanied by any family.  History is provided by the patient and review of records as summarized below.    Interval History:  Mr. Youssef presents today for stroke follow-up.  I last saw him in the office on 3/23/2021 for stroke follow-up as well.  He suffered a large cryptogenic acute right cerebellar infarct with additional small left hemispheric infarcts in 2020 with an unknown etiology.  Ultimately the decision was made to continue him on long-term anticoagulation as he was also found to have acute DVTs while hospitalized with no evidence of PFO on his echo.  He underwent a full hypercoagulable panel work-up as well that was negative.  He has been maintained on Eliquis 5 mg twice daily as well as Lipitor 40 mg with no new stroke/TIA symptoms since our last office visit in 2021.  He reports he feels he has made a full recovery.  He is still physically active almost daily.  His blood pressure has been normal.  Today it was 118/80.  He reports he has no trouble with sleep.  He denies any worsening of his existing Jennifer or depression.  He reports his cholesterol levels have been well controlled.  He did have an episode of where he was lying flat working underneath a car and when he stood up he became lightheaded and dizzy.  This is not associated with any focal neurological deficits.  It did pass.  He tries to eat a well-balanced diet.  He denies any bleeding issues while being on Eliquis.  He is compliant with his twice daily dosing.  Overall he feels very well.    Review of Systems:        Review of Systems   HENT: Negative for ear pain, facial swelling, hearing loss, nosebleeds, tinnitus  "and trouble swallowing.    Eyes: Negative for photophobia, pain and visual disturbance.   Respiratory: Negative for choking.    Cardiovascular: Negative for chest pain, palpitations and leg swelling.   Neurological: Positive for light-headedness. Negative for dizziness, tremors, facial asymmetry, speech difficulty, weakness and headaches.   Psychiatric/Behavioral: Negative for agitation, confusion, decreased concentration and sleep disturbance. The patient is not nervous/anxious.        \"The following portions of the patient's history were reviewed and updated as appropriate: allergies, current medications, past family history, past medical history, past social history, past surgical history and problem list.\"    Review and Interpretation of Imaging as described in interval history.    Laboratory Results:             Lab Results   Component Value Date    HGBA1C 5.30 04/25/2020     Lab Results   Component Value Date    CHOL 115 04/30/2021    CHOL 139 04/25/2020     Lab Results   Component Value Date    HDL 39 (L) 04/30/2021    HDL 25 (L) 04/25/2020    HDL 42 04/16/2019     Lab Results   Component Value Date    LDL 58 04/30/2021    LDL 81 04/25/2020     (H) 04/16/2019     Lab Results   Component Value Date    TRIG 94 04/30/2021    TRIG 167 (H) 04/25/2020    TRIG 141 04/20/2020     No components found for: CHOLHDL  No results found for: RPR  Lab Results   Component Value Date    TSH 4.600 (H) 04/30/2021     Lab Results   Component Value Date    FGOUORJZ51 541 04/22/2020     Lab Results   Component Value Date    GLUCOSE 86 04/30/2021    BUN 22 04/30/2021    CREATININE 1.20 07/27/2021    EGFRIFNONA 63 04/30/2021    EGFRIFAFRI 82 04/16/2019    BCR 19.0 04/30/2021    K 4.6 04/30/2021    CO2 28.6 04/30/2021    CALCIUM 9.7 04/30/2021    PROTENTOTREF 7.4 04/16/2019    ALBUMIN 4.00 04/30/2021    LABIL2 1.6 04/16/2019    AST 30 04/30/2021    ALT 35 04/30/2021     Lab Results   Component Value Date    WBC 7.02 01/14/2022 "    HGB 14.5 01/14/2022    HCT 44.5 01/14/2022    MCV 87.3 01/14/2022     01/14/2022     Lab Results   Component Value Date    INR 1.1 04/28/2020       Physical Examination:   mRS:   General Appearance:   Elderly male, pleasant, well developed, well nourished, well groomed, alert, and cooperative.  HEENT: Normocephalic. Atraumatic.   Extremities:    No obvious edema.  Respiratory:   Even and unlabored.    Neurological examination:  Higher Integrative  Function: Oriented to time (month, year, date), place and person.  Able to correctly identify wall clock time.  Normal registration, recall, attention span and concentration. Normal language including comprehension, spontaneous speech, reading, writing, naming and vocabulary. Normal fund of knowledge and higher integrative function.  CN II: Normal visual acuity and visual fields.    CN III IV VI: Extraocular movements are full without nystagmus.   CN V: Normal facial sensation and strength of muscles of mastication.  CN VII: Facial movements are symmetric. No weakness.  CN VIII:   Auditory acuity is normal.  CN IX & X:   Symmetric palatal movement.  CN XI: Sternocleidomastoid and trapezius are normal.  No weakness.  CN XII:   The tongue is midline.  No atrophy or fasciculations.  Motor: Normal muscle strength, bulk and tone in upper and lower extremities.  No fasciculations, rigidity, spasticity, or abnormal movements.  Sensation: Normal to light touch, vibration, temperature, in arms and legs.   Station and Gait: Normal gait and station.    Coordination: Finger to nose test shows no dysmetria.  Rapid alternating movements are normal.      Impression/Assessment:    Mr. Youssef suffered a large acute cryptogenic right cerebellar infarct with additional small acute left hemispheric infarct in April 2020 with an unknown etiology.  He has been maintained on anticoagulation with Eliquis 5 mg twice daily as well as Lipitor 40 mg daily for secondary stroke prevention  with stroke/TIA symptoms at the last office visit in March 2021.  He reports he is compliant daily with his Eliquis and has not experienced any signs or symptoms of bleeding.  He had one incident of lightheadedness which sounded orthostatic as he was lying and then went to a standing position and became lightheaded and dizzy.  This was not accompanied with any focal neurological deficits.  It did pass.  I encouraged him to maintain hydration and to be careful with changing positions quickly.  If he were to have recurrence of symptoms I have asked that he reach out to his primary or cardiologist.  His BP was normal today. We discussed at length his personal risk factors for stroke and importance of risk factor control for stroke prevention, including BP and cholesterol control.  He will monitor BP regularly and follow-up with PCP for continued cholesterol surveillance.  We discussed stroke/TIA symptoms and importance of calling 911 if he were to experience any of these.  He can follow-up with me as needed.    Plan:     Continue Eliquis 5 mg twice daily, monitor for signs symptoms of bleeding  Continue Lipitor 40 mg daily, repeat lipid panel with neck scheduled lab draw with PCP  Report any persistent lightheadedness or dizziness to cardiologist or PCP  Monitor BP regularly  Keep well-hydrated  Encourage regular physical activity as tolerated  Maintain well-balanced diet   Blood pressure control to <130/80   Goal LDL <70-recommend high dose statins-    Serum glucose < 140   Call 911 for stroke any stroke symptoms   Follow-up as needed.  Diagnoses and all orders for this visit:    1. History of CVA with residual deficit (Primary)    2. Dizziness on standing    3. History of deep vein thrombosis (DVT) of lower extremity        MDM  Reviewed: previous chart and vitals  Reviewed previous: labs  Interpretation: labs        RONNA Garcia

## 2022-04-11 RX ORDER — ATORVASTATIN CALCIUM 40 MG/1
TABLET, FILM COATED ORAL
Qty: 90 TABLET | Refills: 1 | Status: SHIPPED | OUTPATIENT
Start: 2022-04-11 | End: 2022-10-10

## 2022-04-11 NOTE — TELEPHONE ENCOUNTER
Rx Refill Note  Requested Prescriptions     Pending Prescriptions Disp Refills   • atorvastatin (LIPITOR) 40 MG tablet [Pharmacy Med Name: ATORVASTATIN 40 MG TABLET] 90 tablet 1     Sig: TAKE 1 TABLET BY MOUTH EVERY DAY      Last office visit with prescribing clinician: 11/8/2021      Next office visit with prescribing clinician: 5/9/2022            Danielle Patel MA  04/11/22, 10:22 EDT

## 2022-05-02 DIAGNOSIS — R53.83 OTHER FATIGUE: ICD-10-CM

## 2022-05-02 DIAGNOSIS — E78.2 MIXED HYPERLIPIDEMIA: Primary | ICD-10-CM

## 2022-05-03 LAB
ALBUMIN SERPL-MCNC: 4.3 G/DL (ref 3.8–4.8)
ALBUMIN/GLOB SERPL: 1.5 {RATIO} (ref 1.2–2.2)
ALP SERPL-CCNC: 71 IU/L (ref 44–121)
ALT SERPL-CCNC: 29 IU/L (ref 0–44)
AST SERPL-CCNC: 31 IU/L (ref 0–40)
BASOPHILS # BLD AUTO: 0.1 X10E3/UL (ref 0–0.2)
BASOPHILS NFR BLD AUTO: 1 %
BILIRUB SERPL-MCNC: 0.5 MG/DL (ref 0–1.2)
BUN SERPL-MCNC: 16 MG/DL (ref 8–27)
BUN/CREAT SERPL: 16 (ref 10–24)
CALCIUM SERPL-MCNC: 9.4 MG/DL (ref 8.6–10.2)
CHLORIDE SERPL-SCNC: 100 MMOL/L (ref 96–106)
CHOLEST SERPL-MCNC: 127 MG/DL (ref 100–199)
CO2 SERPL-SCNC: 24 MMOL/L (ref 20–29)
CREAT SERPL-MCNC: 1.01 MG/DL (ref 0.76–1.27)
EGFRCR SERPLBLD CKD-EPI 2021: 81 ML/MIN/1.73
EOSINOPHIL # BLD AUTO: 0.5 X10E3/UL (ref 0–0.4)
EOSINOPHIL NFR BLD AUTO: 7 %
ERYTHROCYTE [DISTWIDTH] IN BLOOD BY AUTOMATED COUNT: 14 % (ref 11.6–15.4)
GLOBULIN SER CALC-MCNC: 2.9 G/DL (ref 1.5–4.5)
GLUCOSE SERPL-MCNC: 98 MG/DL (ref 65–99)
HCT VFR BLD AUTO: 46.4 % (ref 37.5–51)
HDLC SERPL-MCNC: 48 MG/DL
HGB BLD-MCNC: 15.3 G/DL (ref 13–17.7)
IMM GRANULOCYTES # BLD AUTO: 0 X10E3/UL (ref 0–0.1)
IMM GRANULOCYTES NFR BLD AUTO: 0 %
LDLC SERPL CALC-MCNC: 59 MG/DL (ref 0–99)
LYMPHOCYTES # BLD AUTO: 1.5 X10E3/UL (ref 0.7–3.1)
LYMPHOCYTES NFR BLD AUTO: 21 %
MCH RBC QN AUTO: 28.1 PG (ref 26.6–33)
MCHC RBC AUTO-ENTMCNC: 33 G/DL (ref 31.5–35.7)
MCV RBC AUTO: 85 FL (ref 79–97)
MONOCYTES # BLD AUTO: 0.7 X10E3/UL (ref 0.1–0.9)
MONOCYTES NFR BLD AUTO: 9 %
NEUTROPHILS # BLD AUTO: 4.4 X10E3/UL (ref 1.4–7)
NEUTROPHILS NFR BLD AUTO: 62 %
PLATELET # BLD AUTO: 250 X10E3/UL (ref 150–450)
POTASSIUM SERPL-SCNC: 4.4 MMOL/L (ref 3.5–5.2)
PROT SERPL-MCNC: 7.2 G/DL (ref 6–8.5)
RBC # BLD AUTO: 5.44 X10E6/UL (ref 4.14–5.8)
SODIUM SERPL-SCNC: 139 MMOL/L (ref 134–144)
T4 FREE SERPL-MCNC: 1.1 NG/DL (ref 0.82–1.77)
TRIGL SERPL-MCNC: 111 MG/DL (ref 0–149)
TSH SERPL DL<=0.005 MIU/L-ACNC: 4.36 UIU/ML (ref 0.45–4.5)
VLDLC SERPL CALC-MCNC: 20 MG/DL (ref 5–40)
WBC # BLD AUTO: 7.1 X10E3/UL (ref 3.4–10.8)

## 2022-05-09 ENCOUNTER — OFFICE VISIT (OUTPATIENT)
Dept: FAMILY MEDICINE CLINIC | Facility: CLINIC | Age: 67
End: 2022-05-09

## 2022-05-09 VITALS
HEIGHT: 72 IN | BODY MASS INDEX: 25.47 KG/M2 | WEIGHT: 188 LBS | SYSTOLIC BLOOD PRESSURE: 132 MMHG | TEMPERATURE: 95.9 F | DIASTOLIC BLOOD PRESSURE: 90 MMHG | HEART RATE: 85 BPM | OXYGEN SATURATION: 99 %

## 2022-05-09 DIAGNOSIS — E78.2 MIXED HYPERLIPIDEMIA: ICD-10-CM

## 2022-05-09 DIAGNOSIS — Z00.00 MEDICARE ANNUAL WELLNESS VISIT, SUBSEQUENT: Primary | ICD-10-CM

## 2022-05-09 DIAGNOSIS — E78.2 MIXED HYPERLIPIDEMIA: Primary | ICD-10-CM

## 2022-05-09 DIAGNOSIS — F42.2 MIXED OBSESSIONAL THOUGHTS AND ACTS: ICD-10-CM

## 2022-05-09 DIAGNOSIS — S11.21XS TEAR OF ESOPHAGUS, SEQUELA: ICD-10-CM

## 2022-05-09 DIAGNOSIS — F41.9 ANXIETY AND DEPRESSION: ICD-10-CM

## 2022-05-09 DIAGNOSIS — F32.A ANXIETY AND DEPRESSION: ICD-10-CM

## 2022-05-09 DIAGNOSIS — Z12.11 ENCOUNTER FOR COLORECTAL CANCER SCREENING: ICD-10-CM

## 2022-05-09 DIAGNOSIS — R13.19 ESOPHAGEAL DYSPHAGIA: ICD-10-CM

## 2022-05-09 DIAGNOSIS — Z12.12 ENCOUNTER FOR COLORECTAL CANCER SCREENING: ICD-10-CM

## 2022-05-09 PROCEDURE — 90677 PCV20 VACCINE IM: CPT | Performed by: FAMILY MEDICINE

## 2022-05-09 PROCEDURE — G0439 PPPS, SUBSEQ VISIT: HCPCS | Performed by: FAMILY MEDICINE

## 2022-05-09 PROCEDURE — 99213 OFFICE O/P EST LOW 20 MIN: CPT | Performed by: FAMILY MEDICINE

## 2022-05-09 PROCEDURE — 1160F RVW MEDS BY RX/DR IN RCRD: CPT | Performed by: FAMILY MEDICINE

## 2022-05-09 PROCEDURE — G0009 ADMIN PNEUMOCOCCAL VACCINE: HCPCS | Performed by: FAMILY MEDICINE

## 2022-05-09 PROCEDURE — 1170F FXNL STATUS ASSESSED: CPT | Performed by: FAMILY MEDICINE

## 2022-05-09 RX ORDER — CLOMIPRAMINE HYDROCHLORIDE 50 MG/1
150 CAPSULE ORAL NIGHTLY
Qty: 270 CAPSULE | Refills: 1 | Status: SHIPPED | OUTPATIENT
Start: 2022-05-09 | End: 2022-06-01 | Stop reason: SDUPTHER

## 2022-05-09 NOTE — PROGRESS NOTES
The ABCs of the Annual Wellness Visit  Subsequent Medicare Wellness Visit    Chief Complaint   Patient presents with   • Hyperlipidemia   • Medicare Wellness-subsequent      Subjective    History of Present Illness:  Leonardo Youssef is a 67 y.o. male who presents for a Subsequent Medicare Wellness Visit.  He is also here for follow-up on hyperlipidemia.Patient has long history of hyperlipidemia denies any body ache, nausea vomiting.  Denies any problem with medication.    Patient with history of anxiety depression and OCD Cloimipramine, symptoms are stable.  The following portions of the patient's history were reviewed and   updated as appropriate: allergies, current medications, past family history, past medical history, past social history, past surgical history and problem list.    Compared to one year ago, the patient feels his physical   health is better.    Compared to one year ago, the patient feels his mental   health is better.    Recent Hospitalizations:  He was not admitted to the hospital during the last year.       Current Medical Providers:  Patient Care Team:  Renuka Costello MD as PCP - General (Family Medicine)  Lebron Addison MD as Referring Physician (Hospitalist)  Cezar Streeter MD PhD as Consulting Physician (Hematology and Oncology)    Outpatient Medications Prior to Visit   Medication Sig Dispense Refill   • atorvastatin (LIPITOR) 40 MG tablet TAKE 1 TABLET BY MOUTH EVERY DAY 90 tablet 1   • Eliquis 5 MG tablet tablet TAKE 1 TABLET BY MOUTH EVERY 12 HOURS 60 tablet 5   • multivitamin with minerals tablet tablet Take 1 tablet by mouth Daily.     • pantoprazole (PROTONIX) 40 MG EC tablet TAKE 1 TABLET BY MOUTH EVERY DAY BEFORE BREAKFAST 90 tablet 1   • clomiPRAMINE (ANAFRANIL) 50 MG capsule Take 150 mg by mouth Every Night.     • ketoconazole (NIZORAL) 2 % cream APPLY TO AFFECTED AREAS ON FACE AND HANDS TWICE A DAY FOR FLARES       No facility-administered medications prior to visit.       No  "opioid medication identified on active medication list. I have reviewed chart for other potential  high risk medication/s and harmful drug interactions in the elderly.          Aspirin is not on active medication list.  Aspirin use is contraindicated for this patient due to: current use of Eliquis.  .    Patient Active Problem List   Diagnosis   • Breast tenderness in male   • Family history of multiple myeloma   • OCD (obsessive compulsive disorder)   • Anxiety and depression   • Epigastric pain   • Abnormal CT of the abdomen   • Acute kidney injury superimposed on chronic kidney disease (HCC)   • Acute respiratory failure with hypoxia (HCC)   • Aspiration pneumonia (HCC)   • Sepsis associated hypotension (HCC)   • Dysphagia   • Hypophosphatemia   • PICC (peripherally inserted central catheter) in place   • Acute urinary retention   • Esophageal tear   • Acute deep vein thrombosis (DVT) of lower extremity (HCC)   • Cerebellar infarct (HCC)   • Anemia   • Esophageal dysphagia   • Esophagus tear   • Esophagitis, eosinophilic   • Gastroesophageal reflux disease with esophagitis   • Anticoagulation adequate   • History of deep vein thrombosis (DVT) of lower extremity   • Cerebrovascular accident (CVA) due to embolism of precerebral artery (HCC)   • Medial epicondylitis of right elbow   • History of CVA with residual deficit   • Dizziness on standing     Advance Care Planning  Advance Directive is not on file.  ACP discussion was held with the patient during this visit. Patient does not have an advance directive, information provided.          Objective    Vitals:    05/09/22 1435   BP: 132/90   Pulse: 85   Temp: 95.9 °F (35.5 °C)   SpO2: 99%   Weight: 85.3 kg (188 lb)   Height: 182.9 cm (72\")     BMI Readings from Last 1 Encounters:   05/09/22 25.50 kg/m²   BMI is above normal parameters. Recommendations include: none ,normal bmi    Does the patient have evidence of cognitive impairment? No    Physical " Exam  Constitutional:       Appearance: Normal appearance. He is well-developed.   HENT:      Head: Normocephalic and atraumatic.      Right Ear: Tympanic membrane, ear canal and external ear normal.      Left Ear: Tympanic membrane, ear canal and external ear normal.      Nose: Nose normal.      Mouth/Throat:      Mouth: Mucous membranes are moist.   Eyes:      General: No scleral icterus.     Extraocular Movements: Extraocular movements intact.      Conjunctiva/sclera: Conjunctivae normal.      Pupils: Pupils are equal, round, and reactive to light.   Neck:      Thyroid: No thyromegaly.   Cardiovascular:      Rate and Rhythm: Normal rate and regular rhythm.      Pulses: Normal pulses.      Heart sounds: Normal heart sounds.   Pulmonary:      Effort: Pulmonary effort is normal. No respiratory distress.      Breath sounds: Normal breath sounds. No wheezing or rales.   Abdominal:      General: Bowel sounds are normal. There is no distension.      Palpations: Abdomen is soft. There is no mass.      Tenderness: There is no abdominal tenderness.      Hernia: No hernia is present.   Musculoskeletal:         General: No swelling or tenderness. Normal range of motion.      Cervical back: Normal range of motion and neck supple.   Lymphadenopathy:      Cervical: No cervical adenopathy.   Skin:     General: Skin is warm.   Neurological:      General: No focal deficit present.      Mental Status: He is alert and oriented to person, place, and time.      Cranial Nerves: No cranial nerve deficit.      Sensory: No sensory deficit.      Deep Tendon Reflexes: Reflexes normal.   Psychiatric:         Mood and Affect: Mood normal.         Behavior: Behavior normal.         Thought Content: Thought content normal.         Judgment: Judgment normal.       Lab Results   Component Value Date    CHLPL 127 05/02/2022    TRIG 111 05/02/2022    HDL 48 05/02/2022    LDL 59 05/02/2022    VLDL 20 05/02/2022            HEALTH RISK  ASSESSMENT    Smoking Status:  Social History     Tobacco Use   Smoking Status Never Smoker   Smokeless Tobacco Never Used     Alcohol Consumption:  Social History     Substance and Sexual Activity   Alcohol Use Not Currently   • Alcohol/week: 1.0 standard drink   • Types: 1 Cans of beer per week    Comment: Per week     Fall Risk Screen:    KATY Fall Risk Assessment was completed, and patient is at LOW risk for falls.Assessment completed on:3/24/2022    Depression Screening:  PHQ-2/PHQ-9 Depression Screening 1/14/2022   Retired PHQ-9 Total Score 0   Retired Total Score 0       Health Habits and Functional and Cognitive Screening:  Functional & Cognitive Status 5/9/2022   Do you have difficulty preparing food and eating? No   Do you have difficulty bathing yourself, getting dressed or grooming yourself? No   Do you have difficulty using the toilet? No   Do you have difficulty moving around from place to place? No   Do you have trouble with steps or getting out of a bed or a chair? No   Current Diet Well Balanced Diet   Dental Exam Up to date   Eye Exam Up to date   Exercise (times per week) 6 times per week   Current Exercises Include Walking   Do you need help using the phone?  No   Are you deaf or do you have serious difficulty hearing?  No   Do you need help with transportation? No   Do you need help shopping? No   Do you need help preparing meals?  No   Do you need help with housework?  No   Do you need help with laundry? No   Do you need help taking your medications? No   Do you need help managing money? No   Do you ever drive or ride in a car without wearing a seat belt? No   Have you felt unusual stress, anger or loneliness in the last month? No   Who do you live with? Spouse   If you need help, do you have trouble finding someone available to you? No   Have you been bothered in the last four weeks by sexual problems? No   Do you have difficulty concentrating, remembering or making decisions? No        Age-appropriate Screening Schedule:  Refer to the list below for future screening recommendations based on patient's age, sex and/or medical conditions. Orders for these recommended tests are listed in the plan section. The patient has been provided with a written plan.    Health Maintenance   Topic Date Due   • ZOSTER VACCINE (1 of 2) Never done   • INFLUENZA VACCINE  08/01/2022   • LIPID PANEL  05/02/2023   • TDAP/TD VACCINES (2 - Td or Tdap) 01/05/2029              Assessment & Plan   CMS Preventative Services Quick Reference  Risk Factors Identified During Encounter  Immunizations Discussed/Encouraged (specific Immunizations; Prevnar 20 (Pneumococcal 20-valent conjugate) and Shingrix  The above risks/problems have been discussed with the patient.  Follow up actions/plans if indicated are seen below in the Assessment/Plan Section.  Pertinent information has been shared with the patient in the After Visit Summary.    Diagnoses and all orders for this visit:    1. Medicare annual wellness visit, subsequent (Primary)    2. Encounter for colorectal cancer screening  -     Ambulatory Referral to Gastroenterology    3. Esophageal dysphagia  -     Ambulatory Referral to Gastroenterology    4. Tear of esophagus, sequela  -     Ambulatory Referral to Gastroenterology    5. Mixed obsessional thoughts and acts    6. Anxiety and depression    7. Mixed hyperlipidemia    Other orders  -     Pneumococcal Conjugate Vaccine 20-Valent (PCV20)         Leonardo Youssef is a 67-year-old male patient came here for  Medicare annual wellness visit, preventive screening discussed with patient.  He is due for colonoscopy I will defer to GI for colonoscopy he is also has a history of esophageal dysphagia , may need repeat EGD.  He is also due for zoster vaccine recommended patient to get it from his pharmacy.  Pap not due again given to patient in the office today.  Continue low-fat diet and physical activity.  History of prostate  cancer in 2014, seeing urology no new issues  He was also seen today for follow-up on  Anxiety depression and OCD symptoms stable continue same  Hyperlipidemia, labs reviewed, LDL HDL at Goal      Follow Up:   No follow-ups on file.     An After Visit Summary and PPPS were made available to the patient.                   Answers for HPI/ROS submitted by the patient on 5/3/2022  What is the primary reason for your visit?: Physical

## 2022-05-24 ENCOUNTER — PRE-PROCEDURE SCREENING (OUTPATIENT)
Dept: GASTROENTEROLOGY | Facility: CLINIC | Age: 67
End: 2022-05-24

## 2022-06-01 DIAGNOSIS — F42.2 MIXED OBSESSIONAL THOUGHTS AND ACTS: ICD-10-CM

## 2022-06-01 DIAGNOSIS — F41.9 ANXIETY AND DEPRESSION: ICD-10-CM

## 2022-06-01 DIAGNOSIS — F32.A ANXIETY AND DEPRESSION: ICD-10-CM

## 2022-06-02 RX ORDER — CLOMIPRAMINE HYDROCHLORIDE 50 MG/1
150 CAPSULE ORAL NIGHTLY
Qty: 270 CAPSULE | Refills: 1 | Status: SHIPPED | OUTPATIENT
Start: 2022-06-02 | End: 2022-09-16 | Stop reason: SDUPTHER

## 2022-06-03 ENCOUNTER — TELEPHONE (OUTPATIENT)
Dept: GASTROENTEROLOGY | Facility: CLINIC | Age: 67
End: 2022-06-03

## 2022-07-13 RX ORDER — PANTOPRAZOLE SODIUM 40 MG/1
TABLET, DELAYED RELEASE ORAL
Qty: 90 TABLET | Refills: 1 | Status: SHIPPED | OUTPATIENT
Start: 2022-07-13 | End: 2023-01-06

## 2022-07-25 ENCOUNTER — OFFICE VISIT (OUTPATIENT)
Dept: ONCOLOGY | Facility: CLINIC | Age: 67
End: 2022-07-25

## 2022-07-25 ENCOUNTER — LAB (OUTPATIENT)
Dept: LAB | Facility: HOSPITAL | Age: 67
End: 2022-07-25

## 2022-07-25 VITALS
RESPIRATION RATE: 16 BRPM | TEMPERATURE: 97.1 F | WEIGHT: 186.6 LBS | HEART RATE: 92 BPM | SYSTOLIC BLOOD PRESSURE: 126 MMHG | HEIGHT: 72 IN | OXYGEN SATURATION: 98 % | BODY MASS INDEX: 25.27 KG/M2 | DIASTOLIC BLOOD PRESSURE: 87 MMHG

## 2022-07-25 DIAGNOSIS — I63.10 CEREBROVASCULAR ACCIDENT (CVA) DUE TO EMBOLISM OF PRECEREBRAL ARTERY: ICD-10-CM

## 2022-07-25 DIAGNOSIS — Z79.01 ANTICOAGULATION ADEQUATE: ICD-10-CM

## 2022-07-25 DIAGNOSIS — Z86.718 HISTORY OF DEEP VEIN THROMBOSIS (DVT) OF LOWER EXTREMITY: ICD-10-CM

## 2022-07-25 DIAGNOSIS — Z79.01 ANTICOAGULATION ADEQUATE: Primary | ICD-10-CM

## 2022-07-25 LAB
ALBUMIN SERPL-MCNC: 4.5 G/DL (ref 3.5–5.2)
ALBUMIN/GLOB SERPL: 1.3 G/DL (ref 1.1–2.4)
ALP SERPL-CCNC: 71 U/L (ref 38–116)
ALT SERPL W P-5'-P-CCNC: 23 U/L (ref 0–41)
ANION GAP SERPL CALCULATED.3IONS-SCNC: 13.8 MMOL/L (ref 5–15)
AST SERPL-CCNC: 23 U/L (ref 0–40)
BASOPHILS # BLD AUTO: 0.08 10*3/MM3 (ref 0–0.2)
BASOPHILS NFR BLD AUTO: 1 % (ref 0–1.5)
BILIRUB SERPL-MCNC: 0.6 MG/DL (ref 0.2–1.2)
BUN SERPL-MCNC: 21 MG/DL (ref 6–20)
BUN/CREAT SERPL: 17.9 (ref 7.3–30)
CALCIUM SPEC-SCNC: 9.9 MG/DL (ref 8.5–10.2)
CHLORIDE SERPL-SCNC: 100 MMOL/L (ref 98–107)
CO2 SERPL-SCNC: 25.2 MMOL/L (ref 22–29)
CREAT SERPL-MCNC: 1.17 MG/DL (ref 0.7–1.3)
DEPRECATED RDW RBC AUTO: 44.1 FL (ref 37–54)
EGFRCR SERPLBLD CKD-EPI 2021: 67.5 ML/MIN/1.73
EOSINOPHIL # BLD AUTO: 0.3 10*3/MM3 (ref 0–0.4)
EOSINOPHIL NFR BLD AUTO: 3.9 % (ref 0.3–6.2)
ERYTHROCYTE [DISTWIDTH] IN BLOOD BY AUTOMATED COUNT: 14.6 % (ref 12.3–15.4)
GLOBULIN UR ELPH-MCNC: 3.5 GM/DL (ref 1.8–3.5)
GLUCOSE SERPL-MCNC: 88 MG/DL (ref 74–124)
HCT VFR BLD AUTO: 46 % (ref 37.5–51)
HGB BLD-MCNC: 15.4 G/DL (ref 13–17.7)
IMM GRANULOCYTES # BLD AUTO: 0.01 10*3/MM3 (ref 0–0.05)
IMM GRANULOCYTES NFR BLD AUTO: 0.1 % (ref 0–0.5)
LYMPHOCYTES # BLD AUTO: 1.54 10*3/MM3 (ref 0.7–3.1)
LYMPHOCYTES NFR BLD AUTO: 20.1 % (ref 19.6–45.3)
MCH RBC QN AUTO: 28.4 PG (ref 26.6–33)
MCHC RBC AUTO-ENTMCNC: 33.5 G/DL (ref 31.5–35.7)
MCV RBC AUTO: 84.9 FL (ref 79–97)
MONOCYTES # BLD AUTO: 0.67 10*3/MM3 (ref 0.1–0.9)
MONOCYTES NFR BLD AUTO: 8.7 % (ref 5–12)
NEUTROPHILS NFR BLD AUTO: 5.07 10*3/MM3 (ref 1.7–7)
NEUTROPHILS NFR BLD AUTO: 66.2 % (ref 42.7–76)
NRBC BLD AUTO-RTO: 0 /100 WBC (ref 0–0.2)
PLATELET # BLD AUTO: 280 10*3/MM3 (ref 140–450)
PMV BLD AUTO: 9.2 FL (ref 6–12)
POTASSIUM SERPL-SCNC: 4.1 MMOL/L (ref 3.5–4.7)
PROT SERPL-MCNC: 8 G/DL (ref 6.3–8)
RBC # BLD AUTO: 5.42 10*6/MM3 (ref 4.14–5.8)
SODIUM SERPL-SCNC: 139 MMOL/L (ref 134–145)
WBC NRBC COR # BLD: 7.67 10*3/MM3 (ref 3.4–10.8)

## 2022-07-25 PROCEDURE — 99214 OFFICE O/P EST MOD 30 MIN: CPT | Performed by: INTERNAL MEDICINE

## 2022-07-25 PROCEDURE — 85025 COMPLETE CBC W/AUTO DIFF WBC: CPT

## 2022-07-25 PROCEDURE — 36415 COLL VENOUS BLD VENIPUNCTURE: CPT

## 2022-07-25 PROCEDURE — 80053 COMPREHEN METABOLIC PANEL: CPT

## 2022-07-25 NOTE — PROGRESS NOTES
.     REASON FOR FOLLOW-UP:     1. Subacute stroke involving the right cerebellum, and also bilateral lower extremity calf vein thrombosis in April 2020.    · Hypercoagulable work-up was completely negative.    · Patient is on Eliquis anticoagulation since 04/27/2020.   · Venous Doppler study on 7/10/2020 reported resolution of bilateral lower extremity calf vein thrombosis.    2.  Anemia Hb 8.2 in April 2020 during his hospitalization.    · Laboratory studies showed appropriate ferritin level 189 and slightly low iron saturation 12%.  Normal vitamin B12 level and folate level.  Hemoglobin was improving without transfusion.  · Anemia resolved on 7/17/2020 with Hb 14.7.                                HISTORY OF PRESENT ILLNESS: Patient is a 67 y.o. male who presents today for 6-month follow-up for the above problems.     Patient reports he is doing well, tolerating anticoagulation with Eliquis.  He denies easy bleeding or bruising. No melena hematochezia. He also has no evidence for recurrent stroke or DVT.  Denies chest pain, no extremity edema or pain.      Patient has excellent performance status ECOG 0.    He has no complaints at this time.     Today on 7/25/2022 patient has completed normal CBC, and baseline unremarkable CMP with creatinine 1.17.       Past Medical History:   Diagnosis Date   • Anxiety    • Aspiration pneumonia ([facility])    • Benign prostatic hyperplasia    • Depression    • DVT, bilateral lower limbs ([facility])    • Dysphagia    • Falls     2 falls due to ( 1) balance  (2) tripped   • GERD (gastroesophageal reflux disease) April 2020    Emergency Room Diagnosis   • HL (hearing loss)    • Hx of radiation therapy    • Kidney stone    • Lightheadedness    • OCD (obsessive compulsive disorder)    • Prostate cancer ([facility])     prostate   • Stroke (cerebrum) ([facility]) 05/2020   • Ulcer April 2020    Emergency Room Diagnosis   · Respiratory failure, required ventilation support in April 2020.  · Coronary artery  disease/ischemic disease with cardiac catheterization on 4/27/2020.   ·   Past Surgical History:   Procedure Laterality Date   • CARDIAC CATHETERIZATION N/A 4/27/2020    Procedure: Left Heart Cath;  Surgeon: Remi John MD;  Location: [facility] CATH INVASIVE LOCATION;  Service: Cardiovascular;  Laterality: N/A;   • CARDIAC CATHETERIZATION N/A 4/27/2020    Procedure: Coronary angiography;  Surgeon: Remi John MD;  Location: [facility] CATH INVASIVE LOCATION;  Service: Cardiovascular;  Laterality: N/A;   • CARDIAC CATHETERIZATION N/A 4/27/2020    Procedure: Left ventriculography;  Surgeon: Remi John MD;  Location: [facility] CATH INVASIVE LOCATION;  Service: Cardiovascular;  Laterality: N/A;   • ENDOSCOPY N/A 4/16/2020    Procedure: ESOPHAGOGASTRODUODENOSCOPY WITH COLD BIOPSIES;  Surgeon: Renetta Oden MD;  Location: [facility] ENDOSCOPY;  Service: Gastroenterology;  Laterality: N/A;  PRE- DYSPHAGIA, ABNORMAL CT  POST- ESOPHAGITIS WITH LARGE ESOPHAGEAL ULCER, HIATAL HERNIA, RETAINED FOOD   • ENDOSCOPY N/A 6/20/2020    Procedure: ESOPHAGOGASTRODUODENOSCOPY with biopsies;  Surgeon: Rosanna Ghosh MD;  Location: [facility] ENDOSCOPY;  Service: Gastroenterology;  Laterality: N/A;  pre- hx esophageal tear  post- esophadeal ring, hiatal hernia, gastritis, irregular z line, esophageal diverticumum   • UPPER GASTROINTESTINAL ENDOSCOPY  07/2020    esophageal tear     ONCOLOGIC/HEMATOLOGIC HISTORY: (History from previous dates can be found in the separate document.)    The patient is a 65 y.o. year old male who has history of prostate cancer post radiation therapy in [place] 2000, more than 15 years ago, and also history of anxiety/depression, and chronic dysphagia, who has complicated medical history this time since his admission on 04/16/2020. This patient presented to the emergency room because of dyspnea and he was found having bilateral pneumonia and respiratory failure. He was actually admitted to the  Intensive Care Unit and was on ventilation support. He was tested negative for COVID-19 infection. Nevertheless he had CT scan examination on 04/16/2020 which reported circumferential thickening of the distal esophagus, and also bilateral pulmonary infiltration. The patient was seen by GI service and Juan Babcock MD, did EGD examination on 04/16/2020. This study reported ulceration and mucosal breaks involving at least 75% of the esophageal circumference with esophagitis but no recent bleeding. Biopsy was taken. Pathology evaluation reported no evidence of malignancy. There was moderate active inflammation with eosinophil. The patient had gastric tube placed due to his GI problem.     This patient seems also had stroke evident on brain MRI examination, at subacute infarction of the lower right cerebellum. There was also post infarct enhancement and evidence of small petechial hemorrhage.      This patient had bilateral venous duplex study in the lower extremities on 04/26/2020 and this study reported right leg acute venous thrombosis in the peroneal vein and left leg acute deep vein thrombosis involving the peroneal and gastrocnemius veins. All other veins were normal bilaterally.      The patient currently has been on Eliquis anticoagulation which I agree with.      The patient reports he had issue with dysphagia for quite a significant time and sometimes with food stuck. Most time he was able to cough it out or swallow it without further incident, however this time prior to the ER visit he really had food stuck and was kind of choking. He was brought up to the emergency room by ambulance.       I saw him as a consult during his hospitalization in April 2020 after a stroke that required ICU admission from 4/16/2020 to 4/30/2020.    Patient reports he is on Eliquis anticoagulation 5 mg twice a day, denies bleeding or bruising.  He is recovering from his stroke, he still has some imbalance month but improved.  He  denies headaches vision changes.  His performance status is ECOG 1-0.    Patient reports he continues taking Protonix.  He denies melena hematochezia.    He has no complaints at this time.     I reviewed the medical records, and found out that he had repeated EGD examination 6/20/2020 which reported evidence of esophagitis and gastritis.  This is confirmed by by pathology evaluation.      Laboratory studies, on 7/17/2020 reported normalized hemoglobin 14.7.  He has normal platelets 219,000 and WBC 6930 including ANC 4070 lymphocytes 1600.     I summarized the results of hypercoagulable work-up for him.  In April 2020 we obtain comprehensive laboratory work-up during his hospitalization.  He had a negative work-up.  He was tested negative for factor II mutation, factor V Leyden mutation, and a normal Antithrombin activity 103%, protein C activity 109% and protein S activity 95%.  He was tested negative for anticardiolipin antibodies, anti-beta-2 5.2  Antibodies and anti-phosphatidylserine antibodies in the negative for lupus anticoagulant.    Lab results in April 2020 reported ferritin 189, iron saturation 12%, free iron 28 TIBC 228 and vitamin B12 at 541 and RBC folate 1025 ng/mL on 4/22/2020.  Had elevated C-reactive protein 4.27 mg/dL.  His hemoglobin was 10.1, platelets 199,000 and WBC 7180 on the same day.  Serum folate was 8.4 ng/mL on 4/29/2020.      Laboratory studies on 7/23/2021 reported complete normal CBC including Hb 15.4, platelets 241,000 WBC 6470.     MEDICATIONS    Current Outpatient Medications:   •  atorvastatin (LIPITOR) 40 MG tablet, TAKE 1 TABLET BY MOUTH EVERY DAY, Disp: 90 tablet, Rfl: 1  •  clomiPRAMINE (ANAFRANIL) 50 MG capsule, Take 3 capsules by mouth Every Night., Disp: 270 capsule, Rfl: 1  •  Eliquis 5 MG tablet tablet, TAKE 1 TABLET BY MOUTH EVERY 12 HOURS, Disp: 60 tablet, Rfl: 5  •  multivitamin with minerals tablet tablet, Take 1 tablet by mouth Daily., Disp: , Rfl:   •  pantoprazole  "(PROTONIX) 40 MG EC tablet, TAKE 1 TABLET BY MOUTH EVERY DAY BEFORE BREAKFAST, Disp: 90 tablet, Rfl: 1    ALLERGIES:   No Known Allergies    SOCIAL HISTORY:       Social History     Socioeconomic History   • Marital status:      Spouse name: Kelsye   • Number of children: 2   Tobacco Use   • Smoking status: Never Smoker   • Smokeless tobacco: Never Used   Vaping Use   • Vaping Use: Never used   Substance and Sexual Activity   • Alcohol use: Not Currently     Alcohol/week: 1.0 standard drink     Types: 1 Cans of beer per week     Comment: Per week   • Drug use: No   • Sexual activity: Not Currently     Partners: Female     Birth control/protection: Post-menopausal         FAMILY HISTORY:  Family History   Problem Relation Age of Onset   • Glaucoma Mother    • Alzheimer's disease Mother    • Hypertension Father    • Diabetes Father    • Liver cancer Father    • Nephrolithiasis Father    • Multiple myeloma Sister    • Other Brother         liver transplant   • Multiple myeloma Brother    • Liver cancer Brother    • Prostate cancer Brother    • Arthritis Other    • Diabetes Other    • Cancer Other    • Malig Hyperthermia Neg Hx               Vitals:    07/25/22 1049   BP: 126/87   Pulse: 92   Resp: 16   Temp: 97.1 °F (36.2 °C)   TempSrc: Temporal   SpO2: 98%   Weight: 84.6 kg (186 lb 9.6 oz)   Height: 182.9 cm (72.01\")   PainSc: 0-No pain     Current Status 7/25/2022   ECOG score 0      PHYSICAL EXAM:    GENERAL:  Well-developed, well-nourished male in no acute distress.  Orientated to time place and the people.  SKIN:  Warm, dry without rashes, purpura or petechiae.  HEENT:  Normocephalic.  Wearing mask.   LYMPHATICS:  No cervical, supraclavicular adenopathy.  CHEST: Normal respiratory effort.  Lungs clear to auscultation. Good airflow.  CARDIAC:  Regular rate and rhythm without murmurs. Normal S1,S2.  ABDOMEN:  Soft, nontender with no organomegaly or masses.  Bowel sounds normal.  EXTREMITIES:  No clubbing, " cyanosis or edema.  NEUROLOGICAL:  Grossly intact.  No focal neurological deficits.  PSYCHIATRIC:  Normal affect and mood.      RECENT LABS:  Lab Results   Component Value Date    WBC 7.67 07/25/2022    HGB 15.4 07/25/2022    HCT 46.0 07/25/2022    MCV 84.9 07/25/2022     07/25/2022     Lab Results   Component Value Date    NEUTROABS 5.07 07/25/2022     Lab Results   Component Value Date    GLUCOSE 88 07/25/2022    BUN 21 (H) 07/25/2022    CREATININE 1.17 07/25/2022    EGFRIFNONA 63 04/30/2021    EGFRIFAFRI 82 04/16/2019    BCR 17.9 07/25/2022    K 4.1 07/25/2022    CO2 25.2 07/25/2022    CALCIUM 9.9 07/25/2022    PROTENTOTREF 7.2 05/02/2022    ALBUMIN 4.50 07/25/2022    LABIL2 1.5 05/02/2022    AST 23 07/25/2022    ALT 23 07/25/2022       Assessment & Plan     ASSESSMENT:  1. Subacute stroke involving the right cerebellum, and also bilateral lower extremity calf vein thrombosis in April 2020.    · His brain MRI on 4/24/2020 also reported chronic small vascular ischemic disease.    · Hypercoagulable work-up in April 2020 was completely negative.    · Patient currently is on Eliquis anticoagulation since  04/27/2020 after initial Lovenox treatment.   · Venous Doppler study on 7/10/2020 reported resolution of bilateral lower extremity calf vein thrombosis.    · I discussed with the patient on 7/17/2020 about his laboratory work-up which is completely negative for hypercoagulable status in April 2020 when he suffered a significant stroke in the cerebellum.  Despite complete resolution of the calf vein thrombosis, I think his stroke in the cerebellum was significant.  He needs long-term anticoagulation.  I recommended to continue anticoagulation.    · Patient is examined 1/15/2021, tolerating Eliquis anticoagulation.  This will be continued.  · On 7/23/2021, patient reports good tolerance with no easy bleeding or bruising.  No new signs of stroke or DVT.  Continue Eliquis.  · On 1/14/2022, patient reports doing  well, no recurrence of stroke, no evidence for DVT.  Tolerating Eliquis anticoagulation 5 mg twice a day.  This will be continued.  · On 7/25/2022 patient reports good tolerance to Eliquis anticoagulation.  No evidence for recurrent stroke or DVT.  Will recommend to continue Eliquis for lifetime.    2.  Anemia in April 2020 during his hospitalization.   · Laboratory studies showed appropriate ferritin level 189 and slightly low iron saturation 12% in April 2020.  Normal vitamin B12 level and folate level.   · Patient has normalized hemoglobin 14.7 on 7/17/2020.  · Continue to have normal hemoglobin 14.8 on 1/15/2021.  Continue to monitor due to risk of bleeding on anticoagulation.  · Outside lab reported hemoglobin 13.0 on 4/30/2021.    · Normal hemoglobin 15.4 on 7/23/2021.  Continue to monitor.  · On 1/14/2022 hemoglobin 14.5 MCV 87.3.  · Normal hemoglobin 15.4 with on 7/25/2022.    3. Esophageal ulcer status post EGD on 04/16/2020, biopsy sample showed no evidence of malignancy.   · The patient was followed by GI service, started on Protonix.  · Repeated EGD examination 6/20/2020 reported esophagitis and gastritis.  No esophageal ulcer.    4.  COVID-19 vaccination.    · Patient reports in July 2021 that he is fully vaccinated against COVID-19.      PLAN:  1. Continue Eliquis 5 mg twice a day.  I E scribed new prescription to his pharmacy for total 6-month supply.  This patient will need long-term anticoagulation.  This is agreed by neurology service.    2. Continue follow-up with cardiology service.  3. Continue Protonix, and follow-up with GI service.   4. Follow-up with me in 6 months with labs- CBC check.  5. Call the office with any worsening symptoms or CP, SOA.       SALVADOR FAY M.D., Ph.D.    7/25/2022.      CC:  MD Yuliya Alegria APRN.    MD Bakari Nelson MD / RONNA Garcia MD

## 2022-09-06 ENCOUNTER — TELEPHONE (OUTPATIENT)
Dept: FAMILY MEDICINE CLINIC | Facility: CLINIC | Age: 67
End: 2022-09-06

## 2022-09-06 NOTE — TELEPHONE ENCOUNTER
Caller: Kelsey Youssef    Relationship to patient: Emergency Contact    Best call back number: [phone]    Type of visit: MEDICARE WELLNESS    Requested date: WEEK OF 11/14, AFTER 3 PM    If rescheduling, when is the original appointment: 11/9    Additional notes: PATIENT CAN NOT COME TO HIS APPT ON 11/9, NO OPENINGS AGAIN UNTIL January.

## 2022-09-16 DIAGNOSIS — F32.A ANXIETY AND DEPRESSION: ICD-10-CM

## 2022-09-16 DIAGNOSIS — F42.2 MIXED OBSESSIONAL THOUGHTS AND ACTS: ICD-10-CM

## 2022-09-16 DIAGNOSIS — F41.9 ANXIETY AND DEPRESSION: ICD-10-CM

## 2022-09-16 RX ORDER — CLOMIPRAMINE HYDROCHLORIDE 50 MG/1
150 CAPSULE ORAL NIGHTLY
Qty: 270 CAPSULE | Refills: 1 | Status: SHIPPED | OUTPATIENT
Start: 2022-09-16

## 2022-09-16 NOTE — TELEPHONE ENCOUNTER
Rx Refill Note  Requested Prescriptions     Pending Prescriptions Disp Refills   • clomiPRAMINE (ANAFRANIL) 50 MG capsule 270 capsule 1     Sig: Take 3 capsules by mouth Every Night.      Last office visit with prescribing clinician: 5/9/2022      Next office visit with prescribing clinician: 11/17/2022            Danielle Patel MA  09/16/22, 12:10 EDT

## 2022-09-29 ENCOUNTER — TELEPHONE (OUTPATIENT)
Dept: ONCOLOGY | Facility: CLINIC | Age: 67
End: 2022-09-29

## 2022-09-29 NOTE — TELEPHONE ENCOUNTER
Dental Clearance sent to [facility] Per Dr Streeter:  Patient to HOLD Eliquis 2 days before his scheduled teeth extraction and one day after his teeth extraction.  Faxed to [phone]

## 2022-10-10 RX ORDER — ATORVASTATIN CALCIUM 40 MG/1
TABLET, FILM COATED ORAL
Qty: 90 TABLET | Refills: 1 | Status: SHIPPED | OUTPATIENT
Start: 2022-10-10 | End: 2023-03-24

## 2022-11-04 ENCOUNTER — LAB (OUTPATIENT)
Dept: LAB | Facility: HOSPITAL | Age: 67
End: 2022-11-04

## 2022-11-04 PROCEDURE — 80061 LIPID PANEL: CPT | Performed by: FAMILY MEDICINE

## 2022-11-04 PROCEDURE — 80053 COMPREHEN METABOLIC PANEL: CPT | Performed by: FAMILY MEDICINE

## 2022-11-17 ENCOUNTER — OFFICE VISIT (OUTPATIENT)
Dept: FAMILY MEDICINE CLINIC | Facility: CLINIC | Age: 67
End: 2022-11-17

## 2022-11-17 VITALS
OXYGEN SATURATION: 98 % | BODY MASS INDEX: 25.79 KG/M2 | TEMPERATURE: 94.8 F | HEART RATE: 101 BPM | HEIGHT: 72 IN | DIASTOLIC BLOOD PRESSURE: 84 MMHG | WEIGHT: 190.4 LBS | SYSTOLIC BLOOD PRESSURE: 130 MMHG

## 2022-11-17 DIAGNOSIS — E78.2 MIXED HYPERLIPIDEMIA: ICD-10-CM

## 2022-11-17 DIAGNOSIS — H65.193 ACUTE EFFUSION OF BOTH MIDDLE EARS: Primary | ICD-10-CM

## 2022-11-17 DIAGNOSIS — J34.0 NASAL ULCER: ICD-10-CM

## 2022-11-17 DIAGNOSIS — R93.89 ABNORMAL CT SCAN, CHEST: ICD-10-CM

## 2022-11-17 PROCEDURE — 99214 OFFICE O/P EST MOD 30 MIN: CPT | Performed by: FAMILY MEDICINE

## 2022-11-17 RX ORDER — PREDNISONE 20 MG/1
20 TABLET ORAL DAILY
Qty: 7 TABLET | Refills: 0 | Status: SHIPPED | OUTPATIENT
Start: 2022-11-17 | End: 2023-01-27

## 2022-11-17 RX ORDER — AZELASTINE 1 MG/ML
2 SPRAY, METERED NASAL 2 TIMES DAILY
Qty: 30 EACH | Refills: 3 | Status: SHIPPED | OUTPATIENT
Start: 2022-11-17 | End: 2023-01-27

## 2022-11-17 RX ORDER — AZITHROMYCIN 250 MG/1
TABLET, FILM COATED ORAL
Qty: 6 TABLET | Refills: 0 | Status: SHIPPED | OUTPATIENT
Start: 2022-11-17 | End: 2023-01-27

## 2022-11-17 NOTE — PROGRESS NOTES
"Chief Complaint  Covid (Pt recently had covid, still feeling fatigue and some nasal congestion) and Hyperlipidemia    Subjective        Leonardo Youssef presents to [facility]  History of Present Illness  Leonardo is a 67-year-old male seen today for hyperlipidemia and a recent COVID-19 infection.  Patient giving history of having symptoms since 1 month.  He went to urgent care 3 times last month.  First 2 times he was diagnosed with URI.  On his last visit to urgent care on October 27 he was tested positive for COVID-19 and thrush.     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 He tested positive for COVID-19 and thrush. He is feeling better each day. He still has a productive cough. He denies any shortness of breath. He still has some ear and throat pain.   He is also here for follow-up on hyperlipidemia.  Patient has long history of hyperlipidemia denies any body ache, nausea vomiting.  Denies any problem with medication.      He confirms the use of Lipitor, Eliquis and chloperamine.     He has a past surgery history of wisdom teeth extraction.     he states he has had a cut in his nostril for years that does not heal. He states it sometimes affect his ability to breath. He states if he hits it, it will bleed.   Objective   Vital Signs:  /84   Pulse 101   Temp 94.8 °F (34.9 °C)   Ht 182.9 cm (72.01\")   Wt 86.4 kg (190 lb 6.4 oz)   SpO2 98%   BMI 25.82 kg/m²   Estimated body mass index is 25.82 kg/m² as calculated from the following:    Height as of this encounter: 182.9 cm (72.01\").    Weight as of this encounter: 86.4 kg (190 lb 6.4 oz).          Physical Exam  Constitutional:       Appearance: Normal appearance. He is well-developed.   HENT:      Head: Normocephalic and atraumatic.      Right Ear: Tympanic membrane, ear canal and external ear normal.      Left Ear: Tympanic membrane, ear canal and external ear normal.      Nose: Nose normal.    "   Mouth/Throat:      Mouth: Mucous membranes are moist.   Eyes:      General: No scleral icterus.     Extraocular Movements: Extraocular movements intact.      Conjunctiva/sclera: Conjunctivae normal.      Pupils: Pupils are equal, round, and reactive to light.   Neck:      Thyroid: No thyromegaly.   Cardiovascular:      Rate and Rhythm: Normal rate and regular rhythm.      Pulses: Normal pulses.      Heart sounds: Normal heart sounds.   Pulmonary:      Effort: Pulmonary effort is normal. No respiratory distress.      Breath sounds: Normal breath sounds. No wheezing or rales.   Abdominal:      General: Bowel sounds are normal. There is no distension.      Palpations: Abdomen is soft. There is no mass.      Tenderness: There is no abdominal tenderness.      Hernia: No hernia is present.   Musculoskeletal:         General: No swelling or tenderness. Normal range of motion.      Cervical back: Normal range of motion and neck supple.   Lymphadenopathy:      Cervical: No cervical adenopathy.   Skin:     General: Skin is warm.   Neurological:      General: No focal deficit present.      Mental Status: He is alert and oriented to person, place, and time.      Cranial Nerves: No cranial nerve deficit.      Sensory: No sensory deficit.      Deep Tendon Reflexes: Reflexes normal.   Psychiatric:         Mood and Affect: Mood normal.         Behavior: Behavior normal.         Thought Content: Thought content normal.         Judgment: Judgment normal.        Result Review :                Assessment and Plan   Diagnoses and all orders for this visit:    1. Acute effusion of both middle ears (Primary)  -     azithromycin (Zithromax) 250 MG tablet; Take 2 tablets the first day, then 1 tablet daily for 4 days.  Dispense: 6 tablet; Refill: 0  -     azelastine (ASTELIN) 0.1 % nasal spray; 2 sprays into the nostril(s) as directed by provider 2 (Two) Times a Day.  Dispense: 30 each; Refill: 3  -     Ambulatory Referral to ENT  (Otolaryngology)    2. Nasal ulcer  -     Ambulatory Referral to ENT (Otolaryngology)    3. Mixed hyperlipidemia  Leonardo is a 67-year-old male patient seen today for a follow-up on hyperlipidemia. Lab results discussed continue same.  4.COVID-19   The patient is status post almost 14 days from COVID-19 infection, he is having URI symptoms for more than a month. He went to the urgent care three times. He has a bilateral ear effusion. He will start on  prednisone, antibiotics and nasal spray. The patient is complaining of chronic ulcer and right nostril that bleeds on touch. I was not able to appreciate  ulcer very well today as he has bilateral turbinate swelling. He will be referred to ENT for further evaluation.  Other orders  -     predniSONE (DELTASONE) 20 MG tablet; Take 1 tablet by mouth Daily.  Dispense: 7 tablet; Refill: 0  Addendum     IMPRESSION: 7/29/21  1. Dilatation of the ascending thoracic aorta as described. This finding  appears relatively stable since the 04/23/2020 study.  2. Interval clearing of bilateral lower lobe atelectasis and/or  pneumonia and bilateral pleural effusions since the previous study.  3. Stable tiny lung nodules.  4. Repeat CT of the chest without contrast in approximately 1 year  recommended to assess stability of these findings.  Patient with history of CVA and and DVT on Eliquis.  Has seen cardiology and had a CT angiogram done last year.    On CT scan he has descending aorta dilatation and lung nodule.  We  will need a repeat CT of chest as recommended by radiologist.                 Follow Up   No follow-ups on file.  Patient was given instructions and counseling regarding his condition or for health maintenance advice. Please see specific information pulled into the AVS if appropriate.       Answers for HPI/ROS submitted by the patient on 11/14/2022  What is the primary reason for your visit?: Physical  Transcribed from ambient dictation for Renuka Costello MD by Kayley  David.  11/17/22   13:51 EST    Patient or patient representative verbalized consent to the visit recording.

## 2023-01-06 RX ORDER — PANTOPRAZOLE SODIUM 40 MG/1
TABLET, DELAYED RELEASE ORAL
Qty: 90 TABLET | Refills: 1 | Status: SHIPPED | OUTPATIENT
Start: 2023-01-06

## 2023-01-06 NOTE — TELEPHONE ENCOUNTER
Darian request received for pantoprazole 40 mg daily     O/v 12/2/21.  Upcoming appt 4/13/23.     See 5/9/22 referral for c/s and OA notes of 5/24/22.     Message to DR Ghosh.

## 2023-01-24 NOTE — PROGRESS NOTES
.     REASON FOR FOLLOW-UP:     1. Subacute stroke involving the right cerebellum, and also bilateral lower extremity calf vein thrombosis in April 2020.    · Hypercoagulable work-up was completely negative.    · Patient is on Eliquis anticoagulation since 04/27/2020.   · Venous Doppler study on 7/10/2020 reported resolution of bilateral lower extremity calf vein thrombosis.    2.  Anemia Hb 8.2 in April 2020 during his hospitalization.    · Laboratory studies showed appropriate ferritin level 189 and slightly low iron saturation 12%.  Normal vitamin B12 level and folate level.  Hemoglobin was improving without transfusion.  · Anemia resolved on 7/17/2020 with Hb 14.7.    HISTORY OF PRESENT ILLNESS: Patient is a 67 y.o. male who presents today for 6-month follow-up for the above problems.     He continues on Eliquis 5 mg twice daily.  Tolerating Eliquis well without signs or symptoms of bleeding. He continues to tolerate Eliquis well without signs or symptoms of bleeding.  He reports being compliant with his Eliquis.  He has not had any evidence of recurrent stroke or DVT.  He has no new concerns today.    Past Medical History:   Diagnosis Date   • Anxiety    • Aspiration pneumonia ([facility])    • Benign prostatic hyperplasia    • Depression    • DVT, bilateral lower limbs (HCC)    • Dysphagia    • Falls     2 falls due to ( 1) balance  (2) tripped   • GERD (gastroesophageal reflux disease) April 2020    Emergency Room Diagnosis   • HL (hearing loss)    • Hx of radiation therapy    • Kidney stone    • Lightheadedness    • OCD (obsessive compulsive disorder)    • Prostate cancer (HCC)     prostate   • Stroke (cerebrum) ([facility]) 05/2020   • Ulcer April 2020    Emergency Room Diagnosis   · Respiratory failure, required ventilation support in April 2020.  · Coronary artery disease/ischemic disease with cardiac catheterization on 4/27/2020.   ·   Past Surgical History:   Procedure Laterality Date   • CARDIAC CATHETERIZATION  N/A 4/27/2020    Procedure: Left Heart Cath;  Surgeon: Remi John MD;  Location: [facility] CATH INVASIVE LOCATION;  Service: Cardiovascular;  Laterality: N/A;   • CARDIAC CATHETERIZATION N/A 4/27/2020    Procedure: Coronary angiography;  Surgeon: Remi John MD;  Location: [facility] CATH INVASIVE LOCATION;  Service: Cardiovascular;  Laterality: N/A;   • CARDIAC CATHETERIZATION N/A 4/27/2020    Procedure: Left ventriculography;  Surgeon: Remi John MD;  Location: [facility] CATH INVASIVE LOCATION;  Service: Cardiovascular;  Laterality: N/A;   • ENDOSCOPY N/A 4/16/2020    Procedure: ESOPHAGOGASTRODUODENOSCOPY WITH COLD BIOPSIES;  Surgeon: Renetta Oden MD;  Location: [facility] ENDOSCOPY;  Service: Gastroenterology;  Laterality: N/A;  PRE- DYSPHAGIA, ABNORMAL CT  POST- ESOPHAGITIS WITH LARGE ESOPHAGEAL ULCER, HIATAL HERNIA, RETAINED FOOD   • ENDOSCOPY N/A 6/20/2020    Procedure: ESOPHAGOGASTRODUODENOSCOPY with biopsies;  Surgeon: Rosanna Ghosh MD;  Location: [facility] ENDOSCOPY;  Service: Gastroenterology;  Laterality: N/A;  pre- hx esophageal tear  post- esophadeal ring, hiatal hernia, gastritis, irregular z line, esophageal diverticumum   • UPPER GASTROINTESTINAL ENDOSCOPY  07/2020    esophageal tear     ONCOLOGIC/HEMATOLOGIC HISTORY: (History from previous dates can be found in the separate document.)    The patient is a 65 y.o. year old male who has history of prostate cancer post radiation therapy in [place] 2000, more than 15 years ago, and also history of anxiety/depression, and chronic dysphagia, who has complicated medical history this time since his admission on 04/16/2020. This patient presented to the emergency room because of dyspnea and he was found having bilateral pneumonia and respiratory failure. He was actually admitted to the Intensive Care Unit and was on ventilation support. He was tested negative for COVID-19 infection. Nevertheless he had CT scan examination on 04/16/2020  which reported circumferential thickening of the distal esophagus, and also bilateral pulmonary infiltration. The patient was seen by GI service and Juan Babcock MD, did EGD examination on 04/16/2020. This study reported ulceration and mucosal breaks involving at least 75% of the esophageal circumference with esophagitis but no recent bleeding. Biopsy was taken. Pathology evaluation reported no evidence of malignancy. There was moderate active inflammation with eosinophil. The patient had gastric tube placed due to his GI problem.     This patient seems also had stroke evident on brain MRI examination, at subacute infarction of the lower right cerebellum. There was also post infarct enhancement and evidence of small petechial hemorrhage.      This patient had bilateral venous duplex study in the lower extremities on 04/26/2020 and this study reported right leg acute venous thrombosis in the peroneal vein and left leg acute deep vein thrombosis involving the peroneal and gastrocnemius veins. All other veins were normal bilaterally.      The patient currently has been on Eliquis anticoagulation which I agree with.      The patient reports he had issue with dysphagia for quite a significant time and sometimes with food stuck. Most time he was able to cough it out or swallow it without further incident, however this time prior to the ER visit he really had food stuck and was kind of choking. He was brought up to the emergency room by ambulance.       I saw him as a consult during his hospitalization in April 2020 after a stroke that required ICU admission from 4/16/2020 to 4/30/2020.    Patient reports he is on Eliquis anticoagulation 5 mg twice a day, denies bleeding or bruising.  He is recovering from his stroke, he still has some imbalance month but improved.  He denies headaches vision changes.  His performance status is ECOG 1-0.    Patient reports he continues taking Protonix.  He denies melena  hematochezia.    He has no complaints at this time.     I reviewed the medical records, and found out that he had repeated EGD examination 6/20/2020 which reported evidence of esophagitis and gastritis.  This is confirmed by by pathology evaluation.      Laboratory studies, on 7/17/2020 reported normalized hemoglobin 14.7.  He has normal platelets 219,000 and WBC 6930 including ANC 4070 lymphocytes 1600.     I summarized the results of hypercoagulable work-up for him.  In April 2020 we obtain comprehensive laboratory work-up during his hospitalization.  He had a negative work-up.  He was tested negative for factor II mutation, factor V Leyden mutation, and a normal Antithrombin activity 103%, protein C activity 109% and protein S activity 95%.  He was tested negative for anticardiolipin antibodies, anti-beta-2 5.2  Antibodies and anti-phosphatidylserine antibodies in the negative for lupus anticoagulant.    Lab results in April 2020 reported ferritin 189, iron saturation 12%, free iron 28 TIBC 228 and vitamin B12 at 541 and RBC folate 1025 ng/mL on 4/22/2020.  Had elevated C-reactive protein 4.27 mg/dL.  His hemoglobin was 10.1, platelets 199,000 and WBC 7180 on the same day.  Serum folate was 8.4 ng/mL on 4/29/2020.      Laboratory studies on 7/23/2021 reported complete normal CBC including Hb 15.4, platelets 241,000 WBC 6470.     MEDICATIONS    Current Outpatient Medications:   •  apixaban (Eliquis) 5 MG tablet tablet, Take 1 tablet by mouth Every 12 (Twelve) Hours., Disp: 180 tablet, Rfl: 1  •  atorvastatin (LIPITOR) 40 MG tablet, TAKE 1 TABLET BY MOUTH EVERY DAY, Disp: 90 tablet, Rfl: 1  •  clomiPRAMINE (ANAFRANIL) 50 MG capsule, Take 3 capsules by mouth Every Night., Disp: 270 capsule, Rfl: 1  •  multivitamin with minerals tablet tablet, Take 1 tablet by mouth Daily., Disp: , Rfl:   •  pantoprazole (PROTONIX) 40 MG EC tablet, TAKE 1 TABLET BY MOUTH EVERY DAY BEFORE BREAKFAST, Disp: 90 tablet, Rfl: 1  •   "azelastine (ASTELIN) 0.1 % nasal spray, 2 sprays into the nostril(s) as directed by provider 2 (Two) Times a Day., Disp: 30 each, Rfl: 3  •  azithromycin (Zithromax) 250 MG tablet, Take 2 tablets the first day, then 1 tablet daily for 4 days., Disp: 6 tablet, Rfl: 0  •  cetirizine (zyrTEC) 10 MG tablet, Take 1 tablet by mouth Daily for 30 days., Disp: 30 tablet, Rfl: 0  •  montelukast (SINGULAIR) 10 MG tablet, Take 1 tablet by mouth Every Night for 30 days., Disp: 30 tablet, Rfl: 0  •  predniSONE (DELTASONE) 20 MG tablet, Take 1 tablet by mouth Daily., Disp: 7 tablet, Rfl: 0    ALLERGIES:   No Known Allergies    SOCIAL HISTORY:       Social History     Socioeconomic History   • Marital status:      Spouse name: Kelsey   • Number of children: 2   Tobacco Use   • Smoking status: Never   • Smokeless tobacco: Never   Vaping Use   • Vaping Use: Never used   Substance and Sexual Activity   • Alcohol use: Not Currently     Alcohol/week: 1.0 standard drink     Types: 1 Cans of beer per week     Comment: Per week   • Drug use: No   • Sexual activity: Not Currently     Partners: Female     Birth control/protection: Post-menopausal     FAMILY HISTORY:  Family History   Problem Relation Age of Onset   • Glaucoma Mother    • Alzheimer's disease Mother    • Hypertension Father    • Diabetes Father    • Liver cancer Father    • Nephrolithiasis Father    • Multiple myeloma Sister    • Other Brother         liver transplant   • Multiple myeloma Brother    • Liver cancer Brother    • Prostate cancer Brother    • Arthritis Other    • Diabetes Other    • Cancer Other    • Malig Hyperthermia Neg Hx           Vitals:    01/27/23 1454   BP: 149/89   Pulse: 89   Resp: 16   Temp: 97.1 °F (36.2 °C)   TempSrc: Temporal   SpO2: 100%   Weight: 85 kg (187 lb 6.4 oz)   Height: 182.9 cm (72.01\")   PainSc: 0-No pain     Current Status 1/27/2023   ECOG score 0      PHYSICAL EXAM:    GENERAL:  Well-developed, well-nourished male in no acute " distress.  Orientated to time place and the people.  SKIN:  Warm, dry without rashes, purpura or petechiae.  HEENT:  Normocephalic.  Wearing mask.   LYMPHATICS:  No cervical, supraclavicular adenopathy.  CHEST: Normal respiratory effort.  Lungs clear to auscultation. Good airflow.  CARDIAC:  Regular rate and rhythm without murmurs. Normal S1,S2.  ABDOMEN:  Soft, nontender with no organomegaly or masses.  Bowel sounds normal.  EXTREMITIES:  No clubbing, cyanosis or edema.  NEUROLOGICAL:  Grossly intact.  No focal neurological deficits.  PSYCHIATRIC:  Normal affect and mood.      RECENT LABS:  Lab Results   Component Value Date    WBC 6.03 01/27/2023    HGB 14.8 01/27/2023    HCT 43.9 01/27/2023    MCV 80.7 01/27/2023     01/27/2023     Lab Results   Component Value Date    NEUTROABS 3.21 01/27/2023     Lab Results   Component Value Date    GLUCOSE 115 (H) 11/04/2022    BUN 13 11/04/2022    CREATININE 1.15 11/04/2022    EGFRIFNONA 63 04/30/2021    EGFRIFAFRI 82 04/16/2019    BCR 11.3 11/04/2022    K 4.5 11/04/2022    CO2 28.3 11/04/2022    CALCIUM 10.1 11/04/2022    PROTENTOTREF 7.2 05/02/2022    ALBUMIN 4.10 11/04/2022    LABIL2 1.5 05/02/2022    AST 30 11/04/2022    ALT 32 11/04/2022       Assessment & Plan     ASSESSMENT:  1. Subacute stroke involving the right cerebellum, and also bilateral lower extremity calf vein thrombosis in April 2020.    · His brain MRI on 4/24/2020 also reported chronic small vascular ischemic disease.    · Hypercoagulable work-up in April 2020 was completely negative.    · Patient currently is on Eliquis anticoagulation since  04/27/2020 after initial Lovenox treatment.   · Venous Doppler study on 7/10/2020 reported resolution of bilateral lower extremity calf vein thrombosis.    · I discussed with the patient on 7/17/2020 about his laboratory work-up which is completely negative for hypercoagulable status in April 2020 when he suffered a significant stroke in the cerebellum.   Despite complete resolution of the calf vein thrombosis, I think his stroke in the cerebellum was significant.  He needs long-term anticoagulation.  I recommended to continue anticoagulation.    · Patient is examined 1/15/2021, tolerating Eliquis anticoagulation.  This will be continued.  · On 7/23/2021, patient reports good tolerance with no easy bleeding or bruising.  No new signs of stroke or DVT.  Continue Eliquis.  · On 1/14/2022, patient reports doing well, no recurrence of stroke, no evidence for DVT.  Tolerating Eliquis anticoagulation 5 mg twice a day.  This will be continued.  · On 7/25/2022 patient reports good tolerance to Eliquis anticoagulation.  No evidence for recurrent stroke or DVT.  Will recommend to continue Eliquis for lifetime.  · 1/27/2022: Continue Eliquis 5 mg twice daily.  Tolerating well without signs or symptoms of bleeding.  Reports being compliant with Eliquis.  We did again discuss recommendations to continue Eliquis lifelong.    2.  Anemia in April 2020 during his hospitalization.   · Laboratory studies showed appropriate ferritin level 189 and slightly low iron saturation 12% in April 2020.  Normal vitamin B12 level and folate level.   · Patient has normalized hemoglobin 14.7 on 7/17/2020.  · Continue to have normal hemoglobin 14.8 on 1/15/2021.  Continue to monitor due to risk of bleeding on anticoagulation.  · Outside lab reported hemoglobin 13.0 on 4/30/2021.    · Normal hemoglobin 15.4 on 7/23/2021.  Continue to monitor.  · On 1/14/2022 hemoglobin 14.5 MCV 87.3.  · Hemoglobin today 14.8.    3. Esophageal ulcer status post EGD on 04/16/2020, biopsy sample showed no evidence of malignancy.   · The patient was followed by GI service, started on Protonix.  · Repeated EGD examination 6/20/2020 reported esophagitis and gastritis.  No esophageal ulcer.    4.  COVID-19 vaccination.    · Patient reports in July 2021 that he is fully vaccinated against COVID-19.      PLAN:   1. Continue  Eliquis 5 mg twice a day.  Planning lifelong anticoagulation, this is agreed by neurology service.  Refill sent to pharmacy today.   2. Continue follow-up with cardiology service.  3. Continue Protonix, and follow-up with GI service.   4. Return in 6 months for CBC and MD.  5. Call/return sooner if needed.     RONNA Choe  01/27/23      CC:  MD Yuliya Alegria, RONNA.    MD Bakari Nelson MD / RONNA Garcia MD

## 2023-01-27 ENCOUNTER — LAB (OUTPATIENT)
Dept: LAB | Facility: HOSPITAL | Age: 68
End: 2023-01-27
Payer: MEDICARE

## 2023-01-27 ENCOUNTER — OFFICE VISIT (OUTPATIENT)
Dept: ONCOLOGY | Facility: CLINIC | Age: 68
End: 2023-01-27
Payer: MEDICARE

## 2023-01-27 VITALS
DIASTOLIC BLOOD PRESSURE: 89 MMHG | TEMPERATURE: 97.1 F | HEART RATE: 89 BPM | RESPIRATION RATE: 16 BRPM | HEIGHT: 72 IN | OXYGEN SATURATION: 100 % | SYSTOLIC BLOOD PRESSURE: 149 MMHG | BODY MASS INDEX: 25.38 KG/M2 | WEIGHT: 187.4 LBS

## 2023-01-27 DIAGNOSIS — Z86.718 HISTORY OF DEEP VEIN THROMBOSIS (DVT) OF LOWER EXTREMITY: ICD-10-CM

## 2023-01-27 DIAGNOSIS — I63.10 CEREBROVASCULAR ACCIDENT (CVA) DUE TO EMBOLISM OF PRECEREBRAL ARTERY: ICD-10-CM

## 2023-01-27 DIAGNOSIS — Z86.718 HISTORY OF DEEP VEIN THROMBOSIS (DVT) OF LOWER EXTREMITY: Primary | ICD-10-CM

## 2023-01-27 DIAGNOSIS — Z79.01 ANTICOAGULATION ADEQUATE: ICD-10-CM

## 2023-01-27 LAB
BASOPHILS # BLD AUTO: 0.09 10*3/MM3 (ref 0–0.2)
BASOPHILS NFR BLD AUTO: 1.5 % (ref 0–1.5)
DEPRECATED RDW RBC AUTO: 41.8 FL (ref 37–54)
EOSINOPHIL # BLD AUTO: 0.31 10*3/MM3 (ref 0–0.4)
EOSINOPHIL NFR BLD AUTO: 5.1 % (ref 0.3–6.2)
ERYTHROCYTE [DISTWIDTH] IN BLOOD BY AUTOMATED COUNT: 14.4 % (ref 12.3–15.4)
HCT VFR BLD AUTO: 43.9 % (ref 37.5–51)
HGB BLD-MCNC: 14.8 G/DL (ref 13–17.7)
IMM GRANULOCYTES # BLD AUTO: 0.07 10*3/MM3 (ref 0–0.05)
IMM GRANULOCYTES NFR BLD AUTO: 1.2 % (ref 0–0.5)
LYMPHOCYTES # BLD AUTO: 1.67 10*3/MM3 (ref 0.7–3.1)
LYMPHOCYTES NFR BLD AUTO: 27.7 % (ref 19.6–45.3)
MCH RBC QN AUTO: 27.2 PG (ref 26.6–33)
MCHC RBC AUTO-ENTMCNC: 33.7 G/DL (ref 31.5–35.7)
MCV RBC AUTO: 80.7 FL (ref 79–97)
MONOCYTES # BLD AUTO: 0.68 10*3/MM3 (ref 0.1–0.9)
MONOCYTES NFR BLD AUTO: 11.3 % (ref 5–12)
NEUTROPHILS NFR BLD AUTO: 3.21 10*3/MM3 (ref 1.7–7)
NEUTROPHILS NFR BLD AUTO: 53.2 % (ref 42.7–76)
NRBC BLD AUTO-RTO: 0 /100 WBC (ref 0–0.2)
PLATELET # BLD AUTO: 271 10*3/MM3 (ref 140–450)
PMV BLD AUTO: 9.9 FL (ref 6–12)
RBC # BLD AUTO: 5.44 10*6/MM3 (ref 4.14–5.8)
WBC NRBC COR # BLD: 6.03 10*3/MM3 (ref 3.4–10.8)

## 2023-01-27 PROCEDURE — 99213 OFFICE O/P EST LOW 20 MIN: CPT | Performed by: NURSE PRACTITIONER

## 2023-01-27 PROCEDURE — 36415 COLL VENOUS BLD VENIPUNCTURE: CPT

## 2023-01-27 PROCEDURE — 85025 COMPLETE CBC W/AUTO DIFF WBC: CPT

## 2023-03-24 RX ORDER — ATORVASTATIN CALCIUM 40 MG/1
TABLET, FILM COATED ORAL
Qty: 90 TABLET | Refills: 1 | Status: SHIPPED | OUTPATIENT
Start: 2023-03-24

## 2023-03-24 NOTE — TELEPHONE ENCOUNTER
Rx Refill Note  Requested Prescriptions     Pending Prescriptions Disp Refills   • atorvastatin (LIPITOR) 40 MG tablet [Pharmacy Med Name: ATORVASTATIN 40 MG TABLET] 90 tablet 1     Sig: TAKE 1 TABLET BY MOUTH EVERY DAY      Last office visit with prescribing clinician: 11/17/2022   Last telemedicine visit with prescribing clinician: Visit date not found   Next office visit with prescribing clinician: Visit date not found                         Would you like a call back once the refill request has been completed: [] Yes [] No    If the office needs to give you a call back, can they leave a voicemail: [] Yes [] No    Renetta Stoddard, PCT  03/24/23, 08:00 EDT

## 2023-04-13 ENCOUNTER — OFFICE VISIT (OUTPATIENT)
Dept: GASTROENTEROLOGY | Facility: CLINIC | Age: 68
End: 2023-04-13
Payer: MEDICARE

## 2023-04-13 ENCOUNTER — TELEPHONE (OUTPATIENT)
Dept: GASTROENTEROLOGY | Facility: CLINIC | Age: 68
End: 2023-04-13
Payer: MEDICARE

## 2023-04-13 VITALS
DIASTOLIC BLOOD PRESSURE: 82 MMHG | SYSTOLIC BLOOD PRESSURE: 127 MMHG | HEIGHT: 72 IN | TEMPERATURE: 96.2 F | HEART RATE: 98 BPM | BODY MASS INDEX: 25.5 KG/M2 | WEIGHT: 188.3 LBS

## 2023-04-13 DIAGNOSIS — R13.19 ESOPHAGEAL DYSPHAGIA: ICD-10-CM

## 2023-04-13 DIAGNOSIS — K20.0 ESOPHAGITIS, EOSINOPHILIC: Primary | Chronic | ICD-10-CM

## 2023-04-13 PROCEDURE — 1159F MED LIST DOCD IN RCRD: CPT | Performed by: INTERNAL MEDICINE

## 2023-04-13 PROCEDURE — 1160F RVW MEDS BY RX/DR IN RCRD: CPT | Performed by: INTERNAL MEDICINE

## 2023-04-13 PROCEDURE — 99214 OFFICE O/P EST MOD 30 MIN: CPT | Performed by: INTERNAL MEDICINE

## 2023-04-13 RX ORDER — SODIUM CHLORIDE, SODIUM LACTATE, POTASSIUM CHLORIDE, CALCIUM CHLORIDE 600; 310; 30; 20 MG/100ML; MG/100ML; MG/100ML; MG/100ML
30 INJECTION, SOLUTION INTRAVENOUS CONTINUOUS
OUTPATIENT
Start: 2023-06-19

## 2023-04-13 NOTE — PROGRESS NOTES
Chief Complaint   Patient presents with   • Esophagitis, eosinophilic       Subjective     HPI    Leonardo Youssef is a 68 y.o. male with a past medical history noted below who presents for follow-up of eosinophilic esophagitis, heartburn symptoms, history of esophageal tear.    He is overall doing well.  Very rare episodes of dysphagia.  No heartburn or reflux.  Takes pantoprazole regularly.  No changed in his weight.  Not avoiding any foods.      He will update his cologuard test soon, he has the test kit at home.     EGD June 20, 2020 showing changes consistent with eosinophilic esophagitis, widely patent Schatzki's ring, and gastritis.    Today's visit was in the office.  Both the patient and I were wearing face masks and proper hand hygiene was performed before and after the physical exam.     Past Medical History:   Diagnosis Date   • Anxiety    • Aspiration pneumonia    • Benign prostatic hyperplasia    • Depression    • DVT, bilateral lower limbs    • Dysphagia    • Falls     2 falls due to ( 1) balance  (2) tripped   • GERD (gastroesophageal reflux disease) 04/2020    Emergency Room Diagnosis   • HL (hearing loss)    • Hx of radiation therapy    • Hyperlipidemia April 2020   • Kidney stone    • Lightheadedness    • OCD (obsessive compulsive disorder)    • Prostate cancer     prostate   • Stroke (cerebrum) 05/2020   • Ulcer 04/2020    Emergency Room Diagnosis          Current Outpatient Medications:   •  apixaban (Eliquis) 5 MG tablet tablet, Take 1 tablet by mouth Every 12 (Twelve) Hours., Disp: 180 tablet, Rfl: 1  •  atorvastatin (LIPITOR) 40 MG tablet, TAKE 1 TABLET BY MOUTH EVERY DAY, Disp: 90 tablet, Rfl: 1  •  clomiPRAMINE (ANAFRANIL) 50 MG capsule, Take 3 capsules by mouth Every Night., Disp: 270 capsule, Rfl: 1  •  multivitamin with minerals tablet tablet, Take 1 tablet by mouth Daily., Disp: , Rfl:   •  pantoprazole (PROTONIX) 40 MG EC tablet, TAKE 1 TABLET BY MOUTH EVERY DAY BEFORE BREAKFAST, Disp: 90  tablet, Rfl: 1      Objective     Vitals:    04/13/23 0959   BP: 127/82   Pulse: 98   Temp: 96.2 °F (35.7 °C)         04/13/23 0959   Weight: 85.4 kg (188 lb 4.8 oz)     Body mass index is 25.54 kg/m².    Physical Exam        WBC   Date Value Ref Range Status   01/27/2023 6.03 3.40 - 10.80 10*3/mm3 Final   05/02/2022 7.1 3.4 - 10.8 x10E3/uL Final     RBC   Date Value Ref Range Status   01/27/2023 5.44 4.14 - 5.80 10*6/mm3 Final   05/02/2022 5.44 4.14 - 5.80 x10E6/uL Final     Hemoglobin   Date Value Ref Range Status   01/27/2023 14.8 13.0 - 17.7 g/dL Final     Hematocrit   Date Value Ref Range Status   01/27/2023 43.9 37.5 - 51.0 % Final     MCV   Date Value Ref Range Status   01/27/2023 80.7 79.0 - 97.0 fL Final     MCH   Date Value Ref Range Status   01/27/2023 27.2 26.6 - 33.0 pg Final     MCHC   Date Value Ref Range Status   01/27/2023 33.7 31.5 - 35.7 g/dL Final     RDW   Date Value Ref Range Status   01/27/2023 14.4 12.3 - 15.4 % Final     RDW-SD   Date Value Ref Range Status   01/27/2023 41.8 37.0 - 54.0 fl Final     MPV   Date Value Ref Range Status   01/27/2023 9.9 6.0 - 12.0 fL Final     Platelets   Date Value Ref Range Status   01/27/2023 271 140 - 450 10*3/mm3 Final     Neutrophil %   Date Value Ref Range Status   01/27/2023 53.2 42.7 - 76.0 % Final     Lymphocyte %   Date Value Ref Range Status   01/27/2023 27.7 19.6 - 45.3 % Final     Monocyte %   Date Value Ref Range Status   01/27/2023 11.3 5.0 - 12.0 % Final     Eosinophil %   Date Value Ref Range Status   01/27/2023 5.1 0.3 - 6.2 % Final     Basophil %   Date Value Ref Range Status   01/27/2023 1.5 0.0 - 1.5 % Final     Immature Grans %   Date Value Ref Range Status   01/27/2023 1.2 (H) 0.0 - 0.5 % Final     Neutrophils, Absolute   Date Value Ref Range Status   01/27/2023 3.21 1.70 - 7.00 10*3/mm3 Final     Lymphocytes, Absolute   Date Value Ref Range Status   01/27/2023 1.67 0.70 - 3.10 10*3/mm3 Final     Monocytes, Absolute   Date Value Ref  Range Status   01/27/2023 0.68 0.10 - 0.90 10*3/mm3 Final     Eosinophils, Absolute   Date Value Ref Range Status   01/27/2023 0.31 0.00 - 0.40 10*3/mm3 Final     Basophils, Absolute   Date Value Ref Range Status   01/27/2023 0.09 0.00 - 0.20 10*3/mm3 Final     Immature Grans, Absolute   Date Value Ref Range Status   01/27/2023 0.07 (H) 0.00 - 0.05 10*3/mm3 Final     nRBC   Date Value Ref Range Status   01/27/2023 0.0 0.0 - 0.2 /100 WBC Final       Lab Results   Component Value Date    GLUCOSE 115 (H) 11/04/2022    BUN 13 11/04/2022    CREATININE 1.15 11/04/2022    EGFRIFNONA 63 04/30/2021    EGFRIFAFRI 82 04/16/2019    BCR 11.3 11/04/2022    CO2 28.3 11/04/2022    CALCIUM 10.1 11/04/2022    PROTENTOTREF 7.2 05/02/2022    ALBUMIN 4.10 11/04/2022    LABIL2 1.5 05/02/2022    AST 30 11/04/2022    ALT 32 11/04/2022         Imaging Results (Last 7 Days)     ** No results found for the last 168 hours. **          I personally reviewed data as detailed below:     The labs listed above.    Office notes from: 11/17/22 pcp    Assessment and Plan    1. Esophagitis, eosinophilic : stable on PPI    2. Esophageal dysphagia: much improved    3. Colon cancer screening    Plan  Continue PPI  We will go ahead and update his EGD to confirm reduction in eosinophilia in his esophagus with PPI use  I we will get permission from his hematologist regarding holding anticoagulation for 48 hours prior to procedures  I have encouraged him to do his Cologuard test prior to his EGD in case this returns as positive; he could have his colonoscopy done at the same time as his EGD       Diagnoses and all orders for this visit:    1. Esophagitis, eosinophilic (Primary)  -     Case Request; Standing  -     Follow Anesthesia Guidelines / Protocol; Future  -     Obtain Informed Consent; Future  -     Implement Anesthesia Orders Day of Procedure; Standing  -     Obtain Informed Consent; Standing  -     lactated ringers infusion  -     Case Request    2.  Esophageal dysphagia  -     Case Request; Standing  -     Follow Anesthesia Guidelines / Protocol; Future  -     Obtain Informed Consent; Future  -     Implement Anesthesia Orders Day of Procedure; Standing  -     Obtain Informed Consent; Standing  -     lactated ringers infusion  -     Case Request          I have discussed the above plan with the patient.  They verbalize understanding and are in agreement with the plan.  They have been advised to contact the office for any questions, concerns, or changes related to their health.    Dictated utilizing Dragon dictation

## 2023-04-13 NOTE — TELEPHONE ENCOUNTER
Message to DR Streeter requesting auth to hold Eliquis for 48 hours prior to EGD scheduled for 6/19.

## 2023-04-13 NOTE — TELEPHONE ENCOUNTER
----- Message from Rosanna Ghosh MD sent at 4/13/2023  1:09 PM EDT -----  Can you please reach out to his hematologist regarding holding his Eliquis for 48 hours prior to his EGD and let him know, thanks

## 2023-04-14 ENCOUNTER — TELEPHONE (OUTPATIENT)
Dept: GASTROENTEROLOGY | Facility: CLINIC | Age: 68
End: 2023-04-14
Payer: MEDICARE

## 2023-04-14 DIAGNOSIS — Z12.11 ENCOUNTER FOR SCREENING FOR MALIGNANT NEOPLASM OF COLON: Primary | ICD-10-CM

## 2023-04-14 NOTE — TELEPHONE ENCOUNTER
Caller: Leonardo Youssef    Relationship to patient: Self    Best call back number: [phone]    Type of visit: EGD    Requested date: WEEK OF July 17    If rescheduling, when is the original appointment: 6/19/23    Additional notes: DR. ESCOBEDO WANTED PATIENT TO GET A COLOGUARD TEST. HE WILL NEED A PRESCRIPTION FOR THAT. PLEASE CONTACT PATIENT TO ADVISE.

## 2023-04-18 ENCOUNTER — TELEPHONE (OUTPATIENT)
Dept: GASTROENTEROLOGY | Facility: CLINIC | Age: 68
End: 2023-04-18
Payer: MEDICARE

## 2023-04-18 NOTE — TELEPHONE ENCOUNTER
Caller:WALDO DE LUNA      Relationship to patient: SELF     Best call back number: [phone]    Patient is needing: TO RESCHEDULE EGD FOR July AND PT IS WAITING ON A CALL BACK

## 2023-04-18 NOTE — TELEPHONE ENCOUNTER
vm left for pt to fu on message from Dr Streeter.    Ok for hub to verify pt got my and Dr Streeters vm that he can hold his eliquis for 48h prior to his scope.

## 2023-04-20 NOTE — TELEPHONE ENCOUNTER
Called pt and advised of Dr Streeter's note.  Verb understanding.     Pt states that he needs to reschedule his procdure.   Advised will send message to Ashanti .

## 2023-06-03 DIAGNOSIS — F42.2 MIXED OBSESSIONAL THOUGHTS AND ACTS: ICD-10-CM

## 2023-06-03 DIAGNOSIS — F32.A ANXIETY AND DEPRESSION: ICD-10-CM

## 2023-06-03 DIAGNOSIS — F41.9 ANXIETY AND DEPRESSION: ICD-10-CM

## 2023-06-05 NOTE — TELEPHONE ENCOUNTER
Rx Refill Note  Requested Prescriptions     Pending Prescriptions Disp Refills    clomiPRAMINE (ANAFRANIL) 50 MG capsule 270 capsule 1     Sig: Take 3 capsules by mouth Every Night.      Last office visit with prescribing clinician: 11/17/2022   Last telemedicine visit with prescribing clinician: Visit date not found   Next office visit with prescribing clinician: Visit date not found                         Would you like a call back once the refill request has been completed: [] Yes [] No    If the office needs to give you a call back, can they leave a voicemail: [] Yes [] No    [employer]  06/05/23, 15:18 EDT

## 2023-06-07 RX ORDER — CLOMIPRAMINE HYDROCHLORIDE 50 MG/1
150 CAPSULE ORAL NIGHTLY
Qty: 270 CAPSULE | Refills: 1 | Status: SHIPPED | OUTPATIENT
Start: 2023-06-07

## 2023-06-12 ENCOUNTER — TELEPHONE (OUTPATIENT)
Dept: GASTROENTEROLOGY | Facility: CLINIC | Age: 68
End: 2023-06-12
Payer: MEDICARE

## 2023-06-19 ENCOUNTER — ANESTHESIA EVENT (OUTPATIENT)
Dept: GASTROENTEROLOGY | Facility: HOSPITAL | Age: 68
End: 2023-06-19
Payer: MEDICARE

## 2023-06-19 ENCOUNTER — ANESTHESIA (OUTPATIENT)
Dept: GASTROENTEROLOGY | Facility: HOSPITAL | Age: 68
End: 2023-06-19
Payer: MEDICARE

## 2023-06-19 PROCEDURE — 25010000002 PROPOFOL 10 MG/ML EMULSION: Performed by: STUDENT IN AN ORGANIZED HEALTH CARE EDUCATION/TRAINING PROGRAM

## 2023-06-19 RX ORDER — PROPOFOL 10 MG/ML
VIAL (ML) INTRAVENOUS AS NEEDED
Status: DISCONTINUED | OUTPATIENT
Start: 2023-06-19 | End: 2023-06-19 | Stop reason: SURG

## 2023-06-19 RX ORDER — PROPOFOL 10 MG/ML
VIAL (ML) INTRAVENOUS CONTINUOUS PRN
Status: DISCONTINUED | OUTPATIENT
Start: 2023-06-19 | End: 2023-06-19 | Stop reason: SURG

## 2023-06-19 RX ORDER — LIDOCAINE HYDROCHLORIDE 20 MG/ML
INJECTION, SOLUTION INFILTRATION; PERINEURAL AS NEEDED
Status: DISCONTINUED | OUTPATIENT
Start: 2023-06-19 | End: 2023-06-19 | Stop reason: SURG

## 2023-06-19 RX ADMIN — LIDOCAINE HYDROCHLORIDE 40 MG: 20 INJECTION, SOLUTION INFILTRATION; PERINEURAL at 11:49

## 2023-06-19 RX ADMIN — Medication 160 MCG/KG/MIN: at 11:49

## 2023-06-19 RX ADMIN — PROPOFOL 130 MG: 10 INJECTION, EMULSION INTRAVENOUS at 11:49

## 2023-06-19 NOTE — ANESTHESIA PREPROCEDURE EVALUATION
Anesthesia Evaluation     Patient summary reviewed and Nursing notes reviewed   history of anesthetic complications:               Airway   Mallampati: II  TM distance: >3 FB  Neck ROM: full  Dental - normal exam     Pulmonary    (-) pneumonia  Cardiovascular     ECG reviewed    (+) DVT resolved, hyperlipidemia,       Neuro/Psych  (+) CVA, psychiatric history Depression and Anxiety,    GI/Hepatic/Renal/Endo    (+)  GERD,  renal disease stones,     Musculoskeletal     Abdominal    Substance History      OB/GYN          Other      history of cancer      Other Comment: Hx prostate CA                    Anesthesia Plan    ASA 3     MAC   total IV anesthesia  (I have reviewed the patient's history with the patient and the chart, including all pertinent laboratory results and imaging. I have explained the risks of anesthesia including but not limited to dental damage, corneal abrasion, nerve injury, MI, stroke, and death. Questions asked and answered. Anesthetic plan discussed with patient and team as indicated. Patient expressed understanding of the above.  )    Anesthetic plan, risks, benefits, and alternatives have been provided, discussed and informed consent has been obtained with: patient.

## 2023-06-19 NOTE — ANESTHESIA POSTPROCEDURE EVALUATION
"Patient: Leonardo Youssef    Procedure Summary     Date: 06/19/23 Room / Location:  [facility]    Anesthesia Start: 1144 Anesthesia Stop: 1210    Procedure: ESOPHAGOGASTRODUODENOSCOPY with bx (Esophagus) Diagnosis:       Esophagitis, eosinophilic      Esophageal dysphagia      (Esophagitis, eosinophilic [K20.0])      (Esophageal dysphagia [R13.19])    Surgeons: Rosanna Ghosh MD Provider: Kris Hicks MD    Anesthesia Type: MAC ASA Status: 3          Anesthesia Type: MAC    Vitals  Vitals Value Taken Time   /87 06/19/23 1208   Temp     Pulse 71 06/19/23 1208   Resp 13 06/19/23 1208   SpO2 99 % 06/19/23 1208           Post Anesthesia Care and Evaluation    Patient location during evaluation: PACU  Patient participation: complete - patient participated  Level of consciousness: awake and alert  Pain management: adequate    Airway patency: patent  Anesthetic complications: No anesthetic complications  PONV Status: controlled  Cardiovascular status: acceptable and hemodynamically stable  Respiratory status: acceptable  Hydration status: acceptable    Comments: /87 (BP Location: Left arm, Patient Position: Lying)   Pulse 71   Resp 13   Ht 182.9 cm (72\")   Wt 85.7 kg (189 lb)   SpO2 99%   BMI 25.63 kg/m²       "

## 2023-07-20 ENCOUNTER — LAB (OUTPATIENT)
Dept: OTHER | Facility: HOSPITAL | Age: 68
End: 2023-07-20
Payer: MEDICARE

## 2023-07-20 ENCOUNTER — OFFICE VISIT (OUTPATIENT)
Dept: ONCOLOGY | Facility: CLINIC | Age: 68
End: 2023-07-20
Payer: MEDICARE

## 2023-07-20 VITALS
BODY MASS INDEX: 25.35 KG/M2 | DIASTOLIC BLOOD PRESSURE: 97 MMHG | SYSTOLIC BLOOD PRESSURE: 149 MMHG | HEART RATE: 94 BPM | TEMPERATURE: 97.8 F | OXYGEN SATURATION: 98 % | HEIGHT: 72 IN | RESPIRATION RATE: 18 BRPM | WEIGHT: 187.2 LBS

## 2023-07-20 DIAGNOSIS — Z86.718 HISTORY OF DEEP VEIN THROMBOSIS (DVT) OF LOWER EXTREMITY: ICD-10-CM

## 2023-07-20 DIAGNOSIS — I63.10 CEREBROVASCULAR ACCIDENT (CVA) DUE TO EMBOLISM OF PRECEREBRAL ARTERY: Primary | ICD-10-CM

## 2023-07-20 DIAGNOSIS — Z79.01 ANTICOAGULATION ADEQUATE: ICD-10-CM

## 2023-07-20 LAB
ALBUMIN SERPL-MCNC: 4 G/DL (ref 3.5–5.2)
ALBUMIN/GLOB SERPL: 1.2 G/DL
ALP SERPL-CCNC: 64 U/L (ref 39–117)
ALT SERPL W P-5'-P-CCNC: 23 U/L (ref 1–41)
ANION GAP SERPL CALCULATED.3IONS-SCNC: 8.2 MMOL/L (ref 5–15)
AST SERPL-CCNC: 27 U/L (ref 1–40)
BASOPHILS # BLD AUTO: 0.09 10*3/MM3 (ref 0–0.2)
BASOPHILS NFR BLD AUTO: 1.5 % (ref 0–1.5)
BILIRUB SERPL-MCNC: 0.6 MG/DL (ref 0–1.2)
BUN SERPL-MCNC: 18 MG/DL (ref 8–23)
BUN/CREAT SERPL: 15.5 (ref 7–25)
CALCIUM SPEC-SCNC: 10.1 MG/DL (ref 8.6–10.5)
CHLORIDE SERPL-SCNC: 101 MMOL/L (ref 98–107)
CO2 SERPL-SCNC: 28.8 MMOL/L (ref 22–29)
CREAT SERPL-MCNC: 1.16 MG/DL (ref 0.76–1.27)
DEPRECATED RDW RBC AUTO: 46.5 FL (ref 37–54)
EGFRCR SERPLBLD CKD-EPI 2021: 67.8 ML/MIN/1.73
EOSINOPHIL # BLD AUTO: 0.22 10*3/MM3 (ref 0–0.4)
EOSINOPHIL NFR BLD AUTO: 3.6 % (ref 0.3–6.2)
ERYTHROCYTE [DISTWIDTH] IN BLOOD BY AUTOMATED COUNT: 15.9 % (ref 12.3–15.4)
GLOBULIN UR ELPH-MCNC: 3.4 GM/DL
GLUCOSE SERPL-MCNC: 79 MG/DL (ref 65–99)
HCT VFR BLD AUTO: 43.2 % (ref 37.5–51)
HGB BLD-MCNC: 14.3 G/DL (ref 13–17.7)
IMM GRANULOCYTES # BLD AUTO: 0.01 10*3/MM3 (ref 0–0.05)
IMM GRANULOCYTES NFR BLD AUTO: 0.2 % (ref 0–0.5)
LYMPHOCYTES # BLD AUTO: 1.64 10*3/MM3 (ref 0.7–3.1)
LYMPHOCYTES NFR BLD AUTO: 26.5 % (ref 19.6–45.3)
MCH RBC QN AUTO: 26.8 PG (ref 26.6–33)
MCHC RBC AUTO-ENTMCNC: 33.1 G/DL (ref 31.5–35.7)
MCV RBC AUTO: 81.1 FL (ref 79–97)
MONOCYTES # BLD AUTO: 0.62 10*3/MM3 (ref 0.1–0.9)
MONOCYTES NFR BLD AUTO: 10 % (ref 5–12)
NEUTROPHILS NFR BLD AUTO: 3.6 10*3/MM3 (ref 1.7–7)
NEUTROPHILS NFR BLD AUTO: 58.2 % (ref 42.7–76)
NRBC BLD AUTO-RTO: 0 /100 WBC (ref 0–0.2)
PLATELET # BLD AUTO: 252 10*3/MM3 (ref 140–450)
PMV BLD AUTO: 9.6 FL (ref 6–12)
POTASSIUM SERPL-SCNC: 4.4 MMOL/L (ref 3.5–5.2)
PROT SERPL-MCNC: 7.4 G/DL (ref 6–8.5)
RBC # BLD AUTO: 5.33 10*6/MM3 (ref 4.14–5.8)
SODIUM SERPL-SCNC: 138 MMOL/L (ref 136–145)
WBC NRBC COR # BLD: 6.18 10*3/MM3 (ref 3.4–10.8)

## 2023-07-20 PROCEDURE — 36415 COLL VENOUS BLD VENIPUNCTURE: CPT

## 2023-07-20 PROCEDURE — 80053 COMPREHEN METABOLIC PANEL: CPT | Performed by: INTERNAL MEDICINE

## 2023-07-20 PROCEDURE — 85025 COMPLETE CBC W/AUTO DIFF WBC: CPT | Performed by: INTERNAL MEDICINE

## 2023-07-20 NOTE — PROGRESS NOTES
.     REASON FOR FOLLOW-UP:     1. Subacute stroke involving the right cerebellum, and also bilateral lower extremity calf vein thrombosis in April 2020.    Hypercoagulable work-up was completely negative.    Patient is on Eliquis anticoagulation since 04/27/2020.   Venous Doppler study on 7/10/2020 reported resolution of bilateral lower extremity calf vein thrombosis.    2.  Anemia Hb 8.2 in April 2020 during his hospitalization.    Laboratory studies showed appropriate ferritin level 189 and slightly low iron saturation 12%.  Normal vitamin B12 level and folate level.  Hemoglobin was improving without transfusion.  Anemia resolved on 7/17/2020 with Hb 14.7.    HISTORY OF PRESENT ILLNESS: Patient is a 68 y.o. male with hyperlipidemia, gastroesophageal flux disease, and history of stroke, who presented today on 07/20/2023 for a 6-month follow-up evaluation.    The patient reports he has been tolerating Eliquis anticoagulation with no easy bleeding or bruising. He also has no leg edema. No chest pain or dyspnea.  No evidence for recurrent stroke.    The patient reports he will have a dental cleaning in early 08/2023 and his dentist wants him to discontinue Eliquis prior to the procedure.    Laboratory studies today on 7/20/2023 showed normal hemoglobin 14.3, platelets 252,000, WBC 6,180 including neutrophils 3600, and lymphocytes 1640.      Past Medical History:   Diagnosis Date    Anxiety     Aspiration pneumonia     Benign prostatic hyperplasia     Depression     DVT, bilateral lower limbs     Dysphagia     Falls     2 falls due to ( 1) balance  (2) tripped    GERD (gastroesophageal reflux disease) 04/2020    Emergency Room Diagnosis    HL (hearing loss)     Hx of radiation therapy     Hyperlipidemia April 2020    Kidney stone     Lightheadedness     OCD (obsessive compulsive disorder)     Prostate cancer     prostate    Stroke (cerebrum) 05/2020    Ulcer 04/2020    Emergency Room Diagnosis   Respiratory  failure, required ventilation support in April 2020.  Coronary artery disease/ischemic disease with cardiac catheterization on 4/27/2020.     Past Surgical History:   Procedure Laterality Date    CARDIAC CATHETERIZATION N/A 04/27/2020    Procedure: Left Heart Cath;  Surgeon: Remi John MD;  Location: [facility] CATH INVASIVE LOCATION;  Service: Cardiovascular;  Laterality: N/A;    CARDIAC CATHETERIZATION N/A 04/27/2020    Procedure: Coronary angiography;  Surgeon: Remi John MD;  Location: [facility] CATH INVASIVE LOCATION;  Service: Cardiovascular;  Laterality: N/A;    CARDIAC CATHETERIZATION N/A 04/27/2020    Procedure: Left ventriculography;  Surgeon: Remi John MD;  Location[facility] CATH [facility]VASIVE LOC[facility];  Service: Cardiovascular;  Laterality: N/A;    ENDOSCOPY N/A 04/16/2020    Procedure: ESOPHAGOGASTRODUODENOSCOPY WITH COLD BIOPSIES;  Surgeon: Renetta Oden MD;  Location: [facility] ENDOSCOPY;  Service: Gastroenterology;  Laterality: N/A;  PRE- DYSPHAGIA, ABNORMAL CT  POST- ESOPHAGITIS WITH LARGE ESOPHAGEAL ULCER, HIATAL HERNIA, RETAINED FOOD    ENDOSCOPY N/A 06/20/2020    Procedure: ESOPHAGOGASTRODUODENOSCOPY with biopsies;  Surgeon: Rosanna Ghosh MD;  Location: [facility] ENDOSCOPY;  Service: Gastroenterology;  Laterality: N/A;  pre- hx esophageal tear  post- esophadeal ring, hiatal hernia, gastritis, irregular z line, esophageal diverticumum    ENDOSCOPY N/A 6/19/2023    Procedure: ESOPHAGOGASTRODUODENOSCOPY with bx;  Surgeon: Rosanna Ghosh MD;  Location: [facility] ENDOSCOPY;  Service: Gastroenterology;  Laterality: N/A;  pre: hx EOE, dysphagia  post:hiatal hernia, gastritis    INSERTION PROSTATE RADIATION SEED      radiation for prostate ca - pt unsure if it was seeds or not    UPPER GASTROINTESTINAL ENDOSCOPY  07/2020    esophageal tear     ONCOLOGIC/HEMATOLOGIC HISTORY: (History from previous dates can be found in the separate document.)    The patient is a 65 y.o. year old male  who has history of prostate cancer post radiation therapy in [place] 2000, more than 15 years ago, and also history of anxiety/depression, and chronic dysphagia, who has complicated medical history this time since his admission on 04/16/2020. This patient presented to the emergency room because of dyspnea and he was found having bilateral pneumonia and respiratory failure. He was actually admitted to the Intensive Care Unit and was on ventilation support. He was tested negative for COVID-19 infection. Nevertheless he had CT scan examination on 04/16/2020 which reported circumferential thickening of the distal esophagus, and also bilateral pulmonary infiltration. The patient was seen by GI service and Juan Babcock MD, did EGD examination on 04/16/2020. This study reported ulceration and mucosal breaks involving at least 75% of the esophageal circumference with esophagitis but no recent bleeding. Biopsy was taken. Pathology evaluation reported no evidence of malignancy. There was moderate active inflammation with eosinophil. The patient had gastric tube placed due to his GI problem.     This patient seems also had stroke evident on brain MRI examination, at subacute infarction of the lower right cerebellum. There was also post infarct enhancement and evidence of small petechial hemorrhage.      This patient had bilateral venous duplex study in the lower extremities on 04/26/2020 and this study reported right leg acute venous thrombosis in the peroneal vein and left leg acute deep vein thrombosis involving the peroneal and gastrocnemius veins. All other veins were normal bilaterally.      The patient currently has been on Eliquis anticoagulation which I agree with.      The patient reports he had issue with dysphagia for quite a significant time and sometimes with food stuck. Most time he was able to cough it out or swallow it without further incident, however this time prior to the ER visit he really had food  stuck and was kind of choking. He was brought up to the emergency room by ambulance.       I saw him as a consult during his hospitalization in April 2020 after a stroke that required ICU admission from 4/16/2020 to 4/30/2020.    Patient reports he is on Eliquis anticoagulation 5 mg twice a day, denies bleeding or bruising.  He is recovering from his stroke, he still has some imbalance month but improved.  He denies headaches vision changes.  His performance status is ECOG 1-0.    Patient reports he continues taking Protonix.  He denies melena hematochezia.    He has no complaints at this time.     I reviewed the medical records, and found out that he had repeated EGD examination 6/20/2020 which reported evidence of esophagitis and gastritis.  This is confirmed by by pathology evaluation.      Laboratory studies, on 7/17/2020 reported normalized hemoglobin 14.7.  He has normal platelets 219,000 and WBC 6930 including ANC 4070 lymphocytes 1600.     I summarized the results of hypercoagulable work-up for him.  In April 2020 we obtain comprehensive laboratory work-up during his hospitalization.  He had a negative work-up.  He was tested negative for factor II mutation, factor V Leyden mutation, and a normal Antithrombin activity 103%, protein C activity 109% and protein S activity 95%.  He was tested negative for anticardiolipin antibodies, anti-beta-2 5.2  Antibodies and anti-phosphatidylserine antibodies in the negative for lupus anticoagulant.    Lab results in April 2020 reported ferritin 189, iron saturation 12%, free iron 28 TIBC 228 and vitamin B12 at 541 and RBC folate 1025 ng/mL on 4/22/2020.  Had elevated C-reactive protein 4.27 mg/dL.  His hemoglobin was 10.1, platelets 199,000 and WBC 7180 on the same day.  Serum folate was 8.4 ng/mL on 4/29/2020.      Laboratory studies on 7/23/2021 reported complete normal CBC including Hb 15.4, platelets 241,000 WBC 6470.     MEDICATIONS    Current Outpatient  "Medications:     apixaban (Eliquis) 5 MG tablet tablet, Take 1 tablet by mouth Every 12 (Twelve) Hours., Disp: 180 tablet, Rfl: 1    atorvastatin (LIPITOR) 40 MG tablet, TAKE 1 TABLET BY MOUTH EVERY DAY, Disp: 90 tablet, Rfl: 1    clomiPRAMINE (ANAFRANIL) 50 MG capsule, Take 3 capsules by mouth Every Night., Disp: 270 capsule, Rfl: 1    multivitamin with minerals tablet tablet, Take 1 tablet by mouth Daily., Disp: , Rfl:     pantoprazole (PROTONIX) 40 MG EC tablet, TAKE 1 TABLET BY MOUTH EVERY DAY BEFORE BREAKFAST, Disp: 90 tablet, Rfl: 3    ALLERGIES:   No Known Allergies      SOCIAL HISTORY:       Social History     Socioeconomic History    Marital status:      Spouse name: Kelsey    Number of children: 2   Tobacco Use    Smoking status: Never    Smokeless tobacco: Never   Vaping Use    Vaping Use: Never used   Substance and Sexual Activity    Alcohol use: Not Currently     Alcohol/week: 1.0 standard drink     Types: 1 Cans of beer per week    Drug use: No    Sexual activity: Defer     Partners: Female     FAMILY HISTORY:  Family History   Problem Relation Age of Onset    Glaucoma Mother     Alzheimer's disease Mother     Hypertension Father     Diabetes Father     Liver cancer Father     Nephrolithiasis Father     Multiple myeloma Sister     Other Brother         liver transplant    Multiple myeloma Brother     Liver cancer Brother     Prostate cancer Brother     Arthritis Other     Diabetes Other     Cancer Other     Malig Hyperthermia Neg Hx           Vitals:    07/20/23 1402   BP: 149/97   Pulse: 94   Resp: 18   Temp: 97.8 øF (36.6 øC)   TempSrc: Temporal   SpO2: 98%   Weight: 84.9 kg (187 lb 3.2 oz)   Height: 182.9 cm (72.01\")   PainSc: 0-No pain         7/20/2023     2:07 PM   Current Status   ECOG score 0        PHYSICAL EXAM:    GENERAL:  Well-developed, well-nourished male in no acute distress.  Orientated to time place and the people.  SKIN:  Warm, dry without rashes, purpura or petechiae.  HEENT:  " Normocephalic.   LYMPHATICS:  No cervical, supraclavicular adenopathy.  CHEST: Normal respiratory effort.  Lungs clear to auscultation. Good airflow.  CARDIAC:  Regular rate and rhythm without murmurs. Normal S1,S2.  ABDOMEN:  Soft, nontender with no organomegaly or masses.  Bowel sounds normal.  EXTREMITIES:  No lower extremity edema.    RECENT LABS:    Lab Results   Component Value Date    WBC 6.18 07/20/2023    HGB 14.3 07/20/2023    HCT 43.2 07/20/2023    MCV 81.1 07/20/2023     07/20/2023     Lab Results   Component Value Date    NEUTROABS 3.60 07/20/2023     Lab Results   Component Value Date    GLUCOSE 79 07/20/2023    BUN 18 07/20/2023    CREATININE 1.16 07/20/2023    EGFRIFNONA 63 04/30/2021    EGFRIFAFRI 82 04/16/2019    BCR 15.5 07/20/2023    K 4.4 07/20/2023    CO2 28.8 07/20/2023    CALCIUM 10.1 07/20/2023    PROTENTOTREF 7.2 05/02/2022    ALBUMIN 4.0 07/20/2023    LABIL2 1.5 05/02/2022    AST 27 07/20/2023    ALT 23 07/20/2023       Assessment & Plan     ASSESSMENT:  1. Subacute stroke involving the right cerebellum, and also bilateral lower extremity calf vein thrombosis in April 2020.    His brain MRI on 4/24/2020 also reported chronic small vascular ischemic disease.    Hypercoagulable work-up in April 2020 was completely negative.    Patient currently is on Eliquis anticoagulation since  04/27/2020 after initial Lovenox treatment.   Venous Doppler study on 7/10/2020 reported resolution of bilateral lower extremity calf vein thrombosis.    I discussed with the patient on 7/17/2020 about his laboratory work-up which is completely negative for hypercoagulable status in April 2020 when he suffered a significant stroke in the cerebellum.  Despite complete resolution of the calf vein thrombosis, I think his stroke in the cerebellum was significant.  He needs long-term anticoagulation.  I recommended to continue anticoagulation.    Patient is examined 1/15/2021, tolerating Eliquis anticoagulation.   This will be continued.  On 7/23/2021, patient reports good tolerance with no easy bleeding or bruising.  No new signs of stroke or DVT.  Continue Eliquis.  On 1/14/2022, patient reports doing well, no recurrence of stroke, no evidence for DVT.  Tolerating Eliquis anticoagulation 5 mg twice a day.  This will be continued.  On 7/25/2022 patient reports good tolerance to Eliquis anticoagulation.  No evidence for recurrent stroke or DVT.  Will recommend to continue Eliquis for lifetime.  1/27/2023: Continue Eliquis 5 mg twice daily.  Tolerating well without signs or symptoms of bleeding.  Reports being compliant with Eliquis.  We did again discuss recommendations to continue Eliquis lifelong.  Lab study on 07/20/2023 reported normal CBC and CMP., patient reports good tolerance to Eliquis without bleeding or bruising. Patient will continue Eliquis anticoagulation.    2.  Anemia in April 2020 during his hospitalization.   Laboratory studies showed appropriate ferritin level 189 and slightly low iron saturation 12% in April 2020.  Normal vitamin B12 level and folate level.   Patient has normalized hemoglobin 14.7 on 7/17/2020.  Continue to have normal hemoglobin 14.8 on 1/15/2021.  Continue to monitor due to risk of bleeding on anticoagulation.  Outside lab reported hemoglobin 13.0 on 4/30/2021.    Normal hemoglobin 15.4 on 7/23/2021.  Continue to monitor.  On 1/14/2022 hemoglobin 14.5 MCV 87.3.  Hemoglobin 14.8 on 1/27/2023.  On 7/20/2023, hemoglobin is 14.3. Patient reports no melena, hematochezia, or other bleeding.    3. Esophageal ulcer status post EGD on 04/16/2020, biopsy sample showed no evidence of malignancy.   The patient was followed by GI service, started on Protonix.  Repeated EGD examination 6/20/2020 reported esophagitis and gastritis.  No esophageal ulcer.  Today patient has no specific complaint.     4.  COVID-19 vaccination.    Patient reports in July 2021 that he is fully vaccinated against COVID-19.         PLAN:   Continue Eliquis 5 mg twice a day.  Planning lifelong anticoagulation, this is agreed by neurology service.  I E-scribed a new prescription to his pharmacy for a 6-month supply.  Patient will hold Eliquis for 48 hours prior to dental cleaning. He can resume Eliquis the same day as the dental cleaning.  Patient will come back for evaluation in 6 months and 12 months for CBC monitoring.  Call/return sooner if needed.    I discussed with the patient about laboratory results and further management plan.  Patient voiced understanding and agreeable.       Cezar Streeter MD PhD        CC:  MD Yuliya Alegria APRN.    MD Bakari Nelson MD / RONNA Garcia MD      Transcribed from ambient dictation for Cezar Streeter MD PhD by Xochitl Toro.  07/20/23   14:42 EDT    Patient or patient representative verbalized consent to the visit recording.  I have personally performed the services described in this document as transcribed by the above individual, and it is both accurate and complete.        Cezar Streeter MD PhD

## 2023-08-01 ENCOUNTER — OFFICE VISIT (OUTPATIENT)
Dept: FAMILY MEDICINE CLINIC | Facility: CLINIC | Age: 68
End: 2023-08-01
Payer: MEDICARE

## 2023-08-01 VITALS
BODY MASS INDEX: 24.96 KG/M2 | HEIGHT: 72 IN | SYSTOLIC BLOOD PRESSURE: 124 MMHG | OXYGEN SATURATION: 96 % | TEMPERATURE: 96.4 F | WEIGHT: 184.3 LBS | HEART RATE: 67 BPM | DIASTOLIC BLOOD PRESSURE: 78 MMHG

## 2023-08-01 DIAGNOSIS — R73.9 HYPERGLYCEMIA: ICD-10-CM

## 2023-08-01 DIAGNOSIS — E78.2 MIXED HYPERLIPIDEMIA: ICD-10-CM

## 2023-08-01 DIAGNOSIS — R53.83 OTHER FATIGUE: ICD-10-CM

## 2023-08-01 DIAGNOSIS — Z00.00 MEDICARE ANNUAL WELLNESS VISIT, SUBSEQUENT: Primary | ICD-10-CM

## 2023-08-01 PROCEDURE — G0438 PPPS, INITIAL VISIT: HCPCS | Performed by: FAMILY MEDICINE

## 2023-08-01 PROCEDURE — 1170F FXNL STATUS ASSESSED: CPT | Performed by: FAMILY MEDICINE

## 2023-08-02 LAB
CHOLEST SERPL-MCNC: 146 MG/DL (ref 0–200)
HBA1C MFR BLD: 5.7 % (ref 4.8–5.6)
HDLC SERPL-MCNC: 52 MG/DL (ref 40–60)
LDLC SERPL CALC-MCNC: 79 MG/DL (ref 0–100)
T4 FREE SERPL-MCNC: 1.13 NG/DL (ref 0.93–1.7)
TRIGL SERPL-MCNC: 80 MG/DL (ref 0–150)
TSH SERPL DL<=0.005 MIU/L-ACNC: 3.29 UIU/ML (ref 0.27–4.2)
VLDLC SERPL CALC-MCNC: 15 MG/DL (ref 5–40)

## 2023-08-11 ENCOUNTER — TELEPHONE (OUTPATIENT)
Dept: ONCOLOGY | Facility: CLINIC | Age: 68
End: 2023-08-11
Payer: MEDICARE

## 2023-08-11 NOTE — TELEPHONE ENCOUNTER
Attempted to call [facility] to clarify dental procedure patient is to have to verify how long patient needs to HOLD Eliquis. Dental office is not opened today to clarify information.

## 2023-09-14 RX ORDER — ATORVASTATIN CALCIUM 40 MG/1
TABLET, FILM COATED ORAL
Qty: 90 TABLET | Refills: 1 | Status: SHIPPED | OUTPATIENT
Start: 2023-09-14

## 2023-12-04 ENCOUNTER — TRANSCRIBE ORDERS (OUTPATIENT)
Dept: LAB | Facility: HOSPITAL | Age: 68
End: 2023-12-04
Payer: MEDICARE

## 2023-12-04 ENCOUNTER — LAB (OUTPATIENT)
Dept: LAB | Facility: HOSPITAL | Age: 68
End: 2023-12-04
Payer: MEDICARE

## 2023-12-04 DIAGNOSIS — C61 MALIGNANT NEOPLASM OF PROSTATE: ICD-10-CM

## 2023-12-04 DIAGNOSIS — C61 MALIGNANT NEOPLASM OF PROSTATE: Primary | ICD-10-CM

## 2023-12-04 LAB — PSA SERPL-MCNC: 0.04 NG/ML (ref 0–4)

## 2023-12-04 PROCEDURE — 84153 ASSAY OF PSA TOTAL: CPT

## 2023-12-04 PROCEDURE — 36415 COLL VENOUS BLD VENIPUNCTURE: CPT

## 2024-01-23 NOTE — PROGRESS NOTES
.     REASON FOR FOLLOW-UP:     1. Subacute stroke involving the right cerebellum, and also bilateral lower extremity calf vein thrombosis in April 2020.    Hypercoagulable work-up was completely negative.    Patient is on Eliquis anticoagulation since 04/27/2020.   Venous Doppler study on 7/10/2020 reported resolution of bilateral lower extremity calf vein thrombosis.    2.  Anemia Hb 8.2 in April 2020 during his hospitalization.    Laboratory studies showed appropriate ferritin level 189 and slightly low iron saturation 12%.  Normal vitamin B12 level and folate level.  Hemoglobin was improving without transfusion.  Anemia resolved on 7/17/2020 with Hb 14.7.    HISTORY OF PRESENT ILLNESS: Patient is a 68 y.o. male with hyperlipidemia, gastroesophageal flux disease, and history of stroke, who presented today for a 6-month follow-up.  He continues on Eliquis 5 mg twice daily, tolerating well without signs or symptoms of bleeding.  No evidence of recurrent stroke.  He is feeling well today without any new concerns.    Past Medical History:   Diagnosis Date    Anxiety     Aspiration pneumonia     Benign prostatic hyperplasia     Depression     DVT, bilateral lower limbs     Dysphagia     Falls     2 falls due to ( 1) balance  (2) tripped    GERD (gastroesophageal reflux disease) 04/2020    Emergency Room Diagnosis    HL (hearing loss)     Hx of radiation therapy     Hyperlipidemia April 2020    Kidney stone     Lightheadedness     OCD (obsessive compulsive disorder)     Prostate cancer     prostate    Stroke (cerebrum) 05/2020    Ulcer 04/2020    Emergency Room Diagnosis   Respiratory failure, required ventilation support in April 2020.  Coronary artery disease/ischemic disease with cardiac catheterization on 4/27/2020.     Past Surgical History:   Procedure Laterality Date    CARDIAC CATHETERIZATION N/A 04/27/2020    Procedure: Left Heart Cath;  Surgeon: Remi John MD;  Location:  INVASIVE  LOCATION;  Service: Cardiovascular;  Laterality: N/A;    CARDIAC CATHETERIZATION N/A 04/27/2020    Procedure: Coronary angiography;  Surgeon: Remi John MD;  Location: [facility] CATH INVASIVE LOCATION;  Service: Cardiovascular;  Laterality: N/A;    CARDIAC CATHETERIZATION N/A 04/27/2020    Procedure: Left ventriculography;  Surgeon: Remi John MD;  Location:  [facility] CATH INVASIVE LOCATION;  Service: Cardiovascular;  Laterality: N/A;    ENDOSCOPY N/A 04/16/2020    Procedure: ESOPHAGOGASTRODUODENOSCOPY WITH COLD BIOPSIES;  Surgeon: Renetta Oden MD;  Location: [facility] ENDOSCOPY;  Service: Gastroenterology;  Laterality: N/A;  PRE- DYSPHAGIA, ABNORMAL CT  POST- ESOPHAGITIS WITH LARGE ESOPHAGEAL ULCER, HIATAL HERNIA, RETAINED FOOD    ENDOSCOPY N/A 06/20/2020    Procedure: ESOPHAGOGASTRODUODENOSCOPY with biopsies;  Surgeon: Rosanna Ghosh MD;  [facility];  Service: Gastroenterology;  Laterality: N/A;  pre- hx esophageal tear  post- esophadeal ring, hiatal hernia, gastritis, irregular z line, esophageal diverticumum    ENDOSCOPY N/A 6/19/2023    Procedure: ESOPHAGOGASTRODUODENOSCOPY with bx;  Surgeon: Rosanna Ghosh MD;  Location: [facility] ENDOSCOPY;  Service: Gastroenterology;  Laterality: N/A;  pre: hx EOE, dysphagia  post:hiatal hernia, gastritis    INSERTION PROSTATE RADIATION SEED      radiation for prostate ca - pt unsure if it was seeds or not    UPPER GASTROINTESTINAL ENDOSCOPY  07/2020    esophageal tear     ONCOLOGIC/HEMATOLOGIC HISTORY: (History from previous dates can be found in the separate document.)    The patient is a 65 y.o. year old male who has history of prostate cancer post radiation therapy in [place] 2000, more than 15 years ago, and also history of anxiety/depression, and chronic dysphagia, who has complicated medical history this time since his admission on 04/16/2020. This patient presented to the emergency room because of dyspnea and he was found having  bilateral pneumonia and respiratory failure. He was actually admitted to the Intensive Care Unit and was on ventilation support. He was tested negative for COVID-19 infection. Nevertheless he had CT scan examination on 04/16/2020 which reported circumferential thickening of the distal esophagus, and also bilateral pulmonary infiltration. The patient was seen by GI service and Juan Babcock MD, did EGD examination on 04/16/2020. This study reported ulceration and mucosal breaks involving at least 75% of the esophageal circumference with esophagitis but no recent bleeding. Biopsy was taken. Pathology evaluation reported no evidence of malignancy. There was moderate active inflammation with eosinophil. The patient had gastric tube placed due to his GI problem.     This patient seems also had stroke evident on brain MRI examination, at subacute infarction of the lower right cerebellum. There was also post infarct enhancement and evidence of small petechial hemorrhage.      This patient had bilateral venous duplex study in the lower extremities on 04/26/2020 and this study reported right leg acute venous thrombosis in the peroneal vein and left leg acute deep vein thrombosis involving the peroneal and gastrocnemius veins. All other veins were normal bilaterally.      The patient currently has been on Eliquis anticoagulation which I agree with.      The patient reports he had issue with dysphagia for quite a significant time and sometimes with food stuck. Most time he was able to cough it out or swallow it without further incident, however this time prior to the ER visit he really had food stuck and was kind of choking. He was brought up to the emergency room by ambulance.       I saw him as a consult during his hospitalization in April 2020 after a stroke that required ICU admission from 4/16/2020 to 4/30/2020.    Patient reports he is on Eliquis anticoagulation 5 mg twice a day, denies bleeding or bruising.  He is  recovering from his stroke, he still has some imbalance month but improved.  He denies headaches vision changes.  His performance status is ECOG 1-0.    Patient reports he continues taking Protonix.  He denies melena hematochezia.    He has no complaints at this time.     I reviewed the medical records, and found out that he had repeated EGD examination 6/20/2020 which reported evidence of esophagitis and gastritis.  This is confirmed by by pathology evaluation.      Laboratory studies, on 7/17/2020 reported normalized hemoglobin 14.7.  He has normal platelets 219,000 and WBC 6930 including ANC 4070 lymphocytes 1600.     I summarized the results of hypercoagulable work-up for him.  In April 2020 we obtain comprehensive laboratory work-up during his hospitalization.  He had a negative work-up.  He was tested negative for factor II mutation, factor V Leyden mutation, and a normal Antithrombin activity 103%, protein C activity 109% and protein S activity 95%.  He was tested negative for anticardiolipin antibodies, anti-beta-2 5.2  Antibodies and anti-phosphatidylserine antibodies in the negative for lupus anticoagulant.    Lab results in April 2020 reported ferritin 189, iron saturation 12%, free iron 28 TIBC 228 and vitamin B12 at 541 and RBC folate 1025 ng/mL on 4/22/2020.  Had elevated C-reactive protein 4.27 mg/dL.  His hemoglobin was 10.1, platelets 199,000 and WBC 7180 on the same day.  Serum folate was 8.4 ng/mL on 4/29/2020.      Laboratory studies on 7/23/2021 reported complete normal CBC including Hb 15.4, platelets 241,000 WBC 6470.     MEDICATIONS    Current Outpatient Medications:     apixaban (Eliquis) 5 MG tablet tablet, Take 1 tablet by mouth Every 12 (Twelve) Hours., Disp: 180 tablet, Rfl: 1    atorvastatin (LIPITOR) 40 MG tablet, TAKE 1 TABLET BY MOUTH EVERY DAY, Disp: 90 tablet, Rfl: 1    clomiPRAMINE (ANAFRANIL) 50 MG capsule, Take 3 capsules by mouth Every Night., Disp: 270 capsule, Rfl: 1     "multivitamin with minerals tablet tablet, Take 1 tablet by mouth Daily., Disp: , Rfl:     pantoprazole (PROTONIX) 40 MG EC tablet, TAKE 1 TABLET BY MOUTH EVERY DAY BEFORE BREAKFAST, Disp: 90 tablet, Rfl: 3    ALLERGIES:   No Known Allergies      SOCIAL HISTORY:       Social History     Socioeconomic History    Marital status:      Spouse name: Kelsey    Number of children: 2   Tobacco Use    Smoking status: Never    Smokeless tobacco: Never   Vaping Use    Vaping Use: Never used   Substance and Sexual Activity    Alcohol use: Not Currently     Alcohol/week: 1.0 standard drink of alcohol     Types: 1 Cans of beer per week    Drug use: No    Sexual activity: Defer     Partners: Female     FAMILY HISTORY:  Family History   Problem Relation Age of Onset    Glaucoma Mother     Alzheimer's disease Mother     Hypertension Father     Diabetes Father     Liver cancer Father     Nephrolithiasis Father     Multiple myeloma Sister     Other Brother         liver transplant    Multiple myeloma Brother     Liver cancer Brother     Prostate cancer Brother     Arthritis Other     Diabetes Other     Cancer Other     Malig Hyperthermia Neg Hx           Vitals:    01/25/24 1351   BP: 133/84   Pulse: 71   Resp: 18   Temp: 97.1 °F (36.2 °C)   TempSrc: Temporal   SpO2: 99%   Weight: 81.7 kg (180 lb 1.6 oz)   Height: 182.9 cm (72.01\")   PainSc: 0-No pain           1/25/2024     1:52 PM   Current Status   ECOG score 0      PHYSICAL EXAM:    GENERAL:  Well-developed, well-nourished male in no acute distress.  Orientated to time place and the people.  SKIN:  Warm, dry without rashes, purpura or petechiae.  HEENT:  Normocephalic.   LYMPHATICS:  No cervical, supraclavicular adenopathy.  CHEST: Normal respiratory effort.  Lungs clear to auscultation. Good airflow.  CARDIAC:  Regular rate and rhythm without murmurs. Normal S1,S2.  ABDOMEN:  Soft, nontender with no organomegaly or masses.  Bowel sounds normal.  EXTREMITIES:  No lower " extremity edema.    RECENT LABS:    Lab Results   Component Value Date    WBC 6.34 01/25/2024    HGB 14.7 01/25/2024    HCT 45.2 01/25/2024    MCV 84.3 01/25/2024     01/25/2024     Lab Results   Component Value Date    NEUTROABS 3.63 01/25/2024     Lab Results   Component Value Date    GLUCOSE 79 07/20/2023    BUN 18 07/20/2023    CREATININE 1.16 07/20/2023    EGFRIFNONA 63 04/30/2021    EGFRIFAFRI 82 04/16/2019    BCR 15.5 07/20/2023    K 4.4 07/20/2023    CO2 28.8 07/20/2023    CALCIUM 10.1 07/20/2023    PROTENTOTREF 7.2 05/02/2022    ALBUMIN 4.0 07/20/2023    LABIL2 1.5 05/02/2022    AST 27 07/20/2023    ALT 23 07/20/2023       Assessment & Plan     ASSESSMENT:  1. Subacute stroke involving the right cerebellum, and also bilateral lower extremity calf vein thrombosis in April 2020.    His brain MRI on 4/24/2020 also reported chronic small vascular ischemic disease.    Hypercoagulable work-up in April 2020 was completely negative.    Patient currently is on Eliquis anticoagulation since  04/27/2020 after initial Lovenox treatment.   Venous Doppler study on 7/10/2020 reported resolution of bilateral lower extremity calf vein thrombosis.    I discussed with the patient on 7/17/2020 about his laboratory work-up which is completely negative for hypercoagulable status in April 2020 when he suffered a significant stroke in the cerebellum.  Despite complete resolution of the calf vein thrombosis, I think his stroke in the cerebellum was significant.  He needs long-term anticoagulation.  I recommended to continue anticoagulation.    Patient is examined 1/15/2021, tolerating Eliquis anticoagulation.  This will be continued.  On 7/23/2021, patient reports good tolerance with no easy bleeding or bruising.  No new signs of stroke or DVT.  Continue Eliquis.  On 1/14/2022, patient reports doing well, no recurrence of stroke, no evidence for DVT.  Tolerating Eliquis anticoagulation 5 mg twice a day.  This will be  continued.  On 7/25/2022 patient reports good tolerance to Eliquis anticoagulation.  No evidence for recurrent stroke or DVT.  Will recommend to continue Eliquis for lifetime.  1/27/2023: Continue Eliquis 5 mg twice daily.  Tolerating well without signs or symptoms of bleeding.  Reports being compliant with Eliquis.  We did again discuss recommendations to continue Eliquis lifelong.  Lab study on 07/20/2023 reported normal CBC and CMP., patient reports good tolerance to Eliquis without bleeding or bruising. Patient will continue Eliquis anticoagulation.  1/25/2024: Continues Eliquis 5 mg twice daily.  Tolerating well without signs or symptoms of bleeding.  No evidence of recurrent stroke.    2.  Anemia in April 2020 during his hospitalization.   Laboratory studies showed appropriate ferritin level 189 and slightly low iron saturation 12% in April 2020.  Normal vitamin B12 level and folate level.   Patient has normalized hemoglobin 14.7 on 7/17/2020.  Continue to have normal hemoglobin 14.8 on 1/15/2021.  Continue to monitor due to risk of bleeding on anticoagulation.  Outside lab reported hemoglobin 13.0 on 4/30/2021.    Normal hemoglobin 15.4 on 7/23/2021.  Continue to monitor.  On 1/14/2022 hemoglobin 14.5 MCV 87.3.  Hemoglobin 14.8 on 1/27/2023.  On 7/20/2023, hemoglobin is 14.3. Patient reports no melena, hematochezia, or other bleeding.  Hemoglobin today 14.7.    3. Esophageal ulcer status post EGD on 04/16/2020, biopsy sample showed no evidence of malignancy.   The patient was followed by GI service, started on Protonix.  Repeated EGD examination 6/20/2020 reported esophagitis and gastritis.  No esophageal ulcer.  Today patient has no specific complaint.     4.  COVID-19 vaccination.    Patient reports in July 2021 that he is fully vaccinated against COVID-19.      PLAN:   Continue Eliquis 5 mg twice a day.  Planning lifelong anticoagulation, this is agreed by neurology service.    Patient will hold Eliquis  for 48 hours prior to any dental cleaning. He can resume Eliquis the same day as the dental cleaning.  Return in 6 months for MD follow-up.  Call/return sooner if needed.    CC:  MD Yuliya Alegria APRN.    MD Bakari Nelson MD / RONNA Garcia MD

## 2024-01-25 ENCOUNTER — LAB (OUTPATIENT)
Dept: OTHER | Facility: HOSPITAL | Age: 69
End: 2024-01-25
Payer: MEDICARE

## 2024-01-25 ENCOUNTER — OFFICE VISIT (OUTPATIENT)
Dept: ONCOLOGY | Facility: CLINIC | Age: 69
End: 2024-01-25
Payer: MEDICARE

## 2024-01-25 VITALS
TEMPERATURE: 97.1 F | RESPIRATION RATE: 18 BRPM | SYSTOLIC BLOOD PRESSURE: 133 MMHG | BODY MASS INDEX: 24.39 KG/M2 | DIASTOLIC BLOOD PRESSURE: 84 MMHG | HEIGHT: 72 IN | WEIGHT: 180.1 LBS | OXYGEN SATURATION: 99 % | HEART RATE: 71 BPM

## 2024-01-25 DIAGNOSIS — Z79.01 ANTICOAGULATION ADEQUATE: ICD-10-CM

## 2024-01-25 DIAGNOSIS — Z86.718 HISTORY OF DEEP VEIN THROMBOSIS (DVT) OF LOWER EXTREMITY: ICD-10-CM

## 2024-01-25 DIAGNOSIS — I63.10 CEREBROVASCULAR ACCIDENT (CVA) DUE TO EMBOLISM OF PRECEREBRAL ARTERY: ICD-10-CM

## 2024-01-25 DIAGNOSIS — Z86.718 HISTORY OF DEEP VEIN THROMBOSIS (DVT) OF LOWER EXTREMITY: Primary | ICD-10-CM

## 2024-01-25 LAB
BASOPHILS # BLD AUTO: 0.07 10*3/MM3 (ref 0–0.2)
BASOPHILS NFR BLD AUTO: 1.1 % (ref 0–1.5)
DEPRECATED RDW RBC AUTO: 45.5 FL (ref 37–54)
EOSINOPHIL # BLD AUTO: 0.28 10*3/MM3 (ref 0–0.4)
EOSINOPHIL NFR BLD AUTO: 4.4 % (ref 0.3–6.2)
ERYTHROCYTE [DISTWIDTH] IN BLOOD BY AUTOMATED COUNT: 14.9 % (ref 12.3–15.4)
HCT VFR BLD AUTO: 45.2 % (ref 37.5–51)
HGB BLD-MCNC: 14.7 G/DL (ref 13–17.7)
IMM GRANULOCYTES # BLD AUTO: 0.02 10*3/MM3 (ref 0–0.05)
IMM GRANULOCYTES NFR BLD AUTO: 0.3 % (ref 0–0.5)
LYMPHOCYTES # BLD AUTO: 1.67 10*3/MM3 (ref 0.7–3.1)
LYMPHOCYTES NFR BLD AUTO: 26.3 % (ref 19.6–45.3)
MCH RBC QN AUTO: 27.4 PG (ref 26.6–33)
MCHC RBC AUTO-ENTMCNC: 32.5 G/DL (ref 31.5–35.7)
MCV RBC AUTO: 84.3 FL (ref 79–97)
MONOCYTES # BLD AUTO: 0.67 10*3/MM3 (ref 0.1–0.9)
MONOCYTES NFR BLD AUTO: 10.6 % (ref 5–12)
NEUTROPHILS NFR BLD AUTO: 3.63 10*3/MM3 (ref 1.7–7)
NEUTROPHILS NFR BLD AUTO: 57.3 % (ref 42.7–76)
NRBC BLD AUTO-RTO: 0 /100 WBC (ref 0–0.2)
PLATELET # BLD AUTO: 247 10*3/MM3 (ref 140–450)
PMV BLD AUTO: 9.6 FL (ref 6–12)
RBC # BLD AUTO: 5.36 10*6/MM3 (ref 4.14–5.8)
WBC NRBC COR # BLD AUTO: 6.34 10*3/MM3 (ref 3.4–10.8)

## 2024-01-25 PROCEDURE — 36415 COLL VENOUS BLD VENIPUNCTURE: CPT

## 2024-01-25 PROCEDURE — 85025 COMPLETE CBC W/AUTO DIFF WBC: CPT | Performed by: INTERNAL MEDICINE

## 2024-02-25 DIAGNOSIS — F32.A ANXIETY AND DEPRESSION: ICD-10-CM

## 2024-02-25 DIAGNOSIS — F42.2 MIXED OBSESSIONAL THOUGHTS AND ACTS: ICD-10-CM

## 2024-02-25 DIAGNOSIS — F41.9 ANXIETY AND DEPRESSION: ICD-10-CM

## 2024-02-26 RX ORDER — CLOMIPRAMINE HYDROCHLORIDE 50 MG/1
150 CAPSULE ORAL NIGHTLY
Qty: 270 CAPSULE | Refills: 1 | Status: SHIPPED | OUTPATIENT
Start: 2024-02-26

## 2024-03-11 RX ORDER — ATORVASTATIN CALCIUM 40 MG/1
TABLET, FILM COATED ORAL
Qty: 90 TABLET | Refills: 1 | Status: SHIPPED | OUTPATIENT
Start: 2024-03-11

## 2024-07-24 DIAGNOSIS — Z86.718 HISTORY OF DEEP VEIN THROMBOSIS (DVT) OF LOWER EXTREMITY: Primary | ICD-10-CM

## 2024-07-24 DIAGNOSIS — Z79.01 ANTICOAGULATION ADEQUATE: ICD-10-CM

## 2024-08-05 ENCOUNTER — OFFICE VISIT (OUTPATIENT)
Dept: FAMILY MEDICINE CLINIC | Facility: CLINIC | Age: 69
End: 2024-08-05
Payer: MEDICARE

## 2024-08-05 VITALS
BODY MASS INDEX: 23.38 KG/M2 | HEIGHT: 72 IN | DIASTOLIC BLOOD PRESSURE: 78 MMHG | OXYGEN SATURATION: 98 % | RESPIRATION RATE: 16 BRPM | SYSTOLIC BLOOD PRESSURE: 134 MMHG | HEART RATE: 75 BPM | WEIGHT: 172.6 LBS | TEMPERATURE: 97.6 F

## 2024-08-05 DIAGNOSIS — F42.2 MIXED OBSESSIONAL THOUGHTS AND ACTS: ICD-10-CM

## 2024-08-05 DIAGNOSIS — R73.9 HYPERGLYCEMIA: ICD-10-CM

## 2024-08-05 DIAGNOSIS — Z00.00 MEDICARE ANNUAL WELLNESS VISIT, SUBSEQUENT: Primary | ICD-10-CM

## 2024-08-05 DIAGNOSIS — K20.0 ESOPHAGITIS, EOSINOPHILIC: ICD-10-CM

## 2024-08-05 DIAGNOSIS — R53.83 OTHER FATIGUE: ICD-10-CM

## 2024-08-05 DIAGNOSIS — E78.49 OTHER HYPERLIPIDEMIA: ICD-10-CM

## 2024-08-05 DIAGNOSIS — K21.00 GASTROESOPHAGEAL REFLUX DISEASE WITH ESOPHAGITIS WITHOUT HEMORRHAGE: ICD-10-CM

## 2024-08-05 NOTE — PROGRESS NOTES
Subjective   The ABCs of the Annual Wellness Visit  Medicare Wellness Visit      Leonardo Youssef is a 69 y.o. patient who presents for a Medicare Wellness Visit.    The following portions of the patient's history were reviewed and   updated as appropriate: allergies, current medications, past family history, past medical history, past social history, past surgical history, and problem list.    Compared to one year ago, the patient's physical   health is better.  Compared to one year ago, the patient's mental   health is better.    Recent Hospitalizations:  He was not admitted to the hospital during the last year.     Current Medical Providers:  Patient Care Team:  Renuka Costello MD as PCP - General (Family Medicine)  Lebron Addison MD as Referring Physician (Hospitalist)  Cezar Streeter MD PhD as Consulting Physician (Hematology and Oncology)    Outpatient Medications Prior to Visit   Medication Sig Dispense Refill    apixaban (Eliquis) 5 MG tablet tablet Take 1 tablet by mouth Every 12 (Twelve) Hours. 180 tablet 1    atorvastatin (LIPITOR) 40 MG tablet TAKE 1 TABLET BY MOUTH EVERY DAY 90 tablet 1    clomiPRAMINE (ANAFRANIL) 50 MG capsule TAKE 3 CAPSULES BY MOUTH EVERY NIGHT. 270 capsule 1    multivitamin with minerals tablet tablet Take 1 tablet by mouth Daily.      pantoprazole (PROTONIX) 40 MG EC tablet TAKE 1 TABLET BY MOUTH EVERY DAY BEFORE BREAKFAST 90 tablet 3     No facility-administered medications prior to visit.     No opioid medication identified on active medication list. I have reviewed chart for other potential  high risk medication/s and harmful drug interactions in the elderly.      Aspirin is not on active medication list.  Aspirin use is contraindicated for this patient due to: current use of Eliquis.  .    Patient Active Problem List   Diagnosis    Breast tenderness in male    Family history of multiple myeloma    OCD (obsessive compulsive disorder)    Anxiety and depression     "Epigastric pain    Abnormal CT of the abdomen    Acute kidney injury superimposed on chronic kidney disease    Acute respiratory failure with hypoxia    Aspiration pneumonia    Sepsis associated hypotension    Dysphagia    Hypophosphatemia    PICC (peripherally inserted central catheter) in place    Acute urinary retention    Esophageal tear    Acute deep vein thrombosis (DVT) of lower extremity    Cerebellar infarct    Anemia    Esophageal dysphagia    Esophagus tear    Esophagitis, eosinophilic    Gastroesophageal reflux disease with esophagitis    Anticoagulation adequate    History of deep vein thrombosis (DVT) of lower extremity    Cerebrovascular accident (CVA) due to embolism of precerebral artery    Medial epicondylitis of right elbow    History of CVA with residual deficit    Dizziness on standing     Advance Care Planning (Click this link to access ACP Navigator)  Advance Directive is not on file.  ACP discussion was held with the patient during this visit. Patient does not have an advance directive, declines further assistance.        Objective   Vitals:    08/05/24 0910   BP: 134/78   BP Location: Left arm   Patient Position: Sitting   Cuff Size: Adult   Pulse: 75   Resp: 16   Temp: 97.6 °F (36.4 °C)   TempSrc: Oral   SpO2: 98%   Weight: 78.3 kg (172 lb 9.6 oz)   Height: 182.9 cm (72.01\")   PainSc: 0-No pain       Estimated body mass index is 23.4 kg/m² as calculated from the following:    Height as of this encounter: 182.9 cm (72.01\").    Weight as of this encounter: 78.3 kg (172 lb 9.6 oz).    BMI is within normal parameters. No other follow-up for BMI required.        Does the patient have evidence of cognitive impairment? No  Lab Results   Component Value Date    CHLPL 130 08/05/2024    TRIG 59 08/05/2024    HDL 58 08/05/2024    LDL 59 08/05/2024    VLDL 13 08/05/2024    HGBA1C 5.30 08/05/2024                                                                                                Health  Risk " Assessment    Smoking Status:  Social History     Tobacco Use   Smoking Status Never   Smokeless Tobacco Never     Alcohol Consumption:  Social History     Substance and Sexual Activity   Alcohol Use Not Currently    Alcohol/week: 1.0 standard drink of alcohol    Types: 1 Cans of beer per week    Comment: Per week     Fall Risk Screen:  KATY Fall Risk Assessment was completed, and patient is at LOW risk for falls.Assessment completed on:2024    Depression Screenin/5/2024     9:11 AM   PHQ-2/PHQ-9 Depression Screening   Little Interest or Pleasure in Doing Things 0-->not at all   Feeling Down, Depressed or Hopeless 0-->not at all   PHQ-9: Brief Depression Severity Measure Score 0     Health Habits and Functional and Cognitive Screenin/5/2024     9:00 AM   Functional & Cognitive Status   Do you have difficulty preparing food and eating? No   Do you have difficulty bathing yourself, getting dressed or grooming yourself? No   Do you have difficulty using the toilet? No   Do you have difficulty moving around from place to place? No   Do you have trouble with steps or getting out of a bed or a chair? No   Current Diet Well Balanced Diet   Dental Exam Up to date   Eye Exam Up to date   Exercise (times per week) 7 times per week   Current Exercises Include Walking   Do you need help using the phone?  No   Are you deaf or do you have serious difficulty hearing?  No   Do you need help to go to places out of walking distance? No   Do you need help shopping? No   Do you need help preparing meals?  No   Do you need help with housework?  No   Do you need help with laundry? No   Do you need help taking your medications? No   Do you need help managing money? No   Do you ever drive or ride in a car without wearing a seat belt? No   Have you felt unusual stress, anger or loneliness in the last month? No   Who do you live with? Spouse   If you need help, do you have trouble finding someone available to you? No    Have you been bothered in the last four weeks by sexual problems? No   Do you have difficulty concentrating, remembering or making decisions? No             Age-appropriate Screening Schedule:  Refer to the list below for future screening recommendations based on patient's age, sex and/or medical conditions. Orders for these recommended tests are listed in the plan section. The patient has been provided with a written plan.    Health Maintenance List  Health Maintenance   Topic Date Due    ZOSTER VACCINE (1 of 2) Never done    COVID-19 Vaccine (7 - 2023-24 season) 11/16/2023    GASTROSCOPY (EGD)  06/19/2024    INFLUENZA VACCINE  08/01/2024    ANNUAL WELLNESS VISIT  08/05/2025    LIPID PANEL  08/05/2025    COLORECTAL CANCER SCREENING  06/14/2026    TDAP/TD VACCINES (2 - Td or Tdap) 01/05/2029    HEPATITIS C SCREENING  Completed    Pneumococcal Vaccine 65+  Completed                                                                                                                                                CMS Preventative Services Quick Reference  Risk Factors Identified During Encounter  Immunizations Discussed/Encouraged: Shingrix and COVID19  Dental Screening Recommended  Vision Screening Recommended    The above risks/problems have been discussed with the patient.  Pertinent information has been shared with the patient in the After Visit Summary.  An After Visit Summary and PPPS were made available to the patient.    Follow Up:   Next Medicare Wellness visit to be scheduled in 1 year.         Additional E&M Note during same encounter follows:  Patient has additional, significant, and separately identifiable condition(s)/problem(s) that require work above and beyond the Medicare Wellness Visit     Chief Complaint  Medicare Wellness-subsequent    Subjective   HPI  Leonardo is also being seen today for hyperlipidemia and anxiety and OCD.  Patient with history of anxiety and OCD stable on clomipramine.       "          Objective   Vital Signs:  /78 (BP Location: Left arm, Patient Position: Sitting, Cuff Size: Adult)   Pulse 75   Temp 97.6 °F (36.4 °C) (Oral)   Resp 16   Ht 182.9 cm (72.01\")   Wt 78.3 kg (172 lb 9.6 oz)   SpO2 98%   BMI 23.40 kg/m²   Physical Exam            Assessment and Plan               Medicare annual wellness visit, subsequent    Gastroesophageal reflux disease with esophagitis without hemorrhage    Mixed obsessional thoughts and acts    Esophagitis, eosinophilic    Other hyperlipidemia     Other fatigue    Hyperglycemia    Leonardo Youssef is a 69-year-old male patient seen today for  Medicare wellness visit and follow-up on  Hyperlipidemia, denies any problem with the medication, will check CMP and lipids today.  Continue same  OCD and anxiety stable on current doses continue same.  GERD patient with history of GERD and eosinophilic esophagitis, last EGD 1 year ago.  Not taking any medication.  His previous GI recommended a EGD in 1 year.  Will refer to the GI       Orders Placed This Encounter   Procedures    Comprehensive Metabolic Panel     Order Specific Question:   Release to patient     Answer:   Routine Release [[id number]]     Order Specific Question:   [facility] Has the patient fasted?     Answer:   Yes    Lipid Panel     Order Specific Question:   Release to patient     Answer:   Routine Release [[id number]]     Order Specific Question:   [facility] Has the patient fasted?     Answer:   Yes    Hemoglobin A1c     Order Specific Question:   Release to patient     Answer:   Routine Release [[id number]]     Order Specific Question:   [facility] Has the patient fasted?     Answer:   Yes    TSH Rfx On Abnormal To Free T4     Order Specific Question:   Release to patient     Answer:   Routine Release [[id number]]     Order Specific Question:   [facility] Has the patient fasted?     Answer:   Yes    Ambulatory Referral to Gastroenterology     Referral Priority:   Routine     Referral Type:   " Consultation     Referral Reason:   Specialty Services Required     Referred to Provider:   Juan Babcock MD     Requested Specialty:   Gastroenterology     Number of Visits Requested:   1    CBC & Differential     Order Specific Question:   Manual Differential     Answer:   No     Order Specific Question:   Release to patient     Answer:   Routine Release [[id number]]    CBC & Differential     Order Specific Question:   [facility] Has the patient fasted?     Answer:   Yes             Follow Up   No follow-ups on file.  Patient was given instructions and counseling regarding his condition or for health maintenance advice. Please see specific information pulled into the AVS if appropriate.

## 2024-08-06 LAB
ALBUMIN SERPL-MCNC: 4.6 G/DL (ref 3.5–5.2)
ALBUMIN/GLOB SERPL: 1.8 G/DL
ALP SERPL-CCNC: 70 U/L (ref 39–117)
ALT SERPL-CCNC: 27 U/L (ref 1–41)
AST SERPL-CCNC: 30 U/L (ref 1–40)
BASOPHILS # BLD AUTO: 0.06 10*3/MM3 (ref 0–0.2)
BASOPHILS NFR BLD AUTO: 1.1 % (ref 0–1.5)
BILIRUB SERPL-MCNC: 0.7 MG/DL (ref 0–1.2)
BUN SERPL-MCNC: 14 MG/DL (ref 8–23)
BUN/CREAT SERPL: 13.5 (ref 7–25)
CALCIUM SERPL-MCNC: 10.1 MG/DL (ref 8.6–10.5)
CHLORIDE SERPL-SCNC: 99 MMOL/L (ref 98–107)
CHOLEST SERPL-MCNC: 130 MG/DL (ref 0–200)
CO2 SERPL-SCNC: 29 MMOL/L (ref 22–29)
CREAT SERPL-MCNC: 1.04 MG/DL (ref 0.76–1.27)
EGFRCR SERPLBLD CKD-EPI 2021: 76.8 ML/MIN/1.73
EOSINOPHIL # BLD AUTO: 0.2 10*3/MM3 (ref 0–0.4)
EOSINOPHIL NFR BLD AUTO: 3.6 % (ref 0.3–6.2)
ERYTHROCYTE [DISTWIDTH] IN BLOOD BY AUTOMATED COUNT: 13.7 % (ref 12.3–15.4)
GLOBULIN SER CALC-MCNC: 2.5 GM/DL
GLUCOSE SERPL-MCNC: 96 MG/DL (ref 65–99)
HBA1C MFR BLD: 5.3 % (ref 4.8–5.6)
HCT VFR BLD AUTO: 48.2 % (ref 37.5–51)
HDLC SERPL-MCNC: 58 MG/DL (ref 40–60)
HGB BLD-MCNC: 15.8 G/DL (ref 13–17.7)
IMM GRANULOCYTES # BLD AUTO: 0.01 10*3/MM3 (ref 0–0.05)
IMM GRANULOCYTES NFR BLD AUTO: 0.2 % (ref 0–0.5)
LDLC SERPL CALC-MCNC: 59 MG/DL (ref 0–100)
LYMPHOCYTES # BLD AUTO: 1.4 10*3/MM3 (ref 0.7–3.1)
LYMPHOCYTES NFR BLD AUTO: 24.9 % (ref 19.6–45.3)
MCH RBC QN AUTO: 28.6 PG (ref 26.6–33)
MCHC RBC AUTO-ENTMCNC: 32.8 G/DL (ref 31.5–35.7)
MCV RBC AUTO: 87.2 FL (ref 79–97)
MONOCYTES # BLD AUTO: 0.57 10*3/MM3 (ref 0.1–0.9)
MONOCYTES NFR BLD AUTO: 10.1 % (ref 5–12)
NEUTROPHILS # BLD AUTO: 3.39 10*3/MM3 (ref 1.7–7)
NEUTROPHILS NFR BLD AUTO: 60.1 % (ref 42.7–76)
NRBC BLD AUTO-RTO: 0 /100 WBC (ref 0–0.2)
PLATELET # BLD AUTO: 249 10*3/MM3 (ref 140–450)
POTASSIUM SERPL-SCNC: 4.7 MMOL/L (ref 3.5–5.2)
PROT SERPL-MCNC: 7.1 G/DL (ref 6–8.5)
RBC # BLD AUTO: 5.53 10*6/MM3 (ref 4.14–5.8)
SODIUM SERPL-SCNC: 137 MMOL/L (ref 136–145)
TRIGL SERPL-MCNC: 59 MG/DL (ref 0–150)
TSH SERPL DL<=0.005 MIU/L-ACNC: 2.2 UIU/ML (ref 0.27–4.2)
VLDLC SERPL CALC-MCNC: 13 MG/DL (ref 5–40)
WBC # BLD AUTO: 5.63 10*3/MM3 (ref 3.4–10.8)

## 2024-08-23 DIAGNOSIS — F42.2 MIXED OBSESSIONAL THOUGHTS AND ACTS: ICD-10-CM

## 2024-08-23 DIAGNOSIS — F32.A ANXIETY AND DEPRESSION: ICD-10-CM

## 2024-08-23 DIAGNOSIS — F41.9 ANXIETY AND DEPRESSION: ICD-10-CM

## 2024-08-23 RX ORDER — CLOMIPRAMINE HYDROCHLORIDE 50 MG/1
150 CAPSULE ORAL NIGHTLY
Qty: 270 CAPSULE | Refills: 1 | Status: SHIPPED | OUTPATIENT
Start: 2024-08-23

## 2024-09-04 ENCOUNTER — TELEPHONE (OUTPATIENT)
Dept: FAMILY MEDICINE CLINIC | Facility: CLINIC | Age: 69
End: 2024-09-04
Payer: MEDICARE

## 2024-09-04 RX ORDER — ATORVASTATIN CALCIUM 40 MG/1
TABLET, FILM COATED ORAL
Qty: 90 TABLET | Refills: 1 | Status: SHIPPED | OUTPATIENT
Start: 2024-09-04

## 2024-09-04 NOTE — TELEPHONE ENCOUNTER
Caller: Leonardo Youssef    Relationship: Self    Best call back number: [phone]     What medication are you requesting: apixaban (Eliquis) 5 MG    What are your current symptoms: BLOOD THINNER FOR STROKE    How long have you been experiencing symptoms: N/A    Have you had these symptoms before:    [x] Yes  [] No    Have you been treated for these symptoms before:   [x] Yes  [] No    If a prescription is needed, what is your preferred pharmacy and phone number: [facility]/PHARMACY #[id number] - [place], [place] - [address] AT [facility] [phone] [facility] [phone]      Additional notes: THIS IS THE FIRST TIME FOR DR HUNG TO BE FILLING THIS PRESCRIPTION, PLEASE SEND IN A NEW WRITTEN ONE WITH DR HUNG'S NAME ON IT.     PATIENT USES MEDICATION FOR STROKE,

## 2024-09-05 ENCOUNTER — TELEPHONE (OUTPATIENT)
Dept: FAMILY MEDICINE CLINIC | Facility: CLINIC | Age: 69
End: 2024-09-05

## 2024-09-05 NOTE — TELEPHONE ENCOUNTER
Caller: Leonardo Youssef    Relationship: Self    Best call back number: [phone]     Which medication are you concerned about: apixaban (Eliquis) 5 MG tablet tablet [[id number]] (Order [id number])     Who prescribed you this medication:   DR HUNG      What are your concerns:   RESEND PRESCRIPTION:  [facility]/pharmacy #[id number] [place], [place] - [address] AT [facility] [phone] [facility] [phone]

## 2024-09-05 NOTE — TELEPHONE ENCOUNTER
LVM for pharmacy staff to call back. Sent RX to Pharmacy yesterday and didn't know what the problem could be.    Lou Acosta MA  09/05/24, 13:36 EDT

## 2024-09-05 NOTE — TELEPHONE ENCOUNTER
Caller: Leonardo Youssef    Relationship: Self    Best call back number: [phone]     What is the best time to reach you: ANY    Who are you requesting to speak with (clinical staff, provider,  specific staff member):       What was the call regarding: PATIENT WANTED TO KNOW IF HE SHOULD CONTINUE TAKING ELIQUIS? PATIENT IS OUT OF THIS MEDICATION. PATIENT HAD TRANSFERRED FROM , AND HE HAD PRESCRIBED IT INITIALLY.    PLEASE CALL PATIENT TO ADVISE

## 2024-09-09 ENCOUNTER — OFFICE VISIT (OUTPATIENT)
Dept: GASTROENTEROLOGY | Facility: CLINIC | Age: 69
End: 2024-09-09
Payer: MEDICARE

## 2024-09-09 VITALS
SYSTOLIC BLOOD PRESSURE: 147 MMHG | TEMPERATURE: 97.7 F | HEART RATE: 65 BPM | DIASTOLIC BLOOD PRESSURE: 88 MMHG | HEIGHT: 72 IN | BODY MASS INDEX: 23.83 KG/M2 | WEIGHT: 175.9 LBS

## 2024-09-09 DIAGNOSIS — K20.0 ESOPHAGITIS, EOSINOPHILIC: Primary | ICD-10-CM

## 2024-09-09 DIAGNOSIS — K21.00 GASTROESOPHAGEAL REFLUX DISEASE WITH ESOPHAGITIS WITHOUT HEMORRHAGE: ICD-10-CM

## 2024-09-09 PROCEDURE — 99213 OFFICE O/P EST LOW 20 MIN: CPT | Performed by: PHYSICIAN ASSISTANT

## 2024-09-09 PROCEDURE — 1160F RVW MEDS BY RX/DR IN RCRD: CPT | Performed by: PHYSICIAN ASSISTANT

## 2024-09-09 PROCEDURE — 1159F MED LIST DOCD IN RCRD: CPT | Performed by: PHYSICIAN ASSISTANT

## 2024-09-09 NOTE — PROGRESS NOTES
"Chief Complaint  Esophagitis    Subjective          History Of Present Illness:    Leonardo Youssef is a  69 y.o. male previously established with Dr. Ghosh who presents as a follow-up for eosinophilic esophagitis and heartburn.    Patient will follow with Dr. Rock and myself going forward.    Patient reports he is currently doing well from a GI standpoint. Patient is off pantoprazole. NO issues with dysphagia, odynophagia, nausea, vomiting, chest pain, abdominal pain. Patient denies any abnormal weight loss or poor appetite. He denies any lower GI symptoms such as diarrhea, constipation, melena, or hematochezia.     Additional data reviewed:  EGD 6/19/2023 -esophageal mucosal changes secondary to eosinophilic esophagitis, hiatal hernia, small amount of food residue in the stomach, gastritis, normal duodenum.  Biopsies consistent with ongoing eosinophilic esophagitis.  Biopsies consistent with Candida esophagitis.    Objective   Vital Signs:   /88   Pulse 65   Temp 97.7 °F (36.5 °C)   Ht 182.9 cm (72\")   Wt 79.8 kg (175 lb 14.4 oz)   BMI 23.86 kg/m²       Physical Exam  Vitals reviewed.   Constitutional:       General: He is not in acute distress.     Appearance: Normal appearance. He is not ill-appearing.   HENT:      Head: Normocephalic and atraumatic.      Nose: Nose normal.      Mouth/Throat:      Pharynx: Oropharynx is clear.   Eyes:      Conjunctiva/sclera: Conjunctivae normal.   Pulmonary:      Effort: Pulmonary effort is normal.   Abdominal:      General: There is no distension.      Palpations: Abdomen is soft. There is no mass.      Tenderness: There is no abdominal tenderness.   Musculoskeletal:         General: No swelling. Normal range of motion.      Cervical back: Normal range of motion.   Skin:     General: Skin is warm and dry.      Findings: No bruising or rash.   Neurological:      General: No focal deficit present.      Mental Status: He is alert and oriented to person, place, and time. "      Motor: No weakness.      Gait: Gait normal.   Psychiatric:         Mood and Affect: Mood normal.          Result Review :   The following data was reviewed by: Tawnya Morales PA-C on 09/09/2024:  CMP          8/5/2024    10:47   CMP   Glucose 96    BUN 14    Creatinine 1.04    Sodium 137    Potassium 4.7    Chloride 99    Calcium 10.1    Total Protein 7.1    Albumin 4.6    Globulin 2.5    Total Bilirubin 0.7    Alkaline Phosphatase 70    AST (SGOT) 30    ALT (SGPT) 27    BUN/Creatinine Ratio 13.5      CBC          1/25/2024    13:43 8/5/2024    10:47   CBC   WBC 6.34  5.63    RBC 5.36  5.53    Hemoglobin 14.7  15.8    Hematocrit 45.2  48.2    MCV 84.3  87.2    MCH 27.4  28.6    MCHC 32.5  32.8    RDW 14.9  13.7    Platelets 247  249            Assessment and Plan    Diagnoses and all orders for this visit:    1. Esophagitis, eosinophilic (Primary)    2. Gastroesophageal reflux disease with esophagitis without hemorrhage       Currently asymptomatic off PPI therapy.   Last EGD with evidence of reflux esophagitis but no EOE.   Would consider restarting pantoprazole therapy if symptoms resume     Follow Up   Return in about 1 year (around 9/9/2025) for Tawnya Mccarthy PA-C.    Dragon dictation used throughout this note.            Tawnya Mccarthy PA-C   [facility]  [address] [place] [address]  Office: [phone]

## 2025-02-20 DIAGNOSIS — F32.A ANXIETY AND DEPRESSION: ICD-10-CM

## 2025-02-20 DIAGNOSIS — F42.2 MIXED OBSESSIONAL THOUGHTS AND ACTS: ICD-10-CM

## 2025-02-20 DIAGNOSIS — F41.9 ANXIETY AND DEPRESSION: ICD-10-CM

## 2025-02-20 NOTE — TELEPHONE ENCOUNTER
Rx Refill Note  Requested Prescriptions     Pending Prescriptions Disp Refills    clomiPRAMINE (ANAFRANIL) 50 MG capsule [Pharmacy Med Name: CLOMIPRAMINE 50 MG CAPSULE] 270 capsule 1     Sig: TAKE 3 CAPSULES BY MOUTH EVERY NIGHT.      Last office visit with prescribing clinician: 8/5/2024   Last telemedicine visit with prescribing clinician: Visit date not found   Next office visit with prescribing clinician: Visit date not found                         Would you like a call back once the refill request has been completed: [] Yes [] No    If the office needs to give you a call back, can they leave a voicemail: [] Yes [] No    Asuncion Finn MA  02/20/25, 08:12 EST

## 2025-02-21 RX ORDER — CLOMIPRAMINE HYDROCHLORIDE 50 MG/1
150 CAPSULE ORAL NIGHTLY
Qty: 270 CAPSULE | Refills: 1 | Status: SHIPPED | OUTPATIENT
Start: 2025-02-21

## 2025-02-26 RX ORDER — ATORVASTATIN CALCIUM 40 MG/1
TABLET, FILM COATED ORAL
Qty: 90 TABLET | Refills: 1 | Status: SHIPPED | OUTPATIENT
Start: 2025-02-26

## 2025-02-26 RX ORDER — APIXABAN 5 MG/1
5 TABLET, FILM COATED ORAL
Qty: 180 TABLET | Refills: 1 | Status: SHIPPED | OUTPATIENT
Start: 2025-02-26

## 2025-02-26 NOTE — TELEPHONE ENCOUNTER
Rx Refill Note  Requested Prescriptions     Pending Prescriptions Disp Refills    Eliquis 5 MG tablet tablet [Pharmacy Med Name: ELIQUIS 5 MG TABLET] 180 tablet 1     Sig: TAKE 1 TABLET BY MOUTH EVERY 12 HOURS      Last office visit with prescribing clinician: 8/5/2024   Last telemedicine visit with prescribing clinician: Visit date not found   Next office visit with prescribing clinician: 2/26/2025                         Would you like a call back once the refill request has been completed: [] Yes [] No    If the office needs to give you a call back, can they leave a voicemail: [] Yes [] No    Asuncion Finn MA  02/26/25, 08:14 EST   noted

## 2025-02-26 NOTE — TELEPHONE ENCOUNTER
Rx Refill Note  Requested Prescriptions     Pending Prescriptions Disp Refills    atorvastatin (LIPITOR) 40 MG tablet [Pharmacy Med Name: ATORVASTATIN 40 MG TABLET] 90 tablet 1     Sig: TAKE 1 TABLET BY MOUTH EVERY DAY      Last office visit with prescribing clinician: 8/5/2024   Last telemedicine visit with prescribing clinician: Visit date not found   Next office visit with prescribing clinician: 3/4/2025                         Would you like a call back once the refill request has been completed: [] Yes [] No    If the office needs to give you a call back, can they leave a voicemail: [] Yes [] No    Asuncion Finn MA  02/26/25, 08:14 EST

## 2025-03-04 ENCOUNTER — OFFICE VISIT (OUTPATIENT)
Dept: FAMILY MEDICINE CLINIC | Facility: CLINIC | Age: 70
End: 2025-03-04
Payer: MEDICARE

## 2025-03-04 VITALS
WEIGHT: 182.2 LBS | HEART RATE: 70 BPM | RESPIRATION RATE: 16 BRPM | SYSTOLIC BLOOD PRESSURE: 120 MMHG | TEMPERATURE: 97.4 F | BODY MASS INDEX: 24.68 KG/M2 | HEIGHT: 72 IN | DIASTOLIC BLOOD PRESSURE: 76 MMHG | OXYGEN SATURATION: 98 %

## 2025-03-04 DIAGNOSIS — K20.0 ESOPHAGITIS, EOSINOPHILIC: ICD-10-CM

## 2025-03-04 DIAGNOSIS — R13.10 DYSPHAGIA, UNSPECIFIED TYPE: ICD-10-CM

## 2025-03-04 DIAGNOSIS — Z51.81 MEDICATION MONITORING ENCOUNTER: ICD-10-CM

## 2025-03-04 DIAGNOSIS — F32.A ANXIETY AND DEPRESSION: ICD-10-CM

## 2025-03-04 DIAGNOSIS — E78.2 MIXED HYPERLIPIDEMIA: Primary | ICD-10-CM

## 2025-03-04 DIAGNOSIS — I69.30 HISTORY OF CVA WITH RESIDUAL DEFICIT: ICD-10-CM

## 2025-03-04 DIAGNOSIS — F41.9 ANXIETY AND DEPRESSION: ICD-10-CM

## 2025-03-04 NOTE — PROGRESS NOTES
"Chief Complaint  Immunizations (measles), Hyperlipidemia, Anxiety, and Depression    Subjective        Leonardo Youssef presents to [facility]  Hyperlipidemia    Anxiety  His past medical history is significant for depression.   Depression  His past medical history is significant for depression.       History of Present Illness  The patient is a 69-year-old male who presents for a 6-month follow-up on his cholesterol, anxiety and depression    He is currently on Lipitor for cholesterol management.    He is also on clomipramine for anxiety, which he reports as being well-managed. He does not report any significant xerostomia, a known side effect of clomipramine.    He is on Eliquis following a cerebrovascular accident (CVA).    He has a history of prostate cancer, diagnosed at age 58, which was managed with radiation therapy. His urologist has advised against further PSA monitoring due to the stable nature of his condition over the past decade.    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 He reports occasional dysphagia, particularly when consuming certain foods.    He is uncertain about his measles vaccination status, having received it in childhood but sure  he had measles    MEDICATIONS  Lipitor, clomipramine, Eliquis    IMMUNIZATIONS  He is uncertain about his measles vaccination status, having received it in childhood but unsure if he contracted the disease.     Objective   Vital Signs:  /76 (BP Location: Left arm, Patient Position: Sitting, Cuff Size: Adult)   Pulse 70   Temp 97.4 °F (36.3 °C) (Oral)   Resp 16   Ht 182.9 cm (72\")   Wt 82.6 kg (182 lb 3.2 oz)   SpO2 98%   BMI 24.71 kg/m²   Estimated body mass index is 24.71 kg/m² as calculated from the following:    Height as of this encounter: 182.9 cm (72\").    Weight as of this encounter: 82.6 kg (182 lb 3.2 oz).       BMI is within normal " parameters. No other follow-up for BMI required.      Physical Exam  Vitals and nursing note reviewed.   Constitutional:       General: He is not in acute distress.     Appearance: He is well-developed.   HENT:      Head: Normocephalic and atraumatic.      Right Ear: Ear canal normal.      Left Ear: Ear canal normal.      Mouth/Throat:      Pharynx: Oropharynx is clear.   Eyes:      General:         Right eye: No discharge.         Left eye: No discharge.      Extraocular Movements: Extraocular movements intact.      Pupils: Pupils are equal, round, and reactive to light.   Cardiovascular:      Rate and Rhythm: Normal rate and regular rhythm.      Heart sounds: Normal heart sounds.   Pulmonary:      Effort: Pulmonary effort is normal.      Breath sounds: Normal breath sounds. No wheezing or rales.   Abdominal:      General: Bowel sounds are normal.      Palpations: Abdomen is soft. There is no mass.      Tenderness: There is no abdominal tenderness.   Musculoskeletal:      Cervical back: Normal range of motion and neck supple.   Lymphadenopathy:      Cervical: No cervical adenopathy.   Neurological:      Mental Status: He is alert and oriented to person, place, and time.        Result Review :    Common Labs   Common labs          8/5/2024    10:47 3/4/2025    08:59   Common Labs   Glucose 96  114    BUN 14  17    Creatinine 1.04  1.13    Sodium 137  137    Potassium 4.7  4.9    Chloride 99  100    Calcium 10.1  10.0    Albumin 4.6  4.3    Total Bilirubin 0.7  0.8    Alkaline Phosphatase 70  74    AST (SGOT) 30  27    ALT (SGPT) 27  28    WBC 5.63  5.79    Hemoglobin 15.8  15.8    Hematocrit 48.2  48.5    Platelets 249  302    Total Cholesterol 130     Triglycerides 59     HDL Cholesterol 58     LDL Cholesterol  59     Hemoglobin A1C 5.30                    Assessment and Plan   Diagnoses and all orders for this visit:    1. Routine physical examination (Primary)    2. Anxiety and depression    3. Mixed  hyperlipidemia  -     Comprehensive Metabolic Panel    4. History of CVA with residual deficit    5. Esophagitis, eosinophilic  -     Ambulatory Referral to Gastroenterology    6. Dysphagia, unspecified type  -     Ambulatory Referral to Gastroenterology    7. Medication monitoring encounter  -     CBC & Differential        Assessment & Plan  Leonardo Youssef is a 69-year-old male patient seen today for follow-up on    1. Hypercholesterolemia.  His cholesterol levels are within the normal range. He will continue taking Lipitor as prescribed.  Will check CMP    2. Anxiety and depression  He is currently taking clomipramine for anxiety. No significant side effects such as dryness of the mouth have been reported. He will continue with the current medication regimen.    3. History of Cerebrovascular Accident (CVA).  He is on Eliquis for stroke prevention. A comprehensive metabolic panel (CMP) and complete blood count (CBC) will be ordered today to monitor kidney function and overall health due to the use of blood thinners.      4. Eosinophilic Candidiasis.  He had eosinophilic candidiasis identified during an EGD in 02/2023. A referral to gastroenterology will be made for a follow-up EGD to ensure there are no ongoing issues, especially since he occasionally experiences food getting caught in his throat.  Will refer to GI.  New referral placed    He is uncertain about his measles vaccination status, having received it in childhood but unsure if he contracted the disease. He does not require a measles booster at this time, as per current guidelines for those born before 1959    Follow-up  The patient will follow up in 6 months for a Medicare wellness visit.    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          Follow Up   There are no Patient Instructions on file for this visit.   Return in about 6 months (around 9/4/2025) for  Medicare Wellness.  Patient was given instructions and counseling regarding his condition or for health maintenance advice. Please see specific information pulled into the AVS if appropriate.     Patient or patient representative verbalized consent for the use of Ambient Listening during the visit with  Renuka Costello MD for chart documentation. 3/16/2025  08:32 EST

## 2025-03-05 LAB
ALBUMIN SERPL-MCNC: 4.3 G/DL (ref 3.5–5.2)
ALBUMIN/GLOB SERPL: 1.5 G/DL
ALP SERPL-CCNC: 74 U/L (ref 39–117)
ALT SERPL-CCNC: 28 U/L (ref 1–41)
AST SERPL-CCNC: 27 U/L (ref 1–40)
BASOPHILS # BLD AUTO: 0.1 10*3/MM3 (ref 0–0.2)
BASOPHILS NFR BLD AUTO: 1.7 % (ref 0–1.5)
BILIRUB SERPL-MCNC: 0.8 MG/DL (ref 0–1.2)
BUN SERPL-MCNC: 17 MG/DL (ref 8–23)
BUN/CREAT SERPL: 15 (ref 7–25)
CALCIUM SERPL-MCNC: 10 MG/DL (ref 8.6–10.5)
CHLORIDE SERPL-SCNC: 100 MMOL/L (ref 98–107)
CO2 SERPL-SCNC: 27.9 MMOL/L (ref 22–29)
CREAT SERPL-MCNC: 1.13 MG/DL (ref 0.76–1.27)
EGFRCR SERPLBLD CKD-EPI 2021: 69.5 ML/MIN/1.73
EOSINOPHIL # BLD AUTO: 0.32 10*3/MM3 (ref 0–0.4)
EOSINOPHIL NFR BLD AUTO: 5.5 % (ref 0.3–6.2)
ERYTHROCYTE [DISTWIDTH] IN BLOOD BY AUTOMATED COUNT: 13.6 % (ref 12.3–15.4)
GLOBULIN SER CALC-MCNC: 2.8 GM/DL
GLUCOSE SERPL-MCNC: 114 MG/DL (ref 65–99)
HCT VFR BLD AUTO: 48.5 % (ref 37.5–51)
HGB BLD-MCNC: 15.8 G/DL (ref 13–17.7)
IMM GRANULOCYTES # BLD AUTO: 0.02 10*3/MM3 (ref 0–0.05)
IMM GRANULOCYTES NFR BLD AUTO: 0.3 % (ref 0–0.5)
LYMPHOCYTES # BLD AUTO: 1.68 10*3/MM3 (ref 0.7–3.1)
LYMPHOCYTES NFR BLD AUTO: 29 % (ref 19.6–45.3)
MCH RBC QN AUTO: 28.3 PG (ref 26.6–33)
MCHC RBC AUTO-ENTMCNC: 32.6 G/DL (ref 31.5–35.7)
MCV RBC AUTO: 86.9 FL (ref 79–97)
MONOCYTES # BLD AUTO: 0.63 10*3/MM3 (ref 0.1–0.9)
MONOCYTES NFR BLD AUTO: 10.9 % (ref 5–12)
NEUTROPHILS # BLD AUTO: 3.04 10*3/MM3 (ref 1.7–7)
NEUTROPHILS NFR BLD AUTO: 52.6 % (ref 42.7–76)
NRBC BLD AUTO-RTO: 0 /100 WBC (ref 0–0.2)
PLATELET # BLD AUTO: 302 10*3/MM3 (ref 140–450)
POTASSIUM SERPL-SCNC: 4.9 MMOL/L (ref 3.5–5.2)
PROT SERPL-MCNC: 7.1 G/DL (ref 6–8.5)
RBC # BLD AUTO: 5.58 10*6/MM3 (ref 4.14–5.8)
SODIUM SERPL-SCNC: 137 MMOL/L (ref 136–145)
WBC # BLD AUTO: 5.79 10*3/MM3 (ref 3.4–10.8)

## 2025-04-25 ENCOUNTER — OFFICE VISIT (OUTPATIENT)
Dept: GASTROENTEROLOGY | Facility: CLINIC | Age: 70
End: 2025-04-25
Payer: MEDICARE

## 2025-04-25 ENCOUNTER — TELEPHONE (OUTPATIENT)
Dept: GASTROENTEROLOGY | Facility: CLINIC | Age: 70
End: 2025-04-25
Payer: MEDICARE

## 2025-04-25 VITALS
BODY MASS INDEX: 24.68 KG/M2 | HEIGHT: 72 IN | DIASTOLIC BLOOD PRESSURE: 88 MMHG | WEIGHT: 182.22 LBS | SYSTOLIC BLOOD PRESSURE: 143 MMHG | TEMPERATURE: 97.1 F | HEART RATE: 87 BPM

## 2025-04-25 DIAGNOSIS — K20.0 ESOPHAGITIS, EOSINOPHILIC: Primary | ICD-10-CM

## 2025-04-25 DIAGNOSIS — R13.19 ESOPHAGEAL DYSPHAGIA: ICD-10-CM

## 2025-04-25 DIAGNOSIS — K21.00 GASTROESOPHAGEAL REFLUX DISEASE WITH ESOPHAGITIS WITHOUT HEMORRHAGE: ICD-10-CM

## 2025-04-25 RX ORDER — PANTOPRAZOLE SODIUM 40 MG/1
40 TABLET, DELAYED RELEASE ORAL DAILY
Qty: 90 TABLET | Refills: 3 | Status: SHIPPED | OUTPATIENT
Start: 2025-04-25

## 2025-04-25 NOTE — PROGRESS NOTES
"Chief Complaint  Difficulty Swallowing    Subjective          History Of Present Illness:    Leonardo Youssef is a  70 y.o. male patient of Dr. Rock who presents as a follow-up for EOE, heartburn, dysphagia.    Patient reports since his last office visit he has began developing episodes of pain with swallowing at the area of the xiphoid process.  He has not had to regurgitate any food.  He is able to pass all food with water.  He denies any nausea, vomiting.  He is being mindful of the foods he eats and chewing appropriately.  He denies any black or bloody stools.  Patient denies any abnormal weight loss or poor appetite.    History of DVT on Eliquis.     Additional data reviewed:  EGD 6/19/2023 -esophageal mucosal changes secondary to eosinophilic esophagitis, hiatal hernia, small amount of food residue in the stomach, gastritis, normal duodenum.  Biopsies consistent with ongoing eosinophilic esophagitis.  Biopsies consistent with Candida esophagitis.    Cologuard 2023 negative.    Objective   Vital Signs:   /88 (BP Location: Right arm, Patient Position: Sitting, Cuff Size: Adult)   Pulse 87   Temp 97.1 °F (36.2 °C) (Temporal)   Ht 182.9 cm (72\")   Wt 82.7 kg (182 lb 3.5 oz)   BMI 24.71 kg/m²       Physical Exam  Vitals reviewed.   Constitutional:       General: He is not in acute distress.     Appearance: Normal appearance. He is not ill-appearing.   HENT:      Head: Normocephalic and atraumatic.      Nose: Nose normal.      Mouth/Throat:      Pharynx: Oropharynx is clear.   Eyes:      Conjunctiva/sclera: Conjunctivae normal.   Pulmonary:      Effort: Pulmonary effort is normal.   Abdominal:      General: There is no distension.      Palpations: Abdomen is soft. There is no mass.      Tenderness: There is no abdominal tenderness.   Musculoskeletal:         General: No swelling. Normal range of motion.      Cervical back: Normal range of motion.   Skin:     General: Skin is warm and dry.      Findings: No " bruising or rash.   Neurological:      General: No focal deficit present.      Mental Status: He is alert and oriented to person, place, and time.      Motor: No weakness.      Gait: Gait normal.   Psychiatric:         Mood and Affect: Mood normal.          Result Review :   The following data was reviewed by: Tawnya Morales PA-C on 04/25/2025:  CMP          8/5/2024    10:47 3/4/2025    08:59   CMP   Glucose 96  114    BUN 14  17    Creatinine 1.04  1.13    EGFR 76.8  69.5    Sodium 137  137    Potassium 4.7  4.9    Chloride 99  100    Calcium 10.1  10.0    Total Protein 7.1  7.1    Albumin 4.6  4.3    Globulin 2.5  2.8    Total Bilirubin 0.7  0.8    Alkaline Phosphatase 70  74    AST (SGOT) 30  27    ALT (SGPT) 27  28    Albumin/Globulin Ratio 1.8  1.5    BUN/Creatinine Ratio 13.5  15.0      CBC          8/5/2024    10:47 3/4/2025    08:59   CBC   WBC 5.63  5.79    RBC 5.53  5.58    Hemoglobin 15.8  15.8    Hematocrit 48.2  48.5    MCV 87.2  86.9    MCH 28.6  28.3    MCHC 32.8  32.6    RDW 13.7  13.6    Platelets 249  302            Assessment and Plan    Diagnoses and all orders for this visit:    1. Esophagitis, eosinophilic (Primary)  -     Case Request; Standing  -     Implement Anesthesia orders day of procedure.; Standing  -     Follow Anesthesia Guidelines / Protocol; Future  -     Case Request  -     pantoprazole (PROTONIX) 40 MG EC tablet; Take 1 tablet by mouth Daily.  Dispense: 90 tablet; Refill: 3    2. Gastroesophageal reflux disease with esophagitis without hemorrhage  -     Case Request; Standing  -     Implement Anesthesia orders day of procedure.; Standing  -     Follow Anesthesia Guidelines / Protocol; Future  -     Case Request  -     pantoprazole (PROTONIX) 40 MG EC tablet; Take 1 tablet by mouth Daily.  Dispense: 90 tablet; Refill: 3    3. Esophageal dysphagia  Overview:  Added automatically from request for surgery [id number]    Orders:  -     Case Request; Standing  -     Implement  Anesthesia orders day of procedure.; Standing  -     Follow Anesthesia Guidelines / Protocol; Future  -     Case Request  -     pantoprazole (PROTONIX) 40 MG EC tablet; Take 1 tablet by mouth Daily.  Dispense: 90 tablet; Refill: 3       Recommend proceeding with updated EGD in the setting of dysphagia and pain at the xiphoid process with swallowing.  Patient does have a history of EOE and has had Candida esophagitis in the past.  Patient advised he will need to hold his Eliquis x 48 hours before his procedure.  Restart Pantoprazole 40 mg daily    Follow Up   Return for EGD.    Dragon dictation used throughout this note.            Tawnya Mccarthy PA-C   [facility]  [address] [place] [address]  Office: [phone]

## 2025-05-19 ENCOUNTER — TELEPHONE (OUTPATIENT)
Dept: GASTROENTEROLOGY | Facility: CLINIC | Age: 70
End: 2025-05-19
Payer: MEDICARE

## 2025-05-21 NOTE — TELEPHONE ENCOUNTER
Renuka Costello MD to Me (Selected Message)        5/20/25  4:49 PM  Okay to hold the Plavix but patient

## 2025-05-21 NOTE — TELEPHONE ENCOUNTER
Called pt and advised of Dr Costello's note, pt is on Eliquis, advised to hold for 2 days. Pt verbalized understanding.     Update sent to Dr Rock.

## 2025-05-27 ENCOUNTER — TELEPHONE (OUTPATIENT)
Dept: GASTROENTEROLOGY | Facility: CLINIC | Age: 70
End: 2025-05-27
Payer: MEDICARE

## 2025-05-27 NOTE — TELEPHONE ENCOUNTER
Hi everyone,    Patient Leonardo Youssef (MRN [id number]) is cancelled for today.  He mentioned that his wife was in the car in the parking lot. We went to look for her but couldn't find her. When we returned, he stated that he had come by himself, and his wife wasn't there. He would like to reschedule his appointment.    Thank you!  FROM [facility] ([place])

## 2025-08-21 RX ORDER — APIXABAN 5 MG/1
5 TABLET, FILM COATED ORAL EVERY 12 HOURS SCHEDULED
Qty: 180 TABLET | Refills: 1 | Status: SHIPPED | OUTPATIENT
Start: 2025-08-21

## 2025-08-21 RX ORDER — ATORVASTATIN CALCIUM 40 MG/1
40 TABLET, FILM COATED ORAL DAILY
Qty: 90 TABLET | Refills: 1 | Status: SHIPPED | OUTPATIENT
Start: 2025-08-21

## 2025-08-24 DIAGNOSIS — F41.9 ANXIETY AND DEPRESSION: ICD-10-CM

## 2025-08-24 DIAGNOSIS — F32.A ANXIETY AND DEPRESSION: ICD-10-CM

## 2025-08-24 DIAGNOSIS — F42.2 MIXED OBSESSIONAL THOUGHTS AND ACTS: ICD-10-CM

## 2025-08-25 RX ORDER — CLOMIPRAMINE HYDROCHLORIDE 50 MG/1
150 CAPSULE ORAL NIGHTLY
Qty: 270 CAPSULE | Refills: 1 | Status: SHIPPED | OUTPATIENT
Start: 2025-08-25

## (undated) DEVICE — LN SMPL CO2 SHTRM SD STREAM W/M LUER

## (undated) DEVICE — TUBING, SUCTION, 1/4" X 10', STRAIGHT: Brand: MEDLINE

## (undated) DEVICE — FRCP BX RADJAW4 NDL 2.8 240CM LG OG BX40

## (undated) DEVICE — GW EMR FIX EXCHG J STD .035 3MM 260CM

## (undated) DEVICE — BITEBLOCK OMNI BLOC

## (undated) DEVICE — GLIDESHEATH BASIC HYDROPHILIC COATED INTRODUCER SHEATH: Brand: GLIDESHEATH

## (undated) DEVICE — ADAPT CLN BIOGUARD AIR/H2O DISP

## (undated) DEVICE — KT ORCA ORCAPOD DISP STRL

## (undated) DEVICE — CANN O2 ETCO2 FITS ALL CONN CO2 SMPL A/ 7IN DISP LF

## (undated) DEVICE — KT MANIFLD CARDIAC

## (undated) DEVICE — SENSR O2 OXIMAX FNGR A/ 18IN NONSTR

## (undated) DEVICE — CATH DIAG IMPULSE FL3.5 6F 100CM

## (undated) DEVICE — CATH VENT MIV RADL PIG ST TIP 5F 110CM

## (undated) DEVICE — CATH DIAG IMPULSE FR4 6F 100CM

## (undated) DEVICE — PK CATH CARD 40